# Patient Record
Sex: FEMALE | Race: OTHER | HISPANIC OR LATINO | Employment: FULL TIME | URBAN - METROPOLITAN AREA
[De-identification: names, ages, dates, MRNs, and addresses within clinical notes are randomized per-mention and may not be internally consistent; named-entity substitution may affect disease eponyms.]

---

## 2017-03-17 ENCOUNTER — ALLSCRIPTS OFFICE VISIT (OUTPATIENT)
Dept: OTHER | Facility: OTHER | Age: 27
End: 2017-03-17

## 2017-06-02 ENCOUNTER — ALLSCRIPTS OFFICE VISIT (OUTPATIENT)
Dept: OTHER | Facility: OTHER | Age: 27
End: 2017-06-02

## 2017-08-18 ENCOUNTER — ALLSCRIPTS OFFICE VISIT (OUTPATIENT)
Dept: OTHER | Facility: OTHER | Age: 27
End: 2017-08-18

## 2017-11-03 ENCOUNTER — ALLSCRIPTS OFFICE VISIT (OUTPATIENT)
Dept: OTHER | Facility: OTHER | Age: 27
End: 2017-11-03

## 2018-01-10 NOTE — PROGRESS NOTES
Assessment    1  On Depo-Provera for contraception (V25 49) (Z30 40)   2  Encounter for routine gynecological examination with Papanicolaou smear of cervix   (V72 31,V76 2) (Z01 419)    Plan  Encounter for routine gynecological examination with Papanicolaou smear of cervix    · Follow-up visit in 3 months Evaluation and Treatment  Follow-upGYN annual exam, PAP  due 2016  Status: Hold For - Scheduling  Requested for: [id number]   Ordered; For: Encounter for routine gynecological examination with Papanicolaou smear of cervix; Ordered By: Silas Kay Performed:  Due: [id number]  SocHx: On Depo-Provera for contraception    · MedroxyPROGESTERone Acetate 150 MG/ML Intramuscular Suspension  (Depo-Provera); INJECT INTRAMUSCULARLY AS DIRECTED   Rx By: Silas Kay; Dispense: 1 Days ; #:1 X 1 ML Vial; Refill: 5; For: SocHx: On Depo-Provera for contraception; EARL = N; Sent To: [place] [id number]    Discussion/Summary  health maintenance visit Currently, she eats a healthy diet, has an adequate exercise regimen and walks daily  the risks and benefits of cervical cancer screening were discussed cervical cancer screening is current next cervical cancer screening is due 2019 Breast cancer screening: the risks and benefits of breast cancer screening were discussed, self breast exam technique was taught and monthly self breast exam was advised  Advice and education were given regarding nutrition, aerobic exercise, weight bearing exercise, weight loss, calcium supplements, vitamin D supplements, contraception, sunscreen use and seat belt use  Patient discussion: discussed with the patient  Pt verbalized understanding of all discussed  Chief Complaint  Patient is here for annual exam and Depo        History of Present Illness  HPI: Last annual exam and WNL PAP 1 yr  ago prior to birth of baby, baby is 5 months old  Pt is using Depo for birth control, would like to continue   GYN HM, Adult Female [facility] Radames: The patient is being seen for a health maintenance evaluation  The last health maintenance visit was 1 year(s) ago  Social History: Household members include domestic partner and 1 daughter(s)  She is unmarried  Work status: not currently employed  General Health: The patient's health since the last visit is described as good  She has regular dental visits  She complains of vision problems  She denies hearing loss  Immunizations status: up to date  Lifestyle:  She consumes a diverse and healthy diet  She does not have any weight concerns  She exercises regularly  She exercises less than three times a week  Exercise includes walking  She does not use tobacco  She denies alcohol use  She denies drug use  Reproductive health:  she reports no menstrual problems  she uses contraception  For contraception, she uses depo-medroxyprogesterone  she is sexually active  She is monogamous with a male partner and 1 partner x3 yrs  pregnancy history: G 1P 1, 1   No menses with Depo  Screening: cancer screening reviewed and current  Review of Systems    Over the past 2 weeks, how often have you been bothered by the following problems? 1 ) Little interest or pleasure in doing things? Not at all    2 ) Feeling down, depressed or hopeless? Not at all    3 ) Trouble falling asleep or sleeping too much? Not at all    4 ) Feeling tired or having little energy? Not at all    5 ) Poor appetite or overeating? Not at all    6 ) Feeling bad about yourself, or that you are a failure, or have let yourself or your family down? Not at all    7 ) Trouble concentrating on things, such as reading a newspaper or watching television? Not at all    8 ) Moving or speaking so slowly that other people could have noticed, or the opposite, moving or speaking faster than usual? Not at all    9 ) Thoughts that you would be better off dead or of hurting yourself in some way? Not at all     Score 0      OB History  Pregnancy History (Brief):   Prior pregnancies: : 1  Para: 1 (full-term) and 1 (living)   Delivery type: 1  section   Baby was Breech      Active Problems    1  Birth control (V25 9) (Z30 9)   2  BMI 40 0-44 9, adult (V85 41) (Z68 41)   3  On Depo-Provera for contraception (V25 49) (Z30 40)   4  Postpartum follow-up (V24 2) (Z39 2)   5  Status post  section (V45 89) (Z98 891)    Past Medical History    · History of Echogenic focus of heart of fetus affecting antepartum care of mother (200 80)  (V50 3SN6)   · History of Encounter for pregnancy related examination in third trimester (V22 1) (Z34 83)   · History of pregnancy (V13 29)   · History of Maternal morbid obesity in third trimester, antepartum (649 13,278 01)  (O99 213,E66 01)   · History of Rubella non-immune status, antepartum (646 83,V15 83) (O99 89,Z28 3)    Surgical History    · History of  Section   · History of Foot Surgery    Family History  Mother    · Family history of depression (V17 0) (Z81 8)   · Family history of schizophrenia (V17 0) (Z81 8)  Father    · Family history of Diabetes mellitus due to underlying condition with complications   · Family history of depression (V17 0) (Z81 8)   · Family history of schizophrenia (V17 0) (Z81 8)    Social History    · Never smoker   · On Depo-Provera for contraception (V25 49) (Z30 40)    Current Meds   1  Depo-Provera 150 MG/ML Intramuscular Suspension; Therapy: (Recorded:12Stn4678) to Recorded   2  MedroxyPROGESTERone Acetate 150 MG/ML Intramuscular Suspension; INJECT   INTRAMUSCULARLY AS DIRECTED; Therapy: 69JNN9237 to (Evaluate:2016)  Requested for: 10USN5116;   Last Rx:2016 Ordered    Allergies    1  Aspirin EC Extra Strength TBEC    2  No Known Environmental Allergies   3   No Known Food Allergies    Vitals   Recorded: 57YJS1014 35:38VQ   Systolic 196   Diastolic 83   Height 5 ft 5 in   Weight 249 lb    BMI Calculated 41 44   BSA Calculated 2 17     Signatures Electronically signed by :  PILY Somers; Jun 2 2017 10:32AM EST                       (Author)

## 2018-01-10 NOTE — PROGRESS NOTES
Chief Complaint  Patient is here for Depo injection today  Active Problems    1  Birth control (V25 9) (Z30 9)   2  BMI 40 0-44 9, adult (V85 41) (Z68 41)   3  Encounter for routine gynecological examination with Papanicolaou smear of cervix   (V72 31,V76 2) (Z01 419)   4  On Depo-Provera for contraception (V25 49) (Z30 40)   5  Postpartum follow-up (V24 2) (Z39 2)   6  Status post  section (V45 89) (N32 367)    Current Meds   1  Depo-Provera 150 MG/ML Intramuscular Suspension; Therapy: (Recorded:20Lrq1667) to Recorded   2  MedroxyPROGESTERone Acetate 150 MG/ML Intramuscular Suspension; INJECT   INTRAMUSCULARLY AS DIRECTED; Therapy: 43WEI7239 to (Evaluate:2017)  Requested for: 2017;   Last Rx:2017 Ordered    Allergies    1  Aspirin EC Extra Strength TBEC    2  No Known Environmental Allergies   3  No Known Food Allergies    Plan  Birth control    · MedroxyPROGESTERone Acetate 150 MG/ML Intramuscular Suspension    Future Appointments    Date/Time Provider Specialty Site   2017 08:00 AM Kiley Sam 171, Injection Schedule  [address]     Signatures   Electronically signed by : PILY Reyes;  Aug 21 2017 10:17AM EST                       (Acknowledgement)    Electronically signed by : Ward Serna, ; Aug 22 2017  3:19PM EST                       (Author)    Electronically signed by : Grace Cox MD; Aug 22 2017  4:05PM EST                       (Administrative)

## 2018-01-10 NOTE — PROGRESS NOTES
2016         RE: Reji Spencer                              To: Lynsey Seymour   MR#: [id number]                                   [facility]   : 1990                                  Second Floor [employer]  ENC: [id number]:[facility]                             Harjit, [address]   (Exam #: [id number])                           Fax: [phone]      The LMP of this 30year old,  G1, P0-0-0-0 patient was OCT 27 2015, giving   her an POLO of AUG 2 2016 and a current gestational age of 42 weeks 6 days   by dates  A sonographic examination was performed on 2016 using   real time equipment  The ultrasound examination was performed using   abdominal technique  The patient has a BMI of 42 1  Her blood pressure   today was 135/89  Cardiac motion was observed at 130 bpm       INDICATIONS      morbid obesity      Exam Types      Amniotic Fluid Index      RESULTS      Fetus # 1 of 1   Breech presentation   Placenta Location = Posterior   Placenta Grade = II      AMNIOTIC FLUID      Q1: 6 1      Q2: 3 6      Q3: 3 8      Q4: 4 8   VALENTÍN Total = 18 2 cm   Amniotic Fluid: Normal      BIOPHYSICAL PROFILE      The Biophysical Profile score was 8/8  Breathin  Movement: 2  Tone: 2  AFV: 2      IMPRESSION      Chilel IUP   Breech presentation   Regular fetal heart rate of 130 bpm   Posterior placenta      GENERAL COMMENT      The nonstress test today showed a normal baseline, the presence of   accelerations, moderate variability, and no decelerations and thus the NST   was reassuring  The amniotic fluid was normal today as well and thus both   of these tests showed reassuring results  The fetus also showed fetal   breathing today as well and thus I was reassured by today's results  MIGUELANGEL Castaneda M D     Maternal-Fetal Medicine   Electronically signed 16 16:06

## 2018-01-11 NOTE — PROGRESS NOTES
2016         RE: Dee Mann                               To: Milagro Ragsdale   MR#: [id number]                                   [address] [place] [address]   : 1990                                  Second Floor [employer]  ENC: [id number]                             [place], [address]   (Exam #: [id number])                           Fax: [phone]      The LMP of this 30year old,  G1, P0-0-0-0 patient was OCT 27 2015, giving   her an POLO of AUG 2 2016 and a current gestational age of 29 weeks 1 day   by dates  A sonographic examination was performed on 2016 using   real time equipment  The ultrasound examination was performed using   abdominal technique  The patient has a BMI of 41 3  Her blood pressure   today was 120/79  Cardiac motion was observed at 136 bpm       INDICATIONS      morbid obesity   Evaluate missed anatomy   echogenic focus, left ventricle   abnormal uterine Doppler   elevated  1 hr  GTT      Exam Types      Level I      RESULTS      Fetus # 1 of 1   Breech presentation   Fetal growth appeared normal   Placenta Location = Fundal   No placenta previa   Placenta Grade = II      MEASUREMENTS (* Included In Average GA)      AC              28 5 cm        32 weeks 4 days* (26%)   BPD              8 4 cm        33 weeks 6 days* (42%)   HC              30 0 cm        32 weeks 6 days* (16%)   Femur            6 6 cm        33 weeks 5 days* (49%)      HC/AC           1 05   FL/AC           0 23   FL/BPD          0 79   EFW (Ac/Fl/Hc)  2113 grams - 4 lbs 11 oz                 (30%)      THE AVERAGE GESTATIONAL AGE is 33 weeks 1 day +/- 18 days        AMNIOTIC FLUID      Q1: 5 8      Q2: 3 3      Q3: 5 8      Q4: 3 6   VALENTÍN Total = 18 5 cm   Amniotic Fluid: Normal      ANATOMY DETAILS      Visualized Appearing Sonographically Normal:   HEAD: (Calvarium, BPD Level, Lateral Ventricles, Cerebellum);    STOMACH,   RIGHT KIDNEY, LEFT KIDNEY, BLADDER, PLACENTA      Suboptimally Visualized:   HEART: ([employer], Aortic Arch);    EXTREMS: (Rt Hand, Rt Foot)      Not Visualized:   HEART: (Proximal Left Outflow, Proximal Right Outflow, Ductal Arch)      IMPRESSION      Chilel IUP   33 weeks and 1 day by this ultrasound  (POLO=AUG 9 2016)   Breech presentation   Fetal growth appeared normal   Regular fetal heart rate of 136 bpm   Fundal placenta   No placenta previa      GENERAL COMMENT      On exam today the patient appears well, in no acute distress, and denies   any complaints  Her abdomen is non-tender  There has been appropriate interval fetal growth  Good fetal movement and   tone are seen  The amniotic fluid volume appears normal   The placenta is   fundal and it appears sonographically normal   The anatomic structures   listed above could not be optimally imaged today because of fetal   position; however, these structures were seen on a prior ultrasound and   appeared sonographically normal except for the cardiac arches which were   still not well visualized today  The patient was informed of today's   findings and all of her questions were answered  The limitations of   ultrasound were reviewed with the patient   labor precautions and   fetal kick counts were reviewed  Discussed follow-up in detail I recommend patient return to initiate   weekly antepartum surveillance at around 36 weeks  We discussed the   increased risk factors of morbid obesity and the reasoning for this   additional surveillance  Thank you very much for allowing us to participate in the care of this   very nice patient  Should you have any questions, please do not hesitate   to contact our office        Please note, in addition to the time spent discussing the results of the   ultrasound, I spent approximately 10 minutes of face-to-face time with the   patient, greater than 50% of which was spent in counseling and the   coordination of care for this patient  MIGUELANGEL Zhu M D     Electronically signed 06/22/16 13:18

## 2018-01-11 NOTE — PROGRESS NOTES
EXTREMS:   (Rt Hand)      Not Visualized:   FACE/NECK: (Nose/Lips, Palate); HEART: (Four Chamber View, Proximal   Left Outflow, Short Axis of Greater Vessels, Interventricular Septum,   Interatrial Septum);    EXTREMS: (Lt Foot, Rt Foot)      IMPRESSION      Chilel IUP      GENERAL COMMENT      Today's ultrasound overall reassuring  This was primarily a followup   anatomy ultrasound as the previous ultrasounds severely limited secondary   to the Cushing's related to maternal morbid obesity  His ultrasound is   also very difficult given the patient's significant morbid obesity and   density with poor acoustic windows  We were able to visualize most of the   fetal face, spine, and distal extremities  Suboptimal visualization of   the heart remained today  The arches were suboptimally visualized and   there was a normal 3 vessel view  Situs was normal   The kidneys are   present but anterior posterior interrogation could not be seen  Two feet   are present however right angles of the ankles could not be appreciated  The patient will return in approximately 6 weeks for a growth ultrasound   and to complete the anatomy  Thank you very much for allowing us to participate in the care of this   very nice patient  Should you have any questions, please do not hesitate   to contact our office  MIGUELANGEL Mast M D     Electronically signed 03/30/16 10:06

## 2018-01-12 NOTE — PROGRESS NOTES
MAY 11 2016         RE: Chilo Choi                               To: Lynsey Seymour   MR#: [id number]                                   [address] [place] [address]   : 1990                                  [address] [employer]  ENC: [id number]                             [place], [address]   (Exam #: [id number])                           Fax: [phone]      The LMP of this 30year old,  G1, P*-*-*-* patient was OCT 27 2015, giving   her an POLO of AUG 2 2016 and a current gestational age of 35 weeks 1 day   by dates  A sonographic examination was performed on MAY 11 2016 using   real time equipment  The ultrasound examination was performed using   abdominal technique  The patient has a BMI of 41 3  Her blood pressure   today was 120/80  Cardiac motion was observed at 138 bpm       INDICATIONS      morbid obesity   Evaluate missed anatomy   echogenic focus, left ventricle   abnormal uterine Doppler      Exam Types      Level I      RESULTS      Fetus # 1 of 1   Breech presentation   Fetal growth appeared normal   Placenta Location = Posterior   No placenta previa   Placenta Grade = I      MEASUREMENTS (* Included In Average GA)      AC              24 1 cm        28 weeks 3 days* (52%)   BPD              6 8 cm        27 weeks 4 days* (30%)   HC              25 4 cm        27 weeks 3 days* (24%)   Femur            5 2 cm        28 weeks 0 days* (37%)      Cerebellum       3 3 cm        29 weeks 5 days   CisternaMagna    8 7 mm      HC/AC           1 05   FL/AC           0 22   FL/BPD          0 77   EFW (Ac/Fl/Hc)  1175 grams - 2 lbs 9 oz                 (49%)      THE AVERAGE GESTATIONAL AGE is 27 weeks 6 days +/- 14 days        AMNIOTIC FLUID      Q1: 4 2      Q2: 5 3      Q3: 5 3      Q4: 5 9   VALENTÍN Total = 20 6 cm   Amniotic Fluid: Normal      ANATOMY      Head                                    See Details   Face/Neck See Details   Heart                                   See Details   Abd  Cav  Normal   Stomach                                 Normal   Right Kidney                            Normal   Left Kidney                             Normal   Bladder                                 Normal   Placenta                                Normal      ANATOMY DETAILS      Visualized Appearing Sonographically Normal:   HEAD: (Calvarium, BPD Level, Cerebellum, Cis[employer] Magna);    FACE/NECK:   (Nose/Lips, Palate); HEART: ([employer], Cardiac Axis, Cardiac   Position);    ABD  CAV , STOMACH, RIGHT KIDNEY, LEFT KIDNEY, BLADDER,   PLACENTA      Suboptimally Visualized:   HEAD: (Lateral Ventricles); HEART: (Proximal Left Outflow, Proximal   Right Outflow)      Not Visualized:   HEART: (Aortic Arch, Ductal Arch, Interventricular Septum, Interatrial   Septum)      ANATOMY COMMENTS      Fetal anatomy has been previously documented; no anomalies were   identified  The prior US was limited in the area of the kidneys, palate,   nose lips four chamber view which were seen today and appear normal  The   prior US was limited in the area of the right hand, angles of the   legs/feet, cardiac arches, cardiac septum, short axis and LVOT which are   still limited on todays scan  No fetal structural abnormality is   identified on the Level I survey today  Follow up anatomy of the lateral   ventricles, 4 chamber view, outflow tracts, diaphragm,  kidneys, stomach   and bladder appear normal   Fetal interval growth and amniotic fluid   volume are normal       IMPRESSION      Chilel IUP   27 weeks and 6 days by this ultrasound  (POLO=AUG 4 2016)   Breech presentation   Fetal growth appeared normal   Regular fetal heart rate of 138 bpm   Posterior placenta   No placenta previa      GENERAL COMMENT         I had the pleasure of seeing Misa Roca  in the [facility]    today for followup growth scan   She reports normal daily fetal movements  She denies any vaginal bleeding, leakage of fluid, or significant   contractions or pelvic pressure  There has been no major pregnancy   complications since her last visit here in the  Center  She is   obese with a BMI of 41  There was also an echogenic focus within the left   ventricle of the fetal heart  She also had abnormal uterine artery Doppler   flow studies  Her blood pressure today was 120/80 mmHg  On today's ultrasound, the fetus was in a breech presentation  The   amniotic fluid appeared very normal today  Fetal growth was within normal   range  The interval anatomy seen today showed no obvious anomalies  We discussed the importance of initiating fetal kick counting at least   once daily  We discussed the "10 kicks in 2 hour rule"  I instructed her   to report to you immediately should criteria not be met  Kick counts   should begin at 28 weeks gestation  IMPORTANT  FINDINGS ON TODAY'S ULTRASOUND: Breech with appropriate growth         IN SUMMARY:  Today's ultrasound was reassuring as the fetus is growing   well with normal amniotic fluid and a normal umbilical artery Doppler flow   study  The fetus was in a breech presentation  She should continue to do   her kick counts on a daily basis  A fetal growth evaluation is recommended   in 6 weeks and this was scheduled today  Face-to-face time, in addition to time spent discussing ultrasound results   was 10 minutes, with greater than 50% of the time used for counseling and   coordination of care  A description of the counseling/coordination of care   is described above  Thank you very much for allowing us to participate in the care of your   patient  If you have any questions or concerns about today's visit please   do not hesitate to call me  Thank you very much  [facility]  Maternal Fetal Medicine      MIGUELANGEL Oreilly M D  Maternal-Fetal Medicine   Electronically signed 05/11/16 10:08

## 2018-01-12 NOTE — PROGRESS NOTES
MAR 15 2016         RE: Aniyah Jade                              To: [facility]   MR#: [id number]                                   [address] [place] [address]   : 1990                                  Second Floor [employer]  ENC: [id number]                             alex, [address]   (Exam #: [id number])                           Fax: [phone]      The LMP of this 30year old,  1, para 0 patient was OCT 27 2015,   giving her an POLO of AUG 2 2016 and a current gestational age of 25 weeks   0 days by dates  A sonographic examination was performed on MAR 15 2016   using real time equipment  The ultrasound examination was performed using   abdominal & vaginal techniques  The patient has a BMI of 40 8  Her blood   pressure today was 133/84  Thank you very much for your kind referral of Kwadwo Paris to the   [facility], [facility]  in [facility] on March 15, 2016 for level II ultrasound   evaluation and  assessment  Michael Victoria is a 57-year-old primigravida   who is currently at 20-0/7 weeks gestation  She has no complaints  She   denies vaginal bleeding  She has not yet been screened for gestational   diabetes during this pregnancy  She has not had genetic screening obtained  Michael Victoria is morbidly obese  Her past medical and surgical histories are   otherwise apparently unremarkable  She currently takes no medication with   the exception of a prenatal vitamin on a daily basis  She has a drug   allergy to aspirin        Cardiac motion was observed at 153 bpm       INDICATIONS      fetal anatomical survey   morbid obesity      Exam Types      LEVEL II   Transvaginal      RESULTS      Fetus # 1 of 1   Vertex presentation   Fetal growth appeared normal   Placenta Location = Posterior, right lateral   No placenta previa   Placenta Grade = I      MEASUREMENTS (* Included In Average GA)      AC              15 2 cm        20 weeks 1 day * (54%)   BPD              4 5 cm        19 weeks 4 days* (39%)   HC              17 6 cm        20 weeks 0 days* (49%)   Femur            3 5 cm        21 weeks 1 day * (62%)      Nuchal Fold      3 1 mm      Humerus          3 4 cm        21 weeks 4 days  (86%)   Radius           2 8 cm        21 weeks 4 days   Ulna             2 8 cm        20 weeks 4 days   Tibia            3 2 cm        21 weeks 6 days  (95%)   Fibula           3 1 cm        20 weeks 5 days   Foot             3 4 cm        20 weeks 2 days      Cerebellum       2 0 cm        19 weeks 6 days   Biorbit          3 4 cm        21 weeks 6 days   CisternaMagna    2 8 mm      HC/AC           1 16   FL/AC           0 23   FL/BPD          0 78   EFW (Ac/Fl/Hc)   360 grams - 0 lbs 13 oz      THE AVERAGE GESTATIONAL AGE is 20 weeks 1 day +/- 10 days  AMNIOTIC FLUID      Largest Vertical Pocket = 5 2 cm   Amniotic Fluid: Normal      ANATOMY SUMMARY      The fetal cranium appeared normal in shape  Choroid plexus cysts are not   present  The lateral ventricles were not dilated and the midline   structures were not deviated  The cerebellum and cisterna magna were   visualized and appeared normal  The nuchal fold is not abnormally   thickened, measured at   3 1 mm  The calvarium showed no evidence of   defect, scalloping of the parietal bones or abnormal shape  The cavum   septum pellucidum appears normal  The fetal face was not imaged well  Anatomy of the fetal thorax appeared within normal limits  in limited   views  The fetal diaphragm appears intact  There were no intrathoracic   masses noted or evidence of pleural/pericardial effusion  There was no   suspicion of tricuspid regurgitation  The cardiac size and structures   appeared sonographically normal at the four chamber view, and cardiac   rhythm was regular  There is no suspicion of fetal dysrhythmia    There was   no evidence of cardiomegaly, pericardial effusion or atrial/ventricular disproportion  An echogenic intracardiac focus  may be present in the   left ventricle  The outflow tract views are normal   The septal, arch,   and 3 vessel tracheal views are suboptimally imaged  The short axis view   of the great vessels is suboptimally imaged  The abdominal cavity appears   normal  There is no evidence of fetal bowel obstruction or abnormally   echogenic bowel  Ascites is not present  The fetal stomach appears normal   in size and shape  The kidneys are suboptimally imaged  The fetal   bladder appears normal in size and shape  There is no suspicion of   ureterocele  The abdominal wall appears intact  A normal abdominal cord   insertion is noted  The fetal spine was not imaged well  Active movement   of the extremities was seen and fetal body motion was also observed during   this examination  There was no evidence of long bone abnormality  The   distal extremities are suboptimally imaged The genitalia appeared normal    The placenta appears normal  There is no evidence of advanced placental   maturation, placental abruption, intervillous thrombosis, placental   infarction or multiple venous lakes  There is a 3 vessel cord  The   placental cord insertion site appears normal  The uterine contour appears   normal  There is no suspicion of a uterine myoma  ADNEXA      The left ovary appeared normal and measured 3 1 x 2 7 x 2 0 cm with a   volume of 8 8 cc  The right ovary was not visualized  UTERINE ARTERIES                                  S/D   PI    RI    NOTCH       Left Uterine Artery              1 02         No       Right Uterine Artery             1 04         No      CERVICAL EVALUATION      The cervix appeared normal            Supine               Cervical Length: 3 60 cm        Other Test Results         Resp To T F  Pressure:  No                    Funneling?: No              Dynamic Changes?: No      IMPRESSION      Chilel IUP   20 weeks and 1 day by this ultrasound  (POLO=AUG 1 2016)   Vertex presentation   Fetal growth appeared normal   Regular fetal heart rate of 153 bpm   Posterior, right lateral placenta   No placenta previa      GENERAL COMMENT      An  echogenic intracardiac focus may be present in the left ventricle of   the fetal heart  No fetal structural abnormality or ultrasound marker for   aneuploidy is otherwise identified on the Level II ultrasound study today   In a very limited and quite difficult study secondary to the constraints   related to maternal morbid obesity  In particular, suboptimal imaging of   the face, heart, kidneys,  and distal extremities is afforded  Fetal   growth, amniotic fluid volume, and maternal uterine artery Doppler study   are normal   The placenta is normal in appearance  The cervix is normal in appearance by transvaginal sonography  The   cervical length is normal   Cervical debris is not present  Cervical   funneling is not present  Neither provocative nor dynamic change is   appreciated  There is no evidence of vasa previa using color Doppler   evaluation  Chen Frost will return to the [address] [place] [address] in 2 weeks to assess fetal   anatomic targets not imaged well today  The apparently isolated echogenic   intracardiac focus is most likely a normal anatomic variant  However, an   EIF is an ultrasound marker for Down syndrome  A Quad Screen should be   obtained for further non invasive genetic risk assessment  Further  M   comments with respect to the risk for underlying aneuploidy will be made   following assessment of remaining anatomic targets in 2 weeks  Morbid obesity is associated with an increased risk for adverse pregnancy   outcomes, including gestational diabetes, fetal growth abnormalities   including macrosomia, fetal structural abnormalities, preeclampsia, venous   thromboembolism, stillbirth, and increased likelihood for    section   Serial fetal growth evaluations are recommended during the second   half of the pregnancy, along with consideration of third trimester   antepartum fetal heart rate testing  Screening for gestational diabetes   should be considered soon  The face to face time, in addition to time spent discussing ultrasound   results, was 15 minutes, greater than 50% of which was spent during   counseling and coordination of care  MIGUELANGEL Jamison M D     Maternal-Fetal Medicine   Electronically signed 03/15/16 18:26

## 2018-01-13 NOTE — PROGRESS NOTES
2016         RE: Oracio Britt                               To: [facility]   MR#: [id number]                                   [address] [place] [address]   : 1990                                  Second Floor [employer]  ENC: [id number] Lombardi, Mk [address]   (Exam #: [id number])                           Fax: [phone]      The LMP of this 30year old,  G1, P0-0-0-0 patient was OCT 27 2015, giving   her an POLO of AUG 2 2016 and a current gestational age of 42 weeks 1 day   by dates  A sonographic examination was performed on 2016 using real   time equipment  The ultrasound examination was performed using abdominal   technique  The patient has a BMI of 42 3  Her blood pressure today was   120/81  Cardiac motion was observed at 138 bpm       INDICATIONS      morbid obesity   echogenic focus, left ventricle      Exam Types      Amniotic Fluid Index      RESULTS      Fetus # 1 of 1   Breech presentation   Placenta Location = Posterior, fundal   No placenta previa   Placenta Grade = III      The NST was reactive with no decelerations  AMNIOTIC FLUID      Q1: 3 0      Q2: 4 0      Q3: 3 7      Q4: 3 3   VALENTÍN Total = 14 0 cm   Amniotic Fluid: Normal      IMPRESSION      Chilel IUP   Breech presentation   Regular fetal heart rate of 138 bpm   Posterior, fundal placenta   No placenta previa      GENERAL COMMENT         Please note the fetus is breech today  Follow-up fetal surveillance   includes once weekly NST's and VALENTÍN's, along with daily kick counts  MIGUELANGEL King M D     Maternal-Fetal Medicine   Electronically signed 16 18:32

## 2018-01-14 VITALS
DIASTOLIC BLOOD PRESSURE: 83 MMHG | HEIGHT: 65 IN | WEIGHT: 249 LBS | SYSTOLIC BLOOD PRESSURE: 129 MMHG | BODY MASS INDEX: 41.48 KG/M2

## 2018-01-15 NOTE — PROGRESS NOTES
Chief Complaint  Depo today  Active Problems    1  BMI 40 0-44 9, adult (V85 41) (Z68 41)   2  On Depo-Provera for contraception (V25 49) (Z30 40)   3  Postpartum follow-up (V24 2) (Z39 2)   4  Status post  section (V45 89) (D11 112)    Current Meds   1  Depo-Provera 150 MG/ML Intramuscular Suspension; Therapy: (Recorded:11Pbp1178) to Recorded   2  MedroxyPROGESTERone Acetate 150 MG/ML Intramuscular Suspension; INJECT   INTRAMUSCULARLY AS DIRECTED; Therapy: 27QEH0774 to (Evaluate:2016)  Requested for: 84YNZ9309;   Last Rx:2016 Ordered   3  Prenatal Vitamins 0 8 MG Oral Tablet; TAKE 1 TABLET DAILY; Therapy: 97GOV5915 to (Evaluate:2017)  Requested for: 42KWO8709; Last   Rx:2016 Ordered    Allergies    1  Aspirin EC Extra Strength TBEC    2  No Known Environmental Allergies   3  No Known Food Allergies    Results/Data  Urine HCG- POC 2016 09:30AM Any Donald     Test Name Result Flag Reference   Urine HCG Negative         Assessment    1  Birth control (V25 9) (Z30 9)    Plan  Birth control    · MedroxyPROGESTERone Acetate 150 MG/ML Intramuscular Suspension  (Depo-Provera)    Future Appointments    Date/Time Provider Specialty Site   2016 09:15 AM Kiley Sam 171, Injection Schedule  [facility] [address]  [facility]     Signatures   Electronically signed by :  PILY Angeles; Oct 18 2016 10:44AM EST                       (Acknowledgement)    Electronically signed by : Jaquan Vilchis MD; Oct 20 2016  8:34AM EST

## 2018-01-16 NOTE — PROGRESS NOTES
Chief Complaint  Patient is here for Depo  Active Problems    1  Birth control (V25 9) (Z30 9)   2  BMI 40 0-44 9, adult (V85 41) (Z68 41)   3  On Depo-Provera for contraception (V25 49) (Z30 40)   4  Postpartum follow-up (V24 2) (Z39 2)   5  Status post  section (V45 89) (S97 073)    Current Meds   1  Depo-Provera 150 MG/ML Intramuscular Suspension; Therapy: (Recorded:08Vdo1590) to Recorded   2  MedroxyPROGESTERone Acetate 150 MG/ML Intramuscular Suspension; INJECT   INTRAMUSCULARLY AS DIRECTED; Therapy: 04YHY8032 to (Evaluate:2016)  Requested for: 49ZRU0286;   Last Rx:2016 Ordered    Allergies    1  Aspirin EC Extra Strength TBEC    2  No Known Environmental Allergies   3   No Known Food Allergies    Vitals  Signs    Systolic: 110  Diastolic: 84  Height: 5 ft 5 in  Weight: 256 lb   BMI Calculated: 42 6  BSA Calculated: 2 2    Plan  Birth control    · MedroxyPROGESTERone Acetate 150 MG/ML Intramuscular Suspension    Future Appointments    Date/Time Provider Specialty Site   2017 09:30 AM [facility] PILY Maldonado Schedule  [facility]     Signatures   Electronically signed by : Isabelle Espinoza DO; Mar 17 8300  9:50AM EST                       (Author)

## 2018-01-17 NOTE — PROGRESS NOTES
2016         RE: Eileen Dubois                               To: [facility]   MR#: [id number]                                   [address] [place] [address]   : 1990                                  Second Floor [employer]  ENC: [id number]                             Mk Lombardi [address]   (Exam #: [id number])                           Fax: [phone]      The LMP of this 30year old,  G1, P0-0-0-0 patient was OCT 27 2015, giving   her an POLO of AUG 2 2016 and a current gestational age of 42 weeks 0 days   by dates  A sonographic examination was performed on 2016 using   real time equipment  The ultrasound examination was performed using   abdominal technique  The patient has a BMI of 42 1  Her blood pressure   today was 132/86  Cardiac motion was observed at 136 bpm       INDICATIONS      morbid obesity      Exam Types      Amniotic Fluid Index   NST      RESULTS      Fetus # 1 of 1   Breech presentation   Placenta Location = Right lateral, fundal   No placenta previa   Placenta Grade = II      The NST was reactive with no decelerations  AMNIOTIC FLUID      Q1: 4 7      Q2: 0 8      Q3: 4 0      Q4: 4 6   VALENTÍN Total = 14 1 cm   Amniotic Fluid: Normal      IMPRESSION      Chilel IUP   Breech presentation   Regular fetal heart rate of 136 bpm   Right lateral, fundal placenta   No placenta previa      RECOMMENDATION      VALENTÍN: 1 Week   NST: 1 Week      MIGUELANGEL Gibson M D     Maternal-Fetal Medicine   Electronically signed 16 10:39

## 2018-01-18 NOTE — PROGRESS NOTES
Chief Complaint  Patient is here for Depo injection today  Active Problems    1  Birth control (V25 9) (Z30 9)   2  BMI 40 0-44 9, adult (V85 41) (Z68 41)   3  Encounter for routine gynecological examination with Papanicolaou smear of cervix   (V72 31,V76 2) (Z01 419)   4  On Depo-Provera for contraception (V25 49) (Z30 40)   5  Postpartum follow-up (V24 2) (Z39 2)   6  Status post  section (V45 89) (P92 492)    Current Meds   1  Depo-Provera 150 MG/ML Intramuscular Suspension; Therapy: (Recorded:80Smt5116) to Recorded   2  MedroxyPROGESTERone Acetate 150 MG/ML Intramuscular Suspension; INJECT   INTRAMUSCULARLY AS DIRECTED; Therapy: 98BQO4479 to (Evaluate:2017)  Requested for: 2017;   Last Rx:2017 Ordered    Allergies    1  Aspirin EC Extra Strength TBEC    2  No Known Environmental Allergies   3  No Known Food Allergies    Vitals  Signs    Systolic: 292  Diastolic: 79  Weight: 298 lb   BMI Calculated: 39 94  BSA Calculated: 2 14    Plan  Birth control    · MedroxyPROGESTERone Acetate 150 MG/ML Intramuscular Suspension    Future Appointments    Date/Time Provider Specialty Site   2018 08:15 AM [facility], Injection Schedule  [facility]     Signatures   Electronically signed by : Aviva Anderson, ; Nov  3 2017  8:27AM EST                       (Author)    Electronically signed by :  PILY Orourke; Nov  3 2017  8:41AM EST                       (Acknowledgement)    Electronically signed by : Sammy Panda MD; 2017  1:30PM EST                       (Administrative)

## 2018-01-22 VITALS — DIASTOLIC BLOOD PRESSURE: 79 MMHG | BODY MASS INDEX: 39.94 KG/M2 | SYSTOLIC BLOOD PRESSURE: 127 MMHG | WEIGHT: 240 LBS

## 2018-01-22 VITALS
DIASTOLIC BLOOD PRESSURE: 84 MMHG | WEIGHT: 256 LBS | HEIGHT: 65 IN | SYSTOLIC BLOOD PRESSURE: 132 MMHG | BODY MASS INDEX: 42.65 KG/M2

## 2018-01-26 ENCOUNTER — OFFICE VISIT (OUTPATIENT)
Dept: OBGYN CLINIC | Facility: CLINIC | Age: 28
End: 2018-01-26
Payer: COMMERCIAL

## 2018-01-26 VITALS
WEIGHT: 242 LBS | SYSTOLIC BLOOD PRESSURE: 143 MMHG | DIASTOLIC BLOOD PRESSURE: 83 MMHG | HEIGHT: 63 IN | BODY MASS INDEX: 42.88 KG/M2

## 2018-01-26 DIAGNOSIS — Z30.09 ENCOUNTER FOR COUNSELING REGARDING CONTRACEPTION: Primary | ICD-10-CM

## 2018-01-26 PROCEDURE — 99213 OFFICE O/P EST LOW 20 MIN: CPT | Performed by: NURSE PRACTITIONER

## 2018-01-26 NOTE — PROGRESS NOTES
Assessment/Plan:      Diagnoses and all orders for this visit:    Encounter for counseling regarding contraception          Subjective:     Patient ID: Huan Peacock is a 32 y o  female  Pt presents to switch from Depo to Mirena  Pt has a 1 yr  Old child and does not want more children x 5 yrs  Discussed long term method and bleeding pattern  Mirena brochure was provided  Pt is covered with Depo until 2 2 2018  Last annual exam and PAP was 2016  Pt plans to schedule Mirena insertion and then annual GYN exam        Review of Systems   Constitutional: Negative            Objective:     Physical Exam

## 2018-01-26 NOTE — PATIENT INSTRUCTIONS
PLease read [facility] brochure  Call with further questions  Return before2/2/2018 for Mirena insertion  Schedule annual exam last PAP was 1/2016

## 2018-02-01 ENCOUNTER — OFFICE VISIT (OUTPATIENT)
Dept: OBGYN CLINIC | Facility: CLINIC | Age: 28
End: 2018-02-01
Payer: COMMERCIAL

## 2018-02-01 VITALS
HEIGHT: 63 IN | HEART RATE: 102 BPM | SYSTOLIC BLOOD PRESSURE: 137 MMHG | BODY MASS INDEX: 42.7 KG/M2 | WEIGHT: 241 LBS | DIASTOLIC BLOOD PRESSURE: 84 MMHG

## 2018-02-01 DIAGNOSIS — Z30.018 ENCOUNTER FOR INITIAL PRESCRIPTION OF OTHER CONTRACEPTIVES: Primary | ICD-10-CM

## 2018-02-01 LAB — SL AMB POCT URINE HCG: NORMAL

## 2018-02-01 PROCEDURE — 99213 OFFICE O/P EST LOW 20 MIN: CPT | Performed by: OBSTETRICS & GYNECOLOGY

## 2018-02-01 PROCEDURE — 58300 INSERT INTRAUTERINE DEVICE: CPT | Performed by: OBSTETRICS & GYNECOLOGY

## 2018-02-01 PROCEDURE — 81025 URINE PREGNANCY TEST: CPT | Performed by: OBSTETRICS & GYNECOLOGY

## 2018-02-01 NOTE — PROGRESS NOTES
Iud insertions  Date/Time: 2018 4:37 PM  Performed by: Lisa Ace  Authorized by: Lisa Ace     Consent:     Consent obtained:  Verbal and written    Consent given by:  Patient    Procedure risks and benefits discussed: yes      Patient questions answered: yes      Patient agrees, verbalizes understanding, and wants to proceed: yes      Educational handouts given: yes      Instructions and paperwork completed: yes    Universal protocol:     Patient states understanding of procedure being performed: yes    Procedure:     Pelvic exam performed: yes      Negative GC/chlamydia test: yes      Negative urine pregnancy test: yes      Cervix cleaned and prepped: yes      Speculum placed in vagina: yes      Tenaculum applied to cervix: yes      IUD inserted with no complications: yes      IUD type:  Mirena    Strings trimmed: yes (3cm)      Uterus sounded: Sounded prior to insertion  Uterus sound depth (cm):  9  Post-procedure:     Patient tolerated procedure well: yes      Patient will follow up after next period: yes (1 month)    Comments:      Pt is a 30yo  presenting for IUD insertion  Pt was on Depo provera previously  Pt was counseled on the risks, benefits and alternatives previously and again today  IUD insertion agreement reviewed and signed today as well as consent for insertion   Mirena IUD lot number: Marilyn West was present for the entire procedure    Ralf Hills MD  OBGYN, PGY-1  2018 4:41 PM

## 2018-03-01 ENCOUNTER — OFFICE VISIT (OUTPATIENT)
Dept: OBGYN CLINIC | Facility: CLINIC | Age: 28
End: 2018-03-01
Payer: COMMERCIAL

## 2018-03-01 VITALS — SYSTOLIC BLOOD PRESSURE: 129 MMHG | DIASTOLIC BLOOD PRESSURE: 83 MMHG | WEIGHT: 240 LBS | BODY MASS INDEX: 42.51 KG/M2

## 2018-03-01 DIAGNOSIS — Z30.431 IUD CHECK UP: Primary | ICD-10-CM

## 2018-03-01 PROCEDURE — 99213 OFFICE O/P EST LOW 20 MIN: CPT | Performed by: OBSTETRICS & GYNECOLOGY

## 2018-03-01 NOTE — PROGRESS NOTES
Assessment/Plan:    #1 IUD Checkup  Mirena IUD inserted 18   Strings visualized on SSE today, no complaints  Follow up for annual GYN examination    Discussed with Dr Isaiah Vigil    Subjective:      Patient ID: Lilliam Parker is a 32 y o  female  HPI    31 y/o  previously on Depo Provera with Mirena IUD inserted on 18 presents for IUD string check  She is asymptomatic without complaints  Denies pain or abnormal bleeding  Review of Systems   Constitutional: Negative for chills and fever  Genitourinary: Negative for pelvic pain, vaginal bleeding, vaginal discharge and vaginal pain  Objective:      /83   Wt 109 kg (240 lb)   BMI 42 51 kg/m²          Physical Exam   Constitutional: She is oriented to person, place, and time  She appears well-developed and well-nourished  Pulmonary/Chest: Effort normal    Genitourinary: Vagina normal and uterus normal    Genitourinary Comments: SSE: Cervix visualized and normal appearing  IUD strings visualized  Neurological: She is alert and oriented to person, place, and time  Skin: Skin is warm and dry  Psychiatric: She has a normal mood and affect

## 2018-03-01 NOTE — PATIENT INSTRUCTIONS
Levonorgestrel (Into the uterus)   Levonorgestrel (anr-uhj-cbw-APRIL-trel)  Prevents pregnancy and treats heavy menstrual bleeding  This is an intrauterine device (IUD), which is a reversible form of birth control  This IUD slowly releases levonorgestrel, a hormone  Brand Name(s): Alveria Guadalupe, Mirena, [place]   There may be other brand names for this medicine  When This Medicine Should Not Be Used: This device is not right for everyone  Do not use it if you had an allergic reaction to levonorgestrel, or you are pregnant  How to Use This Medicine:   Device  · The IUD is usually inserted by your doctor during your monthly period  You will need to see your doctor 4 to 6 weeks after the IUD is placed and then once a year  · Your IUD has a string or "tail" that is made of plastic thread  About one or two inches of this string hangs into your vagina  You cannot see this string, and it will not cause problems when you have sex  Check your IUD after each monthly period  You may not be protected against pregnancy if you cannot feel the string or if you feel plastic  Do the following to check the placement of your IUD:  [facility] your hands with soap and warm water  Dry them with a clean towel  ¨ Bend your knees and squat low to the ground  ¨ Gently put your index finger high inside your vagina  The cervix is at the top of the vagina  Find the IUD string coming from your cervix  Never pull on the string  You should not be able to feel the plastic of the IUD itself  Wash your hands after you are done checking your IUD string  · Your doctor will need to replace your IUD after 3 years for [facility] or [place], or after 5 years for [facility] or [place] [address]  You will also need to have it replaced if it comes out of your uterus  Drugs and Foods to Avoid:   Ask your doctor or pharmacist before using any other medicine, including over-the-counter medicines, vitamins, and herbal products    · Some medicines can affect how this device works  Tell your doctor if you are using a blood thinner (including warfarin)  Warnings While Using This Medicine:   · Tell your doctor if you are breastfeeding, or you have had a baby, miscarriage, or  in the past 3 months  Tell your doctor if you have liver disease (including tumor or cancer), breast cancer, heart or blood circulation problems, including a history of heart valve problems, heart disease, blood clotting problems, stroke, heart attack, or high blood pressure  Tell your doctor if you have problems with your immune system or have had surgery on your female organs (especially fallopian tubes)  · Tell your doctor if you have had any problems, infections, or other conditions that affected your reproductive system  There are many problems that could make an IUD a bad choice for you, including if you have fibroids, unexplained bleeding, a uterus that has an unusual shape, a recent infection, pelvic inflammatory disease, abnormal Pap test, ectopic pregnancy, cancer or suspected cancer, or an existing IUD  · There is a small chance that you could get pregnant when using an IUD, just as there is with any birth control  If you get pregnant, your doctor may remove your IUD to lower the risk of miscarriage or other problems  · This medicine may cause the following problems:  ¨ Increased risk of ectopic pregnancy (pregnancy outside the uterus)  ¨ Increased risk of a serious infection called pelvic inflammatory disease (PID)  ¨ Increased risk for ovarian cysts  ¨ Perforation (hole in the wall of your uterus), which can damage other organs  · You might have some spotting and cramping during the first weeks after the IUD has been inserted  These symptoms should decrease or go away within a few weeks up to 6 months  · You could have less bleeding or even stop having periods by the end of the first year   Call your doctor if you have a change from the regular bleeding pattern after you have had your IUD for awhile, such as more bleeding or if you miss a period (and you were having periods even with your IUD)  · An IUD can slip partly or all of the way out of your uterus  If this happens, use condoms or another form of birth control, and call your doctor right away  · This IUD will not protect you from HIV/AIDS, herpes, or other sexually transmitted diseases  · If you have the Ashlyn Collins or Susan Chavarria, tell your healthcare provider before you have an MRI test   Possible Side Effects While Using This Medicine:   Call your doctor right away if you notice any of these side effects:  · Allergic reaction: Itching or hives, swelling in your face or hands, swelling or tingling in your mouth or throat, chest tightness, trouble breathing  · Chest pain, problems with speech or walking, numbness or weakness in your arm or leg or on one side of your body  · Heavy bleeding from your vagina  · Pain during sex, or if your partner feels the hard plastic of the IUD during sex  · Severe headache, vision changes  · Stomach or pelvic pain, tenderness, or cramping that is sudden or severe  · Vaginal discharge has a bad smell, fever, chills, sores on your genitals  · Yellow skin or eyes  If you notice these less serious side effects, talk with your doctor:   · Acne or other skin changes  · Breast pain  · Change in bleeding pattern after the first few months  · Dizziness or lightheadedness after IUD is placed  · Mild itching around your vagina and genitals  If you notice other side effects that you think are caused by this medicine, tell your doctor  Call your doctor for medical advice about side effects  You may report side effects to FDA at 4-084-FDA-9282  © 2017 [address] [employer] Information is for End User's use only and may not be sold, redistributed or otherwise used for commercial purposes  The above information is an  only  It is not intended as medical advice for individual conditions or treatments  Talk to your doctor, nurse or pharmacist before following any medical regimen to see if it is safe and effective for you

## 2018-05-10 LAB
ABSOL LYMPHOCYTES (HISTORICAL): 1.8 K/UL (ref 0.5–4)
ALBUMIN SERPL BCP-MCNC: 4.1 G/DL (ref 3–5.2)
ALP SERPL-CCNC: 66 U/L (ref 43–122)
ALT SERPL W P-5'-P-CCNC: 28 U/L (ref 9–52)
ANION GAP SERPL CALCULATED.3IONS-SCNC: 8 MMOL/L (ref 5–14)
AST SERPL W P-5'-P-CCNC: 18 U/L (ref 14–36)
BASOPHILS # BLD AUTO: 0 % (ref 0–1)
BASOPHILS # BLD AUTO: 0 K/UL (ref 0–0.1)
BILIRUB SERPL-MCNC: 0.5 MG/DL
BUN SERPL-MCNC: 9 MG/DL (ref 5–25)
CALCIUM SERPL-MCNC: 9.9 MG/DL (ref 8.4–10.2)
CHLORIDE SERPL-SCNC: 102 MEQ/L (ref 97–108)
CO2 SERPL-SCNC: 28 MMOL/L (ref 22–30)
CREATINE, SERUM (HISTORICAL): 0.56 MG/DL (ref 0.6–1.2)
DEPRECATED RDW RBC AUTO: 13.3 %
EGFR (HISTORICAL): >60 ML/MIN/1.73 M2
EOSINOPHIL # BLD AUTO: 0.4 K/UL (ref 0–0.4)
EOSINOPHIL NFR BLD AUTO: 5 % (ref 0–6)
GLUCOSE SERPL-MCNC: 99 MG/DL (ref 70–99)
HCT VFR BLD AUTO: 37.5 % (ref 36–46)
HGB BLD-MCNC: 12.9 G/DL (ref 12–16)
LIPASE SERPL-CCNC: 56 U/L (ref 23–300)
LYMPHOCYTES NFR BLD AUTO: 20 % (ref 25–45)
MCH RBC QN AUTO: 31.2 PG (ref 26–34)
MCHC RBC AUTO-ENTMCNC: 34.3 % (ref 31–36)
MCV RBC AUTO: 91 FL (ref 80–100)
MONOCYTES # BLD AUTO: 0.5 K/UL (ref 0.2–0.9)
MONOCYTES NFR BLD AUTO: 6 % (ref 1–10)
NEUTROPHILS ABS COUNT (HISTORICAL): 6.1 K/UL (ref 1.8–7.8)
NEUTS SEG NFR BLD AUTO: 69 % (ref 45–65)
PLATELET # BLD AUTO: 339 K/MCL (ref 150–450)
POTASSIUM SERPL-SCNC: 4.7 MEQ/L (ref 3.6–5)
RBC # BLD AUTO: 4.12 M/MCL (ref 4–5.2)
SODIUM SERPL-SCNC: 138 MEQ/L (ref 137–147)
TOTAL PROTEIN (HISTORICAL): 7.3 G/DL (ref 5.9–8.4)
WBC # BLD AUTO: 8.8 K/MCL (ref 4.5–11)

## 2019-02-07 ENCOUNTER — OFFICE VISIT (OUTPATIENT)
Dept: OBGYN CLINIC | Facility: CLINIC | Age: 29
End: 2019-02-07

## 2019-02-07 VITALS
BODY MASS INDEX: 37.77 KG/M2 | HEART RATE: 92 BPM | HEIGHT: 66 IN | DIASTOLIC BLOOD PRESSURE: 84 MMHG | WEIGHT: 235 LBS | SYSTOLIC BLOOD PRESSURE: 134 MMHG

## 2019-02-07 DIAGNOSIS — R10.2 PELVIC PAIN: Primary | ICD-10-CM

## 2019-02-07 DIAGNOSIS — Z30.431 IUD CHECK UP: ICD-10-CM

## 2019-02-07 PROCEDURE — 99214 OFFICE O/P EST MOD 30 MIN: CPT | Performed by: OBSTETRICS & GYNECOLOGY

## 2019-02-07 RX ORDER — IBUPROFEN 600 MG/1
600 TABLET ORAL EVERY 6 HOURS PRN
Status: SHIPPED | OUTPATIENT
Start: 2019-02-07

## 2019-02-07 NOTE — PROGRESS NOTES
[facility] 25 [facility]  SUBJECTIVE    CC:  "IUD check and pelvic pain"    HPI: Alma Delia Crenshaw is a 25 y o  P3 female presenting today for acute office visit  C/o lower abdominal and pelvic pain last 2 weeks  She has had IUD MIRENA 2 years ago after labor  She has been c/o of low abdominal pain  She decline urinary burning increase frequency  Decline constiation and diarrhea  For long time amenorrhea possibly due to [facility]  The following portions of the patient's history were reviewed and updated as appropriate: allergies, current medications, past family history, past medical history, past social history, past surgical history and problem list         ROS  General: negative for chills or fever   Respiratory: no cough, shortness of breath, or wheezing  Cardiovascular: no chest pain or dyspnea on exertion  Gastrointestinal: no abdominal pain, change in bowel habits, or black or bloody stools  Urinary: no urinary symptoms  Genitourinary: Pelvic Pain  Neurological: no TIA or stroke symptoms      OBJECTIVE  Vitals:    02/07/19 0856   BP: 134/84   Pulse: 92   Weight: 107 kg (235 lb)   Height: 5' 6" (1 676 m)       General appearance: alert and oriented, in no acute distress  Genitourinary exam: Vulva: normal appearing vulva with no masses, tenderness or lesions  Vagina: normal appearing vagina with normal color and discharge, no lesions  Cervix/Uterus: IUD string observed, Minimal tenderness with touch, normal size uterus  Adnexa: B/L tender to touch, no mass appreciated with palpation    Lab Results:   No visits with results within 1 Day(s) from this visit     Latest known visit with results is:   Orders Only on 05/10/2018   Component Date Value    WBC 05/10/2018 8 8     RBC 05/10/2018 4 12     Hemoglobin 05/10/2018 12 9     Hematocrit 05/10/2018 37 5     MCV 05/10/2018 91     MCH 05/10/2018 31 2     MCHC 05/10/2018 34 3     RDW 05/10/2018 13 3     Platelets 52/06/3743 339     Neutrophils Relative 05/10/2018 69*    NEUTROPHILS ABS COUNT 05/10/2018 6 1     ABSOL LYMPHOCYTES 05/10/2018 1 8     Monocytes Absolute 05/10/2018 0 5     Eosinophils Absolute 05/10/2018 0 4     Basophils Absolute 05/10/2018 0 0     Lymphocytes Relative 05/10/2018 20*    Monocytes Relative 05/10/2018 6     Eosinophils Relative 05/10/2018 5     Basophils Relative 05/10/2018 0     Glucose 05/10/2018 99     BUN 05/10/2018 9     CREATINE, SERUM 05/10/2018 0 56*    Sodium 05/10/2018 138     Potassium 05/10/2018 4 7     Chloride 05/10/2018 102     CO2 05/10/2018 28     Total Bilirubin 05/10/2018 0 5     Calcium 05/10/2018 9 9     Total Protein 05/10/2018 7 3     Albumin 05/10/2018 4 1     Alkaline Phosphatase 05/10/2018 66     AST 05/10/2018 18     ALT 05/10/2018 28     Anion Gap 05/10/2018 8     EGFR (HISTORICAL) 05/10/2018 >60     Lipase 05/10/2018 56               ASSESSMENT    Lower abdominal and pelvic pain, IUD is in    PLAN  1  Speculum exam, IUD strings observed  2  Pelvic TVUS  3  Recheck with US results  4  Annual physical exam dn Pap smear with HPV reflex recommended         All questions were answered &  expressed understanding      Patient was seen and discussed with Dr Freddie Mckay MD  OBGYN PGY-1  [unfilled]

## 2019-02-08 ENCOUNTER — HOSPITAL ENCOUNTER (OUTPATIENT)
Dept: ULTRASOUND IMAGING | Facility: HOSPITAL | Age: 29
Discharge: HOME | End: 2019-02-08
Payer: COMMERCIAL

## 2019-02-08 DIAGNOSIS — R10.2 PELVIC PAIN: ICD-10-CM

## 2019-02-08 DIAGNOSIS — Z30.431 IUD CHECK UP: ICD-10-CM

## 2019-02-08 PROCEDURE — 76830 TRANSVAGINAL US NON-OB: CPT

## 2019-02-08 PROCEDURE — 76856 US EXAM PELVIC COMPLETE: CPT

## 2019-02-08 NOTE — PROGRESS NOTES
- Patient had TVUS and It has reported as IUD is in the mid-lower uterine body/entering the cervical segment and should be repositioned    - Patient notified about the IUD need to be removed   - She may replace with new IUD also  - Will get appointment to clinic for removal    Caitlyn Albarran MD  OBGYN, PGY-1  2/8/2019  5:51 PM

## 2019-04-16 ENCOUNTER — HOSPITAL ENCOUNTER (EMERGENCY)
Facility: HOSPITAL | Age: 29
Discharge: HOME | End: 2019-04-16
Attending: EMERGENCY MEDICINE
Payer: COMMERCIAL

## 2019-04-16 VITALS
TEMPERATURE: 95.5 F | WEIGHT: 237 LBS | DIASTOLIC BLOOD PRESSURE: 65 MMHG | HEART RATE: 77 BPM | RESPIRATION RATE: 18 BRPM | SYSTOLIC BLOOD PRESSURE: 110 MMHG | OXYGEN SATURATION: 99 % | BODY MASS INDEX: 38.25 KG/M2

## 2019-04-16 DIAGNOSIS — R07.89 CHEST WALL PAIN: Primary | ICD-10-CM

## 2019-04-16 LAB
ALBUMIN SERPL BCP-MCNC: 4 G/DL (ref 3–5.2)
ALP SERPL-CCNC: 70 U/L (ref 43–122)
ALT SERPL W P-5'-P-CCNC: 15 U/L (ref 9–52)
ANION GAP SERPL CALCULATED.3IONS-SCNC: 10 MMOL/L (ref 5–14)
AST SERPL W P-5'-P-CCNC: 18 U/L (ref 14–36)
ATRIAL RATE: 75 BPM
BASOPHILS # BLD AUTO: 0 THOUSANDS/ΜL (ref 0–0.1)
BASOPHILS NFR BLD AUTO: 0 % (ref 0–1)
BILIRUB SERPL-MCNC: 0.4 MG/DL
BUN SERPL-MCNC: 17 MG/DL (ref 5–25)
CALCIUM SERPL-MCNC: 8.9 MG/DL (ref 8.4–10.2)
CHLORIDE SERPL-SCNC: 105 MMOL/L (ref 97–108)
CO2 SERPL-SCNC: 23 MMOL/L (ref 22–30)
CREAT SERPL-MCNC: 0.63 MG/DL (ref 0.6–1.2)
EOSINOPHIL # BLD AUTO: 0.1 THOUSAND/ΜL (ref 0–0.4)
EOSINOPHIL NFR BLD AUTO: 2 % (ref 0–6)
ERYTHROCYTE [DISTWIDTH] IN BLOOD BY AUTOMATED COUNT: 12.8 %
EXT PREG TEST URINE: NEGATIVE
GFR SERPL CREATININE-BSD FRML MDRD: 121 ML/MIN/1.73SQ M
GLUCOSE SERPL-MCNC: 100 MG/DL (ref 70–99)
HCT VFR BLD AUTO: 36.1 % (ref 36–46)
HGB BLD-MCNC: 12.3 G/DL (ref 12–16)
LIPASE SERPL-CCNC: 96 U/L (ref 23–300)
LYMPHOCYTES # BLD AUTO: 1.6 THOUSANDS/ΜL (ref 0.5–4)
LYMPHOCYTES NFR BLD AUTO: 19 % (ref 25–45)
MCH RBC QN AUTO: 32.9 PG (ref 26–34)
MCHC RBC AUTO-ENTMCNC: 33.9 G/DL (ref 31–36)
MCV RBC AUTO: 97 FL (ref 80–100)
MONOCYTES # BLD AUTO: 0.4 THOUSAND/ΜL (ref 0.2–0.9)
MONOCYTES NFR BLD AUTO: 5 % (ref 1–10)
NEUTROPHILS # BLD AUTO: 6.2 THOUSANDS/ΜL (ref 1.8–7.8)
NEUTS SEG NFR BLD AUTO: 74 % (ref 45–65)
P AXIS: 10 DEGREES
PLATELET # BLD AUTO: 346 THOUSANDS/UL (ref 150–450)
PMV BLD AUTO: 7.9 FL (ref 8.9–12.7)
POTASSIUM SERPL-SCNC: 4 MMOL/L (ref 3.6–5)
PR INTERVAL: 148 MS
PROT SERPL-MCNC: 6.9 G/DL (ref 5.9–8.4)
QRS AXIS: 16 DEGREES
QRSD INTERVAL: 88 MS
QT INTERVAL: 362 MS
QTC INTERVAL: 404 MS
RBC # BLD AUTO: 3.73 MILLION/UL (ref 4–5.2)
SODIUM SERPL-SCNC: 138 MMOL/L (ref 137–147)
T WAVE AXIS: 39 DEGREES
TROPONIN I SERPL-MCNC: <0.01 NG/ML (ref 0–0.03)
VENTRICULAR RATE: 75 BPM
WBC # BLD AUTO: 8.3 THOUSAND/UL (ref 4.5–11)

## 2019-04-16 PROCEDURE — 93005 ELECTROCARDIOGRAM TRACING: CPT

## 2019-04-16 PROCEDURE — 83690 ASSAY OF LIPASE: CPT | Performed by: EMERGENCY MEDICINE

## 2019-04-16 PROCEDURE — 36415 COLL VENOUS BLD VENIPUNCTURE: CPT | Performed by: EMERGENCY MEDICINE

## 2019-04-16 PROCEDURE — 93010 ELECTROCARDIOGRAM REPORT: CPT | Performed by: INTERNAL MEDICINE

## 2019-04-16 PROCEDURE — 81025 URINE PREGNANCY TEST: CPT | Performed by: EMERGENCY MEDICINE

## 2019-04-16 PROCEDURE — 84484 ASSAY OF TROPONIN QUANT: CPT | Performed by: EMERGENCY MEDICINE

## 2019-04-16 PROCEDURE — 99282 EMERGENCY DEPT VISIT SF MDM: CPT | Performed by: EMERGENCY MEDICINE

## 2019-04-16 PROCEDURE — 85025 COMPLETE CBC W/AUTO DIFF WBC: CPT | Performed by: EMERGENCY MEDICINE

## 2019-04-16 PROCEDURE — 99285 EMERGENCY DEPT VISIT HI MDM: CPT

## 2019-04-16 PROCEDURE — 80053 COMPREHEN METABOLIC PANEL: CPT | Performed by: EMERGENCY MEDICINE

## 2019-04-16 RX ORDER — ACETAMINOPHEN 325 MG/1
650 TABLET ORAL ONCE
Status: COMPLETED | OUTPATIENT
Start: 2019-04-16 | End: 2019-04-16

## 2019-04-16 RX ADMIN — ACETAMINOPHEN 650 MG: 325 TABLET ORAL at 05:05

## 2019-08-03 ENCOUNTER — HOSPITAL ENCOUNTER (EMERGENCY)
Facility: HOSPITAL | Age: 29
Discharge: HOME | End: 2019-08-03
Attending: EMERGENCY MEDICINE | Admitting: EMERGENCY MEDICINE
Payer: COMMERCIAL

## 2019-08-03 ENCOUNTER — APPOINTMENT (EMERGENCY)
Dept: CT IMAGING | Facility: HOSPITAL | Age: 29
End: 2019-08-03
Payer: COMMERCIAL

## 2019-08-03 VITALS
RESPIRATION RATE: 18 BRPM | TEMPERATURE: 97.7 F | DIASTOLIC BLOOD PRESSURE: 68 MMHG | OXYGEN SATURATION: 100 % | WEIGHT: 238.1 LBS | HEART RATE: 75 BPM | SYSTOLIC BLOOD PRESSURE: 128 MMHG | HEIGHT: 66 IN | BODY MASS INDEX: 38.27 KG/M2

## 2019-08-03 DIAGNOSIS — R56.9 WITNESSED SEIZURE-LIKE ACTIVITY (HCC): Primary | ICD-10-CM

## 2019-08-03 LAB
ALBUMIN SERPL BCP-MCNC: 4 G/DL (ref 3–5.2)
ALP SERPL-CCNC: 59 U/L (ref 43–122)
ALT SERPL W P-5'-P-CCNC: 21 U/L (ref 9–52)
AMPHETAMINES SERPL QL SCN: NEGATIVE
ANION GAP SERPL CALCULATED.3IONS-SCNC: 8 MMOL/L (ref 5–14)
AST SERPL W P-5'-P-CCNC: 20 U/L (ref 14–36)
ATRIAL RATE: 89 BPM
BACTERIA UR QL AUTO: ABNORMAL /HPF
BARBITURATES UR QL: NEGATIVE
BASOPHILS # BLD AUTO: 0 THOUSANDS/ΜL (ref 0–0.1)
BASOPHILS NFR BLD AUTO: 0 % (ref 0–1)
BENZODIAZ UR QL: NEGATIVE
BILIRUB SERPL-MCNC: 0.3 MG/DL
BILIRUB UR QL STRIP: NEGATIVE
BUN SERPL-MCNC: 7 MG/DL (ref 5–25)
CALCIUM SERPL-MCNC: 9 MG/DL (ref 8.4–10.2)
CHLORIDE SERPL-SCNC: 103 MMOL/L (ref 97–108)
CLARITY UR: CLEAR
CO2 SERPL-SCNC: 29 MMOL/L (ref 22–30)
COCAINE UR QL: NEGATIVE
COLOR UR: YELLOW
CREAT SERPL-MCNC: 0.61 MG/DL (ref 0.6–1.2)
EOSINOPHIL # BLD AUTO: 0.1 THOUSAND/ΜL (ref 0–0.4)
EOSINOPHIL NFR BLD AUTO: 2 % (ref 0–6)
ERYTHROCYTE [DISTWIDTH] IN BLOOD BY AUTOMATED COUNT: 13.1 %
ETHANOL SERPL-MCNC: <10 MG/DL (ref 0–10)
EXT PREG TEST URINE: NEGATIVE
EXT. CONTROL ED NAV: NORMAL
GFR SERPL CREATININE-BSD FRML MDRD: 122 ML/MIN/1.73SQ M
GLUCOSE SERPL-MCNC: 88 MG/DL (ref 65–140)
GLUCOSE SERPL-MCNC: 95 MG/DL (ref 70–99)
GLUCOSE UR STRIP-MCNC: NEGATIVE MG/DL
HCT VFR BLD AUTO: 39.3 % (ref 36–46)
HGB BLD-MCNC: 13.2 G/DL (ref 12–16)
HGB UR QL STRIP.AUTO: NEGATIVE
KETONES UR STRIP-MCNC: NEGATIVE MG/DL
LEUKOCYTE ESTERASE UR QL STRIP: NEGATIVE
LYMPHOCYTES # BLD AUTO: 1.7 THOUSANDS/ΜL (ref 0.5–4)
LYMPHOCYTES NFR BLD AUTO: 28 % (ref 25–45)
MAGNESIUM SERPL-MCNC: 1.9 MG/DL (ref 1.6–2.3)
MCH RBC QN AUTO: 32.2 PG (ref 26–34)
MCHC RBC AUTO-ENTMCNC: 33.7 G/DL (ref 31–36)
MCV RBC AUTO: 96 FL (ref 80–100)
METHADONE UR QL: NEGATIVE
MONOCYTES # BLD AUTO: 0.3 THOUSAND/ΜL (ref 0.2–0.9)
MONOCYTES NFR BLD AUTO: 6 % (ref 1–10)
MUCOUS THREADS UR QL AUTO: ABNORMAL
NEUTROPHILS # BLD AUTO: 3.8 THOUSANDS/ΜL (ref 1.8–7.8)
NEUTS SEG NFR BLD AUTO: 64 % (ref 45–65)
NITRITE UR QL STRIP: NEGATIVE
NON-SQ EPI CELLS URNS QL MICRO: ABNORMAL /HPF
OPIATES UR QL SCN: NEGATIVE
P AXIS: 47 DEGREES
PCP UR QL: NEGATIVE
PH UR STRIP.AUTO: 5 [PH]
PLATELET # BLD AUTO: 346 THOUSANDS/UL (ref 150–450)
PMV BLD AUTO: 8.2 FL (ref 8.9–12.7)
POTASSIUM SERPL-SCNC: 4.4 MMOL/L (ref 3.6–5)
PR INTERVAL: 150 MS
PROT SERPL-MCNC: 7.3 G/DL (ref 5.9–8.4)
PROT UR STRIP-MCNC: ABNORMAL MG/DL
QRS AXIS: 16 DEGREES
QRSD INTERVAL: 84 MS
QT INTERVAL: 342 MS
QTC INTERVAL: 416 MS
RBC # BLD AUTO: 4.11 MILLION/UL (ref 4–5.2)
RBC #/AREA URNS AUTO: ABNORMAL /HPF
SODIUM SERPL-SCNC: 140 MMOL/L (ref 137–147)
SP GR UR STRIP.AUTO: 1.02 (ref 1–1.04)
T WAVE AXIS: 39 DEGREES
THC UR QL: NEGATIVE
UROBILINOGEN UA: NEGATIVE MG/DL
VENTRICULAR RATE: 89 BPM
WBC # BLD AUTO: 6 THOUSAND/UL (ref 4.5–11)
WBC #/AREA URNS AUTO: ABNORMAL /HPF

## 2019-08-03 PROCEDURE — 70450 CT HEAD/BRAIN W/O DYE: CPT

## 2019-08-03 PROCEDURE — 81025 URINE PREGNANCY TEST: CPT | Performed by: EMERGENCY MEDICINE

## 2019-08-03 PROCEDURE — 36415 COLL VENOUS BLD VENIPUNCTURE: CPT | Performed by: EMERGENCY MEDICINE

## 2019-08-03 PROCEDURE — 82948 REAGENT STRIP/BLOOD GLUCOSE: CPT

## 2019-08-03 PROCEDURE — 80320 DRUG SCREEN QUANTALCOHOLS: CPT | Performed by: EMERGENCY MEDICINE

## 2019-08-03 PROCEDURE — 99285 EMERGENCY DEPT VISIT HI MDM: CPT

## 2019-08-03 PROCEDURE — 80307 DRUG TEST PRSMV CHEM ANLYZR: CPT | Performed by: EMERGENCY MEDICINE

## 2019-08-03 PROCEDURE — 93010 ELECTROCARDIOGRAM REPORT: CPT | Performed by: INTERNAL MEDICINE

## 2019-08-03 PROCEDURE — 99284 EMERGENCY DEPT VISIT MOD MDM: CPT | Performed by: EMERGENCY MEDICINE

## 2019-08-03 PROCEDURE — 85025 COMPLETE CBC W/AUTO DIFF WBC: CPT | Performed by: EMERGENCY MEDICINE

## 2019-08-03 PROCEDURE — 93005 ELECTROCARDIOGRAM TRACING: CPT

## 2019-08-03 PROCEDURE — 81001 URINALYSIS AUTO W/SCOPE: CPT | Performed by: EMERGENCY MEDICINE

## 2019-08-03 PROCEDURE — 83735 ASSAY OF MAGNESIUM: CPT | Performed by: EMERGENCY MEDICINE

## 2019-08-03 PROCEDURE — 80053 COMPREHEN METABOLIC PANEL: CPT | Performed by: EMERGENCY MEDICINE

## 2019-08-03 NOTE — ED PROVIDER NOTES
History  Chief Complaint   Patient presents with    Seizure - New Onset     patient arrives via EMS from home, as per EMS, the  said that she awoke him from sleep grunting and posturing  patient has no current complaints, denies recent illness, awake and oriented x1    47-year-old female, Pashto-speaking mostly, presents by EMS for concerns of seizures  Per EMS, patient was in her bed and began having with the described as posturing and shaking and grunting that woke her  up from sleep who called the EMS  Denies previous history of seizures  Denies tobacco, alcohol or recreational drug use  Unsure how long seizure episode lasted   used: Yes    Seizure - New Onset   Seizure activity on arrival: no    Seizure type:  Myoclonic  Initial focality:  None  Episode characteristics: abnormal movements and disorientation    Return to baseline: yes    Severity:  Mild  Timing:  Once  Number of seizures this episode:  1  Progression:  Resolved  Context: not family hx of seizures    Recent head injury:  No recent head injuries  PTA treatment:  None  History of seizures: no        Prior to Admission Medications   Prescriptions Last Dose Informant Patient Reported? Taking?   levonorgestrel (MIRENA) 20 MCG/24HR IUD   Yes No   Si each by Intrauterine route once      Facility-Administered Medications Last Administration Doses Remaining   ibuprofen (MOTRIN) tablet 600 mg None recorded           Past Medical History:   Diagnosis Date    Obesity affecting pregnancy in third trimester        Past Surgical History:   Procedure Laterality Date    ANKLE SURGERY Right      SECTION      [facility] N[facility]A [facility]    Procedure:  SECTION ();   Surgeon: Aliyah Arias MD;  Location: [facility];  Service: Obstetrics       Family History   Problem Relation Age of Onset    Diabetes Father      I have reviewed and agree with the history as documented  Social History     Tobacco Use    Smoking status: Never Smoker    Smokeless tobacco: Never Used   Substance Use Topics    Alcohol use: No    Drug use: No        Review of Systems   Constitutional: Negative  Negative for chills and fever  HENT: Negative  Negative for rhinorrhea, sore throat, trouble swallowing and voice change  Eyes: Negative  Negative for pain and visual disturbance  Respiratory: Negative  Negative for cough, shortness of breath and wheezing  Cardiovascular: Negative  Negative for chest pain and palpitations  Gastrointestinal: Negative for abdominal pain, diarrhea, nausea and vomiting  Genitourinary: Negative  Negative for dysuria and frequency  Musculoskeletal: Negative  Negative for neck pain and neck stiffness  Skin: Negative  Negative for rash  Neurological: Positive for seizures  Negative for dizziness, speech difficulty, weakness, light-headedness and numbness  Physical Exam  Physical Exam   Constitutional: She is oriented to person, place, and time  She appears well-developed and well-nourished  No distress  HENT:   Head: Normocephalic and atraumatic  Mouth/Throat: Oropharynx is clear and moist    Eyes: Pupils are equal, round, and reactive to light  Conjunctivae and EOM are normal    Neck: Normal range of motion  Neck supple  No tracheal deviation present  Cardiovascular: Normal rate, regular rhythm and intact distal pulses  Pulmonary/Chest: Effort normal and breath sounds normal  No respiratory distress  She has no wheezes  She has no rales  Abdominal: Soft  Bowel sounds are normal  She exhibits no distension  There is no tenderness  There is no rebound and no guarding  Musculoskeletal: Normal range of motion  She exhibits no tenderness or deformity  Neurological: She is alert and oriented to person, place, and time  She has normal strength  She displays no atrophy and no tremor  No cranial nerve deficit or sensory deficit  She displays a negative Romberg sign  She displays no seizure activity  Gait normal  GCS eye subscore is 4  GCS verbal subscore is 5  GCS motor subscore is 6  Skin: Skin is warm and dry  Capillary refill takes less than 2 seconds  No rash noted  Psychiatric: She has a normal mood and affect  Her behavior is normal    Nursing note and vitals reviewed  Vital Signs  ED Triage Vitals [08/03/19 0713]   Temperature Pulse Respirations Blood Pressure SpO2   97 7 °F (36 5 °C) 103 18 156/88 98 %      Temp Source Heart Rate Source Patient Position - Orthostatic VS BP Location FiO2 (%)   Tympanic Monitor Lying Left arm --      Pain Score       --           Vitals:    08/03/19 0819 08/03/19 0900 08/03/19 1000 08/03/19 1113   BP: 141/78 141/75 133/77 128/68   Pulse: 76 75 74 75   Patient Position - Orthostatic VS: Lying Lying Lying Lying         Visual Acuity      ED Medications  Medications - No data to display    Diagnostic Studies  Results Reviewed     Procedure Component Value Units Date/Time    Urine Microscopic [[id number]]  (Abnormal) Collected:  08/03/19 0752    Lab Status:  Final result Specimen:  Urine, Clean Catch Updated:  08/03/19 0831     RBC, UA 0-1 /hpf      WBC, UA 0-1 /hpf      Epithelial Cells Moderate /hpf      Bacteria, UA Occasional /hpf      MUCUS THREADS Occasional    Rapid drug screen, urine [[id number]]  (Normal) Collected:  08/03/19 0752    Lab Status:  Final result Specimen:  Urine, Clean Catch Updated:  08/03/19 0826     Amph/Meth UR Negative     Barbiturate Ur Negative     Benzodiazepine Urine Negative     Cocaine Urine Negative     Methadone Urine Negative     Opiate Urine Negative     PCP Ur Negative     THC Urine Negative    Narrative:       FOR MEDICAL PURPOSES ONLY  IF CONFIRMATION NEEDED PLEASE CONTACT THE LAB WITHIN 5 DAYS      Drug Screen Cutoff Levels:  AMPHETAMINE/METHAMPHETAMINES  1000 ng/mL  BARBITURATES     200 ng/mL  BENZODIAZEPINES     200 ng/mL  COCAINE      300 ng/mL  METHADONE      300 ng/mL  OPIATES      300 ng/mL  PHENCYCLIDINE     25 ng/mL  THC       50 ng/mL      Ethanol [[id number]]  (Normal) Collected:  08/03/19 0750    Lab Status:  Final result Specimen:  Blood from Arm, Right Updated:  08/03/19 0817     Ethanol Lvl <10 mg/dL     Magnesium [[id number]]  (Normal) Collected:  08/03/19 0750    Lab Status:  Final result Specimen:  Blood from Arm, Right Updated:  08/03/19 0816     Magnesium 1 9 mg/dL     Comprehensive metabolic panel [[id number]]  (Normal) Collected:  08/03/19 0750    Lab Status:  Final result Specimen:  Blood from Arm, Right Updated:  08/03/19 0816     Sodium 140 mmol/L      Potassium 4 4 mmol/L      Chloride 103 mmol/L      CO2 29 mmol/L      ANION GAP 8 mmol/L      BUN 7 mg/dL      Creatinine 0 61 mg/dL      Glucose 95 mg/dL      Calcium 9 0 mg/dL      AST 20 U/L      ALT 21 U/L      Alkaline Phosphatase 59 U/L      Total Protein 7 3 g/dL      Albumin 4 0 g/dL      Total Bilirubin 0 30 mg/dL      eGFR 122 ml/min/1 73sq m     Narrative:       [place] guidelines for Chronic Kidney Disease (CKD):     Stage 1 with normal or high GFR (GFR > 90 mL/min/1 73 square meters)    Stage 2 Mild CKD (GFR = 60-89 mL/min/1 73 square meters)    Stage 3A Moderate CKD (GFR = 45-59 mL/min/1 73 square meters)    Stage 3B Moderate CKD (GFR = 30-44 mL/min/1 73 square meters)    Stage 4 Severe CKD (GFR = 15-29 mL/min/1 73 square meters)    Stage 5 End Stage CKD (GFR <15 mL/min/1 73 square meters)  Note: GFR calculation is accurate only with a steady state creatinine    UA w Reflex to Microscopic [[id number]]  (Abnormal) Collected:  08/03/19 0752    Lab Status:  Final result Specimen:  Urine, Clean Catch Updated:  08/03/19 0813     Color, UA Yellow     Clarity, UA Clear     Specific Gravity, UA 1 025     pH, UA 5 0     Leukocytes, UA Negative     Nitrite, UA Negative     Protein, UA 30 (1+) mg/dl      Glucose, UA Negative mg/dl      Ketones, UA Negative mg/dl      Bilirubin, UA Negative     Blood, UA Negative     UROBILINOGEN UA Negative mg/dL     CBC and differential [[id number]]  (Abnormal) Collected:  08/03/19 0750    Lab Status:  Final result Specimen:  Blood from Arm, Right Updated:  08/03/19 0812     WBC 6 00 Thousand/uL      RBC 4 11 Million/uL      Hemoglobin 13 2 g/dL      Hematocrit 39 3 %      MCV 96 fL      MCH 32 2 pg      MCHC 33 7 g/dL      RDW 13 1 %      MPV 8 2 fL      Platelets 666 Thousands/uL      Neutrophils Relative 64 %      Lymphocytes Relative 28 %      Monocytes Relative 6 %      Eosinophils Relative 2 %      Basophils Relative 0 %      Neutrophils Absolute 3 80 Thousands/µL      Lymphocytes Absolute 1 70 Thousands/µL      Monocytes Absolute 0 30 Thousand/µL      Eosinophils Absolute 0 10 Thousand/µL      Basophils Absolute 0 00 Thousands/µL     POCT pregnancy, urine [[id number]]  (Normal) Resulted:  08/03/19 0755    Lab Status:  Final result Updated:  08/03/19 0755     EXT PREG TEST UR (Ref: Negative) negative     Control valid    Fingerstick Glucose (POCT) [[id number]]  (Normal) Collected:  08/03/19 0705    Lab Status:  Final result Updated:  08/03/19 0707     POC Glucose 88 mg/dl                  CT head without contrast   Final Result by Ezio Lopez DO (08/03 1389)   No acute intracranial abnormality  Workstation performed: [id number]                    Procedures  Procedures       ED Course  ED Course as of Aug 03 1143   Sat Aug 03, 2019   0646 Procedure Note: EKG  Date/Time: 08/03/19 7:37 AM   Performed by: Rachelle Sim  Authorized by: Rachelle Sim  ECG interpreted by me, the ED Provider: yes   The EKG demonstrates:  Rate 89  Rhythm sinus rhythm  QTc 416  No ST elevations/depressions          0851 Patient's workup unremarkable at this point  CT the brain was negative, electrolytes okay    Phone consult with Dr Rita James, recommends observation in the, if patient remains at baseline can at be scheduled for outpatient EEG  MDM  Number of Diagnoses or Management Options  Witnessed seizure-like activity [facility]):   Diagnosis management comments: 49-year-old female presented for questionable seizure-like activity  No seizure activity on arrival or postictal state  No intraoral injuries or loss of bowel or bladder incontinence  She has ambulated without difficulty  Has remained what appears to be her neurologic baseline, no family available at the bedside for full evaluation  After review of patient's presentation and lab work findings with the on-call neurologist, recommendation is to have the patient follow up as an outpatient for EEG testing  Patient states that she drives but only has a training permit  and is not on her  We have sent her information to the   [facility] of transportation for evaluation  Advised not to drive, swim, ride bikes or do any other activity that would preclude sent a danger to herself or others if you have another seizure  Amount and/or Complexity of Data Reviewed  Clinical lab tests: reviewed and ordered  Tests in the radiology section of CPT®: reviewed and ordered  Tests in the medicine section of CPT®: ordered and reviewed  Independent visualization of images, tracings, or specimens: yes    Patient Progress  Patient progress: improved      Disposition  Final diagnoses:    W[facility] ([facility]     Time reflects when diagnosis was documented in both MDM as applicable and the Disposition within this note     Time User Action Codes Description Comment    8/3/2019 11:01 AM Emily Humbles Add [R56 9] Seizure-like activity ([facility] )     8/3/2019 11:01 AM Emily Humbles Add [R56 9] Witnessed seizure-like activity ([facility] )     8/3/2019 11:01 AM Emily Humbles Modify [R56 9] Witnessed seizure-like activity ([facility] )     8/3/2019 11:01 AM Emily Humbles Remove [R56 9] Seizure-like activity [facility])       ED Disposition ED Disposition Condition Date/Time Comment    Discharge Stable Sat Aug 3, 2019 11:01 AM Brittany Hoffman discharge to home/self care  Follow-up Information     Follow up With Specialties Details Why Contact Info Additional Information    Bishop Sandifer, MD [facility]   [address]  [phone]       [facility] Neurology [facility] Neurology Go in 2 days Schedule outpatient EEG testing as soon as possible  [address] [address] [phone] [facility], [address] [place] [address] [place], [place], [address] [place] [address], [id number]          Discharge Medication List as of 8/3/2019 11:05 AM      CONTINUE these medications which have NOT CHANGED    Details   levonorgestrel (MIRENA) 20 MCG/24HR IUD 1 each by Intrauterine route once, Historical Med           No discharge procedures on file      ED Provider  Electronically Signed by           Mary Hernandez DO  08/03/19 1532

## 2019-12-14 ENCOUNTER — HOSPITAL ENCOUNTER (EMERGENCY)
Facility: HOSPITAL | Age: 29
Discharge: HOME | End: 2019-12-14
Attending: EMERGENCY MEDICINE | Admitting: EMERGENCY MEDICINE
Payer: COMMERCIAL

## 2019-12-14 VITALS
DIASTOLIC BLOOD PRESSURE: 61 MMHG | RESPIRATION RATE: 16 BRPM | OXYGEN SATURATION: 100 % | TEMPERATURE: 98.3 F | BODY MASS INDEX: 41.01 KG/M2 | SYSTOLIC BLOOD PRESSURE: 126 MMHG | HEART RATE: 98 BPM | WEIGHT: 254.1 LBS

## 2019-12-14 DIAGNOSIS — R56.9 SEIZURE-LIKE ACTIVITY (HCC): Primary | ICD-10-CM

## 2019-12-14 LAB
ALBUMIN SERPL BCP-MCNC: 4.2 G/DL (ref 3–5.2)
ALP SERPL-CCNC: 65 U/L (ref 43–122)
ALT SERPL W P-5'-P-CCNC: 31 U/L (ref 9–52)
AMPHETAMINES SERPL QL SCN: NEGATIVE
ANION GAP SERPL CALCULATED.3IONS-SCNC: 10 MMOL/L (ref 5–14)
APAP SERPL-MCNC: <10 UG/ML (ref 10–20)
APTT PPP: 28 SECONDS (ref 25–32)
AST SERPL W P-5'-P-CCNC: 21 U/L (ref 14–36)
ATRIAL RATE: 94 BPM
ATRIAL RATE: 96 BPM
ATRIAL RATE: 97 BPM
BARBITURATES UR QL: NEGATIVE
BASOPHILS # BLD AUTO: 0 THOUSANDS/ΜL (ref 0–0.1)
BASOPHILS NFR BLD AUTO: 0 % (ref 0–1)
BENZODIAZ UR QL: NEGATIVE
BILIRUB SERPL-MCNC: 0.4 MG/DL
BUN SERPL-MCNC: 10 MG/DL (ref 5–25)
CALCIUM SERPL-MCNC: 9.2 MG/DL (ref 8.4–10.2)
CHLORIDE SERPL-SCNC: 102 MMOL/L (ref 97–108)
CK SERPL-CCNC: 106 U/L (ref 30–135)
CO2 SERPL-SCNC: 25 MMOL/L (ref 22–30)
COCAINE UR QL: NEGATIVE
CREAT SERPL-MCNC: 0.54 MG/DL (ref 0.6–1.2)
EOSINOPHIL # BLD AUTO: 0 THOUSAND/ΜL (ref 0–0.4)
EOSINOPHIL NFR BLD AUTO: 0 % (ref 0–6)
ERYTHROCYTE [DISTWIDTH] IN BLOOD BY AUTOMATED COUNT: 12.7 %
ETHANOL SERPL-MCNC: <10 MG/DL (ref 0–10)
EXT PREG TEST URINE: NEGATIVE
EXT. CONTROL ED NAV: NORMAL
GFR SERPL CREATININE-BSD FRML MDRD: 126 ML/MIN/1.73SQ M
GLUCOSE SERPL-MCNC: 114 MG/DL (ref 70–99)
HCT VFR BLD AUTO: 40 % (ref 36–46)
HGB BLD-MCNC: 13.5 G/DL (ref 12–16)
INR PPP: 0.94 (ref 0.91–1.09)
LYMPHOCYTES # BLD AUTO: 1.2 THOUSANDS/ΜL (ref 0.5–4)
LYMPHOCYTES NFR BLD AUTO: 9 % (ref 25–45)
MCH RBC QN AUTO: 32.4 PG (ref 26–34)
MCHC RBC AUTO-ENTMCNC: 33.8 G/DL (ref 31–36)
MCV RBC AUTO: 96 FL (ref 80–100)
METHADONE UR QL: NEGATIVE
MONOCYTES # BLD AUTO: 0.6 THOUSAND/ΜL (ref 0.2–0.9)
MONOCYTES NFR BLD AUTO: 4 % (ref 1–10)
NEUTROPHILS # BLD AUTO: 12 THOUSANDS/ΜL (ref 1.8–7.8)
NEUTS SEG NFR BLD AUTO: 87 % (ref 45–65)
OPIATES UR QL SCN: NEGATIVE
P AXIS: 32 DEGREES
P AXIS: 40 DEGREES
P AXIS: 49 DEGREES
PCP UR QL: NEGATIVE
PLATELET # BLD AUTO: 395 THOUSANDS/UL (ref 150–450)
PMV BLD AUTO: 8.3 FL (ref 8.9–12.7)
POTASSIUM SERPL-SCNC: 4 MMOL/L (ref 3.6–5)
PR INTERVAL: 158 MS
PR INTERVAL: 160 MS
PR INTERVAL: 162 MS
PROT SERPL-MCNC: 7.5 G/DL (ref 5.9–8.4)
PROTHROMBIN TIME: 10.5 SECONDS (ref 9.8–12)
QRS AXIS: 24 DEGREES
QRS AXIS: 24 DEGREES
QRS AXIS: 34 DEGREES
QRSD INTERVAL: 84 MS
QRSD INTERVAL: 84 MS
QRSD INTERVAL: 86 MS
QT INTERVAL: 328 MS
QT INTERVAL: 336 MS
QT INTERVAL: 340 MS
QTC INTERVAL: 414 MS
QTC INTERVAL: 420 MS
QTC INTERVAL: 431 MS
RBC # BLD AUTO: 4.18 MILLION/UL (ref 4–5.2)
SALICYLATES SERPL-MCNC: <1 MG/DL (ref 10–30)
SODIUM SERPL-SCNC: 137 MMOL/L (ref 137–147)
T WAVE AXIS: 37 DEGREES
T WAVE AXIS: 40 DEGREES
T WAVE AXIS: 40 DEGREES
THC UR QL: NEGATIVE
VENTRICULAR RATE: 94 BPM
VENTRICULAR RATE: 96 BPM
VENTRICULAR RATE: 97 BPM
WBC # BLD AUTO: 13.8 THOUSAND/UL (ref 4.5–11)

## 2019-12-14 PROCEDURE — 80307 DRUG TEST PRSMV CHEM ANLYZR: CPT | Performed by: EMERGENCY MEDICINE

## 2019-12-14 PROCEDURE — 99284 EMERGENCY DEPT VISIT MOD MDM: CPT | Performed by: EMERGENCY MEDICINE

## 2019-12-14 PROCEDURE — 80329 ANALGESICS NON-OPIOID 1 OR 2: CPT | Performed by: EMERGENCY MEDICINE

## 2019-12-14 PROCEDURE — 85730 THROMBOPLASTIN TIME PARTIAL: CPT | Performed by: EMERGENCY MEDICINE

## 2019-12-14 PROCEDURE — 80053 COMPREHEN METABOLIC PANEL: CPT | Performed by: EMERGENCY MEDICINE

## 2019-12-14 PROCEDURE — 81025 URINE PREGNANCY TEST: CPT | Performed by: EMERGENCY MEDICINE

## 2019-12-14 PROCEDURE — 93010 ELECTROCARDIOGRAM REPORT: CPT | Performed by: INTERNAL MEDICINE

## 2019-12-14 PROCEDURE — 99284 EMERGENCY DEPT VISIT MOD MDM: CPT

## 2019-12-14 PROCEDURE — 80320 DRUG SCREEN QUANTALCOHOLS: CPT | Performed by: EMERGENCY MEDICINE

## 2019-12-14 PROCEDURE — 93005 ELECTROCARDIOGRAM TRACING: CPT

## 2019-12-14 PROCEDURE — 85025 COMPLETE CBC W/AUTO DIFF WBC: CPT | Performed by: EMERGENCY MEDICINE

## 2019-12-14 PROCEDURE — 85610 PROTHROMBIN TIME: CPT | Performed by: EMERGENCY MEDICINE

## 2019-12-14 PROCEDURE — 36415 COLL VENOUS BLD VENIPUNCTURE: CPT | Performed by: EMERGENCY MEDICINE

## 2019-12-14 PROCEDURE — 82550 ASSAY OF CK (CPK): CPT | Performed by: EMERGENCY MEDICINE

## 2019-12-14 RX ORDER — IBUPROFEN 600 MG/1
600 TABLET ORAL ONCE
Status: COMPLETED | OUTPATIENT
Start: 2019-12-14 | End: 2019-12-14

## 2019-12-14 RX ADMIN — IBUPROFEN 600 MG: 600 TABLET, FILM COATED ORAL at 14:21

## 2019-12-14 NOTE — ED NOTES
Patient ambulated to urinate with standby assistance  Patient was steady on her feet         Kristina Benites RN  12/14/19 5872

## 2019-12-14 NOTE — ED NOTES
Patient blood draw was attempted through straight stick by RN and ed tech unsuccessfully  Follow up attempt being performed be Serena Hdez, ROMAN  Provider Mayank Alexander RN  12/14/19 7197

## 2019-12-14 NOTE — ED PROVIDER NOTES
History  Chief Complaint   Patient presents with    Seizure - Prior Hx Of     Patient arrived via EMS alert and oriented  Patients boyfriend reported to EMS that he awoke to her seizing  Patient reports one prior seizure 2 months ago  HPI    This is a very pleasant 33-year-old female presents to the emergency department with a 1 minutes tonic colonic witnessed seizure-like activity as per the   Patient woke up at 10:40 a m  In the morning from the  found her shaking in bed  He said lasted less than 1 minutes  Patient was seen and evaluated recently back in August 3, 2019  At that time patient had a full workup including a CT scan of the head which was negative  Provider discussed the case with on-call Neurology and request the patient follow up in outpatient EEG  Patient has not done so because of her employment requirements  Patient has an appointment in the next few weeks for Neurology  They do not have a name of the neurologist   There was no urinary or bowel incontinence  There was no evidence of a tongue contusion or laceration  And there was no period of time where she was confused after the shaking episode  Prior to Admission Medications   Prescriptions Last Dose Informant Patient Reported? Taking?   levonorgestrel (MIRENA) 20 MCG/24HR IUD   Yes No   Si each by Intrauterine route once      Facility-Administered Medications Last Administration Doses Remaining   ibuprofen (MOTRIN) tablet 600 mg None recorded           Past Medical History:   Diagnosis Date    Obesity affecting pregnancy in third trimester        Past Surgical History:   Procedure Laterality Date    ANKLE SURGERY Right      SECTION      OK  DELIVERY ONLY N/A 2016    Procedure:  SECTION ();   Surgeon: Akash Gallego MD;  [facility];  Service: Obstetrics       Family History   Problem Relation Age of Onset    Diabetes Father      I have reviewed and agree with the history as documented  Social History     Tobacco Use    Smoking status: Never Smoker    Smokeless tobacco: Never Used   Substance Use Topics    Alcohol use: No    Drug use: No        Review of Systems   Constitutional: Negative  HENT: Negative  Eyes: Negative  Respiratory: Negative  Cardiovascular: Negative  Gastrointestinal: Negative  Endocrine: Negative  Genitourinary: Negative  Musculoskeletal: Negative  Skin: Negative  Allergic/Immunologic: Negative  Neurological: Negative  Hematological: Negative  Psychiatric/Behavioral: Negative  Physical Exam  Physical Exam   Constitutional: She is oriented to person, place, and time  She appears well-developed and well-nourished  HENT:   Head: Normocephalic and atraumatic  Right Ear: External ear normal    Left Ear: External ear normal    Nose: Nose normal    Mouth/Throat: Oropharynx is clear and moist    Eyes: Pupils are equal, round, and reactive to light  Conjunctivae and EOM are normal    Neck: Normal range of motion  Neck supple  Cardiovascular: Normal rate, regular rhythm, normal heart sounds and intact distal pulses  Pulmonary/Chest: Effort normal and breath sounds normal    Abdominal: Soft  Bowel sounds are normal    Genitourinary:   Genitourinary Comments: Exam deferred  Musculoskeletal: Normal range of motion  Neurological: She is alert and oriented to person, place, and time  Skin: Skin is warm and dry  Capillary refill takes less than 2 seconds  Psychiatric: She has a normal mood and affect  Her behavior is normal    Nursing note and vitals reviewed        Vital Signs  ED Triage Vitals   Temperature Pulse Respirations Blood Pressure SpO2   12/14/19 1124 12/14/19 1124 12/14/19 1124 12/14/19 1124 12/14/19 1124   98 3 °F (36 8 °C) 102 16 135/78 100 %      Temp Source Heart Rate Source Patient Position - Orthostatic VS BP Location FiO2 (%)   12/14/19 1124 12/14/19 1124 12/14/19 1124 12/14/19 1124 --   Tympanic Monitor Lying Left arm       Pain Score       12/14/19 1137       Worst Possible Pain           Vitals:    12/14/19 1124 12/14/19 1320 12/14/19 1454   BP: 135/78 134/63 126/61   Pulse: 102 102 98   Patient Position - Orthostatic VS: Lying Lying Lying         Visual Acuity      ED Medications  Medications   ibuprofen (MOTRIN) tablet 600 mg (600 mg Oral Given 12/14/19 1421)       Diagnostic Studies  Results Reviewed     Procedure Component Value Units Date/Time    Rapid drug screen, urine [[id number]]  (Normal) Collected:  12/14/19 1320    Lab Status:  Final result Specimen:  Urine, Clean Catch Updated:  12/14/19 1348     Amph/Meth UR Negative     Barbiturate Ur Negative     Benzodiazepine Urine Negative     Cocaine Urine Negative     Methadone Urine Negative     Opiate Urine Negative     PCP Ur Negative     THC Urine Negative    Narrative:       FOR MEDICAL PURPOSES ONLY  IF CONFIRMATION NEEDED PLEASE CONTACT THE LAB WITHIN 5 DAYS      Drug Screen Cutoff Levels:  AMPHETAMINE/METHAMPHETAMINES  1000 ng/mL  BARBITURATES     200 ng/mL  BENZODIAZEPINES     200 ng/mL  COCAINE      300 ng/mL  METHADONE      300 ng/mL  OPIATES      300 ng/mL  PHENCYCLIDINE     25 ng/mL  THC       50 ng/mL      POCT pregnancy, urine [[id number]]  (Normal) Resulted:  12/14/19 1320    Lab Status:  Final result Updated:  12/14/19 1321     EXT PREG TEST UR (Ref: Negative) negative     Control valid    Salicylate level [[id number]]  (Abnormal) Collected:  12/14/19 1250    Lab Status:  Final result Specimen:  Blood from Arm, Left Updated:  83/33/12 3552     Salicylate Lvl <9 4 mg/dL     CK (with reflex to MB) [[id number]]  (Normal) Collected:  12/14/19 1250    Lab Status:  Final result Specimen:  Blood from Arm, Left Updated:  12/14/19 1311     Total  U/L     Acetaminophen level-"If concentration is detectable, please discuss with medical  on call " [[id number]]  (Abnormal) Collected:  12/14/19 1250    Lab Status:  Final result Specimen:  Blood from Arm, Left Updated:  12/14/19 1311     Acetaminophen Level <10 ug/mL     Comprehensive metabolic panel [[id number]]  (Abnormal) Collected:  12/14/19 1250    Lab Status:  Final result Specimen:  Blood from Arm, Left Updated:  12/14/19 1311     Sodium 137 mmol/L      Potassium 4 0 mmol/L      Chloride 102 mmol/L      CO2 25 mmol/L      ANION GAP 10 mmol/L      BUN 10 mg/dL      Creatinine 0 54 mg/dL      Glucose 114 mg/dL      Calcium 9 2 mg/dL      AST 21 U/L      ALT 31 U/L      Alkaline Phosphatase 65 U/L      Total Protein 7 5 g/dL      Albumin 4 2 g/dL      Total Bilirubin 0 40 mg/dL      eGFR 126 ml/min/1 73sq m     Narrative:       [facility] guidelines for Chronic Kidney Disease (CKD):     Stage 1 with normal or high GFR (GFR > 90 mL/min/1 73 square meters)    Stage 2 Mild CKD (GFR = 60-89 mL/min/1 73 square meters)    Stage 3A Moderate CKD (GFR = 45-59 mL/min/1 73 square meters)    Stage 3B Moderate CKD (GFR = 30-44 mL/min/1 73 square meters)    Stage 4 Severe CKD (GFR = 15-29 mL/min/1 73 square meters)    Stage 5 End Stage CKD (GFR <15 mL/min/1 73 square meters)  Note: GFR calculation is accurate only with a steady state creatinine    APTT [[id number]]  (Normal) Collected:  12/14/19 1250    Lab Status:  Final result Specimen:  Blood from Arm, Left Updated:  12/14/19 1308     PTT 28 seconds     Protime-INR [[id number]]  (Normal) Collected:  12/14/19 1250    Lab Status:  Final result Specimen:  Blood from Arm, Left Updated:  12/14/19 1308     Protime 10 5 seconds      INR 0 94    Ethanol [[id number]]  (Normal) Collected:  12/14/19 1250    Lab Status:  Final result Specimen:  Blood from Arm, Left Updated:  12/14/19 1307     Ethanol Lvl <10 mg/dL     CBC and differential [[id number]]  (Abnormal) Collected:  12/14/19 1250    Lab Status:  Final result Specimen:  Blood from Arm, Left Updated:  12/14/19 1301     WBC 13 80 Thousand/uL      RBC 4 18 Million/uL      Hemoglobin 13 5 g/dL      Hematocrit 40 0 %      MCV 96 fL      MCH 32 4 pg      MCHC 33 8 g/dL      RDW 12 7 %      MPV 8 3 fL      Platelets 767 Thousands/uL      Neutrophils Relative 87 %      Lymphocytes Relative 9 %      Monocytes Relative 4 %      Eosinophils Relative 0 %      Basophils Relative 0 %      Neutrophils Absolute 12 00 Thousands/µL      Lymphocytes Absolute 1 20 Thousands/µL      Monocytes Absolute 0 60 Thousand/µL      Eosinophils Absolute 0 00 Thousand/µL      Basophils Absolute 0 00 Thousands/µL                  No orders to display              Procedures  ECG 12 Lead Documentation Only  Date/Time: 12/14/2019 11:40 AM  Performed by: Tiara Madison DO  Authorized by: Nahun Young III, DO     Indications / Diagnosis:  Seizure  ECG reviewed by me, the ED Provider: yes    Patient location:  ED  Comments:      I personally reviewed this EKG performed on the patient December 14, 2019  EKG was completed 11:35 a m  And interpreted by me at 11:40 a m   Normal sinus rhythm with ventricular rate of 97 beats per minute  No acute ST abnormalities noted  ED Course  ED Course as of Dec 14 1554   Sat Dec 14, 2019   1158 History and physical completed  Baseline labs ordered  Patient's  who witnessed the alleged seizure is currently in route after she was dropping off his daughter at a friend's house  Patient is still postictal and can offer limited description of what happened today  Patient did have an episode similar in August of 2019 never followed up because she had to go to work and never went to the appointment for the outpatient EEG  1550 Patient has been observed in the emergency department for 4-1/2 hours with no repeat seizures  Patient in need to follow up with a neurologist as directed for an outpatient EEG    Without evidence of a classic seizures specifically no postictal period, no post seizure confusion with urinary or bowel incontinence or tongue contusions consistent with tongue biting be hard to start the patient on anti lactic without evidence of seizure foci on an EEG  EKG showed no evidence of Brugada syndrome baseline labs unremarkable  Patient was not acidotic and a normal CK level  Will proceed with discharged with seizure precautions  MDM  Number of Diagnoses or Management Options  Seizure-like activity ([facility] [address] ): This is a very pleasant 20-year-old female presents to the emergency department with a less than 1 minutes  Of his seizure-like activity with her eyes rolled back the head showed generalized shaking  The  was woken up by this activity at 11:40 a m  To today  Patient has been evaluated and observed emergency department for over 4-1/2 hours  Baseline labs are normal including a bicarb including a CK level  Patient was seen and evaluated recently for similar presentation back in August but never followed up with Neurology because she works and she could work at around her work schedule  Patient has an appointment the next couple weeks specifically 2 weeks as per the  for Neurology appoint for an EEG  Given the fact the patient did not have a postictal period with no urinary or bowel incontinence baseline labs are normal it is unclear whether this truly was a seizure and epileptic foci  Will hold off on starting any anti eleptics  Seizure precautions given to the patient  Disposition  Final diagnoses:   Seizure-like activity ([facility] [address] )     Time reflects when diagnosis was documented in both MDM as applicable and the Disposition within this note     Time User Action Codes Description Comment    12/14/2019 12:52 PM Valeri Rubinstein Add [R56 9] Seizure-like activity [facility])       ED Disposition     ED Disposition Condition Date/Time Comment    Discharge Stable Sat Dec 14, 2019 12:54 PM Jorje Gore discharge to home/self care              Follow-up Information     Follow up With Specialties Details Why Contact Info Additional Information    Filemon Candelario MD Family Medicine   [address] [address] [place] [address]  [phone]       [facility]   [address] [address] [phone] [facility] [address]   [phone]          Patient's Medications   Discharge Prescriptions    No medications on file     No discharge procedures on file      ED Provider  Electronically Signed by           Tish Rios III, DO  12/14/19 4934

## 2020-09-22 ENCOUNTER — OFFICE VISIT (OUTPATIENT)
Dept: OBGYN CLINIC | Facility: CLINIC | Age: 30
End: 2020-09-22

## 2020-09-22 VITALS
BODY MASS INDEX: 37.16 KG/M2 | HEART RATE: 65 BPM | HEIGHT: 68 IN | DIASTOLIC BLOOD PRESSURE: 80 MMHG | WEIGHT: 245.2 LBS | SYSTOLIC BLOOD PRESSURE: 130 MMHG | TEMPERATURE: 97.6 F

## 2020-09-22 DIAGNOSIS — Z31.9 PATIENT DESIRES PREGNANCY: ICD-10-CM

## 2020-09-22 DIAGNOSIS — Z30.432 ENCOUNTER FOR IUD REMOVAL: Primary | ICD-10-CM

## 2020-09-22 PROCEDURE — 58301 REMOVE INTRAUTERINE DEVICE: CPT | Performed by: NURSE PRACTITIONER

## 2020-09-22 PROCEDURE — 99215 OFFICE O/P EST HI 40 MIN: CPT | Performed by: NURSE PRACTITIONER

## 2020-09-22 NOTE — PATIENT INSTRUCTIONS
Return for annual GYN exam and PAP  Call with positive pregnancy test  Call with needs or concerns    COVID-19 Instructions    If you are having any of the following:  Cough   Shortness of breath   Fever  If traveled within past 2 weeks internationally or to high risk [place] states  Or been in contact with someone that has     Please call either:   Your PCP office  [phone], option [phone]    They will screen you over the phone and direct you to the nearest appropriate testing location    DO NOT go to your PCP or OB office without calling first

## 2020-09-22 NOTE — PROGRESS NOTES
Assessment/Plan:         Diagnoses and all orders for this visit:    Encounter for IUD removal  -     Iud removal    Patient desires pregnancy      Discussed Folic acid intake    Plan  Return for annual GYN exam and PAP  Call with positive pregnancy test  Call with needs or concerns  Pt verbalized understanding of all discussed  Subjective:      Patient ID: Bettina Broussard is a 30 y o  female  HPI  PT presents for IUD removal  Pt was advised in 2/2019 to present for IUD removal because the IUD was located in the lower uterine segment in the cervical area  Pt returned today for removal because she desires pregnancy    The following portions of the patient's history were reviewed and updated as appropriate: allergies, current medications, past family history, past medical history, past social history, past surgical history and problem list     Review of Systems      Pertinent items are note in the HPI  Objective:      /80   Pulse 65   Temp 97 6 °F (36 4 °C)   Ht 5' 8" (1 727 m)   Wt 111 kg (245 lb 3 2 oz)   BMI 37 28 kg/m²          Physical Exam  Constitutional:       Appearance: Normal appearance  Eyes:      General:         Right eye: No discharge  Left eye: No discharge  Neck:      Musculoskeletal: Normal range of motion  Pulmonary:      Effort: Pulmonary effort is normal  No respiratory distress  Genitourinary:     General: Normal vulva  Vagina: No vaginal discharge  Musculoskeletal: Normal range of motion  Skin:     General: Skin is warm and dry  Neurological:      Mental Status: She is alert and oriented to person, place, and time  Psychiatric:         Mood and Affect: Mood normal          Behavior: Behavior normal          Thought Content: Thought content normal        Cervix negative lesions IUD noted at cervical os  Negative fever, cough or SOB    Iud removal    Date/Time: 9/22/2020 5:08 PM  Performed by: PILY Ramirez  Authorized by:  mAy PILY Romero     Consent:     Consent obtained:  Verbal and written    Consent given by:  Patient    Procedure risks and benefits discussed: yes      Patient questions answered: yes      Patient agrees, verbalizes understanding, and wants to proceed: yes      Instructions and paperwork completed: yes    Procedure:     Removed with no complications: yes      Removal due to mechanical complications of IUD: no      Removal due to infection and inflammatory reaction: no      Other reason for removal:  Pt desires pregnancy, 2/2019 pt was couseled IUD was in the lower uterine segment, it was advisede  IUD should be removed at that time

## 2021-11-09 ENCOUNTER — ANNUAL EXAM (OUTPATIENT)
Dept: OBGYN CLINIC | Facility: CLINIC | Age: 31
End: 2021-11-09

## 2021-11-09 VITALS
HEART RATE: 79 BPM | DIASTOLIC BLOOD PRESSURE: 84 MMHG | SYSTOLIC BLOOD PRESSURE: 136 MMHG | WEIGHT: 251 LBS | BODY MASS INDEX: 38.16 KG/M2

## 2021-11-09 DIAGNOSIS — Z01.419 ENCOUNTER FOR ANNUAL ROUTINE GYNECOLOGICAL EXAMINATION: Primary | ICD-10-CM

## 2021-11-09 PROCEDURE — G0476 HPV COMBO ASSAY CA SCREEN: HCPCS | Performed by: NURSE PRACTITIONER

## 2021-11-09 PROCEDURE — 0503F POSTPARTUM CARE VISIT: CPT | Performed by: NURSE PRACTITIONER

## 2021-11-09 PROCEDURE — 99395 PREV VISIT EST AGE 18-39: CPT | Performed by: NURSE PRACTITIONER

## 2021-11-09 PROCEDURE — G0145 SCR C/V CYTO,THINLAYER,RESCR: HCPCS | Performed by: NURSE PRACTITIONER

## 2021-11-09 RX ORDER — LEVETIRACETAM 250 MG/1
250 TABLET ORAL 2 TIMES DAILY
COMMUNITY

## 2021-11-12 LAB
HPV HR 12 DNA CVX QL NAA+PROBE: NEGATIVE
HPV16 DNA CVX QL NAA+PROBE: NEGATIVE
HPV18 DNA CVX QL NAA+PROBE: NEGATIVE

## 2021-11-15 LAB
LAB AP GYN PRIMARY INTERPRETATION: NORMAL
Lab: NORMAL

## 2022-08-30 ENCOUNTER — APPOINTMENT (OUTPATIENT)
Dept: LAB | Facility: CLINIC | Age: 32
End: 2022-08-30
Payer: COMMERCIAL

## 2022-08-30 ENCOUNTER — ULTRASOUND (OUTPATIENT)
Dept: OBGYN CLINIC | Facility: CLINIC | Age: 32
End: 2022-08-30

## 2022-08-30 VITALS
DIASTOLIC BLOOD PRESSURE: 100 MMHG | WEIGHT: 264 LBS | BODY MASS INDEX: 42.43 KG/M2 | SYSTOLIC BLOOD PRESSURE: 147 MMHG | HEIGHT: 66 IN

## 2022-08-30 DIAGNOSIS — N91.2 AMENORRHEA: Primary | ICD-10-CM

## 2022-08-30 DIAGNOSIS — N91.2 AMENORRHEA: ICD-10-CM

## 2022-08-30 LAB
ABO GROUP BLD: NORMAL
B-HCG SERPL-ACNC: 138 MIU/ML
BLD GP AB SCN SERPL QL: NEGATIVE
RH BLD: POSITIVE
SPECIMEN EXPIRATION DATE: NORMAL

## 2022-08-30 PROCEDURE — 99214 OFFICE O/P EST MOD 30 MIN: CPT | Performed by: OBSTETRICS & GYNECOLOGY

## 2022-08-30 PROCEDURE — 86850 RBC ANTIBODY SCREEN: CPT

## 2022-08-30 PROCEDURE — 76817 TRANSVAGINAL US OBSTETRIC: CPT | Performed by: OBSTETRICS & GYNECOLOGY

## 2022-08-30 PROCEDURE — 86901 BLOOD TYPING SEROLOGIC RH(D): CPT

## 2022-08-30 PROCEDURE — 86900 BLOOD TYPING SEROLOGIC ABO: CPT

## 2022-08-30 PROCEDURE — 36415 COLL VENOUS BLD VENIPUNCTURE: CPT

## 2022-08-30 PROCEDURE — 81025 URINE PREGNANCY TEST: CPT | Performed by: OBSTETRICS & GYNECOLOGY

## 2022-08-30 PROCEDURE — 84702 CHORIONIC GONADOTROPIN TEST: CPT

## 2022-08-30 NOTE — PROGRESS NOTES
Viability Scan    S: 31 y o y o   who presents for viability scan with LMP of 7/10/22  She is 7 weeks and 2 days by her LMP  She denies cramping or vaginal bleeding  This is  a planned and welcomed pregnancy  She previously had a  delivery for breech presentation  O:  Vitals:    22 1122   BP: 147/100   Weight: 120 kg (264 lb)   Height: 5' 6" (1 676 m)       TVUS:   No IUP visualized       A/P:  UPT positive  bHCG ordered  Type and screen  RTC for repeat viability pending Ezequiel Thapa MD  OB/GYN PGY-1  2022  11:58 AM

## 2022-08-31 DIAGNOSIS — N91.2 AMENORRHEA: Primary | ICD-10-CM

## 2022-09-02 ENCOUNTER — TELEPHONE (OUTPATIENT)
Dept: OBGYN CLINIC | Facility: CLINIC | Age: 32
End: 2022-09-02

## 2022-09-02 ENCOUNTER — HOSPITAL ENCOUNTER (EMERGENCY)
Facility: HOSPITAL | Age: 32
Discharge: HOME | End: 2022-09-02
Attending: EMERGENCY MEDICINE
Payer: COMMERCIAL

## 2022-09-02 VITALS
WEIGHT: 260.3 LBS | SYSTOLIC BLOOD PRESSURE: 146 MMHG | BODY MASS INDEX: 42.01 KG/M2 | HEART RATE: 65 BPM | TEMPERATURE: 97.2 F | DIASTOLIC BLOOD PRESSURE: 80 MMHG | OXYGEN SATURATION: 99 % | RESPIRATION RATE: 18 BRPM

## 2022-09-02 DIAGNOSIS — O03.9 MISCARRIAGE: Primary | ICD-10-CM

## 2022-09-02 LAB
ALBUMIN SERPL BCP-MCNC: 4.1 G/DL (ref 3.5–5)
ALP SERPL-CCNC: 57 U/L (ref 43–122)
ALT SERPL W P-5'-P-CCNC: 33 U/L
ANION GAP SERPL CALCULATED.3IONS-SCNC: 5 MMOL/L (ref 5–14)
AST SERPL W P-5'-P-CCNC: 24 U/L (ref 14–36)
B-HCG SERPL-ACNC: 35.77 MIU/ML
BASOPHILS # BLD AUTO: 0.01 THOUSANDS/ΜL (ref 0–0.1)
BASOPHILS NFR BLD AUTO: 0 % (ref 0–1)
BILIRUB SERPL-MCNC: 0.36 MG/DL (ref 0.2–1)
BUN SERPL-MCNC: 8 MG/DL (ref 5–25)
CALCIUM SERPL-MCNC: 8.7 MG/DL (ref 8.4–10.2)
CHLORIDE SERPL-SCNC: 102 MMOL/L (ref 96–108)
CO2 SERPL-SCNC: 30 MMOL/L (ref 21–32)
CREAT SERPL-MCNC: 0.67 MG/DL (ref 0.6–1.2)
EOSINOPHIL # BLD AUTO: 0.14 THOUSAND/ΜL (ref 0–0.61)
EOSINOPHIL NFR BLD AUTO: 2 % (ref 0–6)
ERYTHROCYTE [DISTWIDTH] IN BLOOD BY AUTOMATED COUNT: 12.5 % (ref 11.6–15.1)
GFR SERPL CREATININE-BSD FRML MDRD: 115 ML/MIN/1.73SQ M
GLUCOSE SERPL-MCNC: 90 MG/DL (ref 70–99)
HCT VFR BLD AUTO: 34.7 % (ref 34.8–46.1)
HGB BLD-MCNC: 11.1 G/DL (ref 11.5–15.4)
IMM GRANULOCYTES # BLD AUTO: 0.02 THOUSAND/UL (ref 0–0.2)
IMM GRANULOCYTES NFR BLD AUTO: 0 % (ref 0–2)
LYMPHOCYTES # BLD AUTO: 2.58 THOUSANDS/ΜL (ref 0.6–4.47)
LYMPHOCYTES NFR BLD AUTO: 34 % (ref 14–44)
MCH RBC QN AUTO: 31.5 PG (ref 26.8–34.3)
MCHC RBC AUTO-ENTMCNC: 32 G/DL (ref 31.4–37.4)
MCV RBC AUTO: 99 FL (ref 82–98)
MONOCYTES # BLD AUTO: 0.33 THOUSAND/ΜL (ref 0.17–1.22)
MONOCYTES NFR BLD AUTO: 4 % (ref 4–12)
NEUTROPHILS # BLD AUTO: 4.42 THOUSANDS/ΜL (ref 1.85–7.62)
NEUTS SEG NFR BLD AUTO: 60 % (ref 43–75)
NRBC BLD AUTO-RTO: 0 /100 WBCS
PLATELET # BLD AUTO: 314 THOUSANDS/UL (ref 149–390)
PMV BLD AUTO: 9.6 FL (ref 8.9–12.7)
POTASSIUM SERPL-SCNC: 3.6 MMOL/L (ref 3.5–5.3)
PROT SERPL-MCNC: 7.3 G/DL (ref 6.4–8.4)
RBC # BLD AUTO: 3.52 MILLION/UL (ref 3.81–5.12)
SODIUM SERPL-SCNC: 137 MMOL/L (ref 135–147)
WBC # BLD AUTO: 7.5 THOUSAND/UL (ref 4.31–10.16)

## 2022-09-02 PROCEDURE — 76801 OB US < 14 WKS SINGLE FETUS: CPT | Performed by: EMERGENCY MEDICINE

## 2022-09-02 PROCEDURE — 99284 EMERGENCY DEPT VISIT MOD MDM: CPT | Performed by: EMERGENCY MEDICINE

## 2022-09-02 PROCEDURE — 84702 CHORIONIC GONADOTROPIN TEST: CPT

## 2022-09-02 PROCEDURE — 85025 COMPLETE CBC W/AUTO DIFF WBC: CPT

## 2022-09-02 PROCEDURE — 80053 COMPREHEN METABOLIC PANEL: CPT

## 2022-09-02 PROCEDURE — 36415 COLL VENOUS BLD VENIPUNCTURE: CPT

## 2022-09-02 PROCEDURE — 99283 EMERGENCY DEPT VISIT LOW MDM: CPT

## 2022-09-02 NOTE — Clinical Note
Anthony Kenyon was seen and treated in our emergency department on 9/2/2022  Diagnosis:     Dara Prince  may return to work on return date, is off the rest of the shift today  She may return on this date: 09/06/2022    Can return sooner if feeling okay     If you have any questions or concerns, please don't hesitate to call        Chrystal Euceda MD    ______________________________           _______________          _______________  Hospital Representative                              Date                                Time

## 2022-09-02 NOTE — Clinical Note
juan miguel Gillespie to the emergency department on 9/2/2022  Return date if applicable: 39/36/7322        If you have any questions or concerns, please don't hesitate to call        Maxwell Begum MD

## 2022-09-02 NOTE — Clinical Note
Antonette Calhoun was seen and treated in our emergency department on 9/2/2022  Diagnosis:     Meli Snider  may return to work on return date, is off the rest of the shift today  She may return on this date: 09/06/2022    Can return sooner if feeling okay     If you have any questions or concerns, please don't hesitate to call        Jamar Orozco MD    ______________________________           _______________          _______________  Hospital Representative                              Date                                Time

## 2022-09-02 NOTE — Clinical Note
accompanied Larry Caicedo to the emergency department on 9/2/2022  Return date if applicable: 97/28/3972        If you have any questions or concerns, please don't hesitate to call        Sunni Ulloa MD

## 2022-09-02 NOTE — TELEPHONE ENCOUNTER
Patient is Pakistani speaking, Dr Radha Whitney translated  Called patient to ask her to obtain another quantitative HCG level  Her previous level on Tues 8/31/22 was 132  She was upset and very agitated   She said she was bleeding, at work, and did not want to obtain another test     Di Titus MD  09/02/22  1:09 PM

## 2022-09-02 NOTE — ED PROVIDER NOTES
History  Chief Complaint   Patient presents with    Vaginal Bleeding     Pt came to ER c/o vaginal bleeding during pregnancy  Pt is 7 weeks pregnant  Pt reports she is using 2 pads per hour and the clots are the size of a dime  Pt c/o suprapubic pain  Pt denies urinary complaints  HPI obtained using Ukrainian-speaking  on ipad:    Yulia Butler is a 33year old  currently 7 weeks and 5 days gestation by LMP on 7/10/22 presenting to the ED due to vaginal bleeding and suprapubic cramping  Sx's started yesterday  Pt states she has been passing clots about the size of dimes and has been using 2 pads per hour  Has not taken any medications for her sx's  Pt went to OBGYN on , beta hcg at that time=138, no IUP was visualized on US, Rh+  Pt's OBGYN is Dr Brooke Lazo [address]  Denies CP, Sob, dizziness, pre-syncope or syncope, urinary sx's, vaginal discharge or foul odor  Prior to Admission Medications   Prescriptions Last Dose Informant Patient Reported? Taking?   levETIRAcetam (KEPPRA) 250 mg tablet   Yes No   Sig: Take 250 mg by mouth 2 (two) times a day   levonorgestrel (MIRENA) 20 MCG/24HR IUD   Yes No   Si each by Intrauterine route once   Patient not taking: Reported on 2021       Facility-Administered Medications Last Administration Doses Remaining   ibuprofen (MOTRIN) tablet 600 mg None recorded           Past Medical History:   Diagnosis Date    Obesity affecting pregnancy in third trimester     Seizures ([facility] [address] )        Past Surgical History:   Procedure Laterality Date    ANKLE SURGERY Right      SECTION      [place]  DELIVERY ONLY N/A 2016    Procedure:  SECTION (); Surgeon: July Leahy MD;  Location: [facility];  Service: Obstetrics       Family History   Problem Relation Age of Onset    Diabetes Father      I have reviewed and agree with the history as documented      E-Cigarette/Vaping     E-Cigarette/Vaping Substances     Social History     Tobacco Use    Smoking status: Never Smoker    Smokeless tobacco: Never Used   Substance Use Topics    Alcohol use: No    Drug use: Never        Review of Systems   Constitutional: Negative for chills and fever  HENT: Negative for ear pain and sore throat  Eyes: Negative for pain and visual disturbance  Respiratory: Negative for cough and shortness of breath  Cardiovascular: Negative for chest pain and palpitations  Gastrointestinal: Positive for abdominal pain  Negative for vomiting  Genitourinary: Positive for vaginal bleeding  Negative for dysuria and hematuria  Musculoskeletal: Negative for arthralgias and back pain  Skin: Negative for color change and rash  Neurological: Negative for seizures and syncope  All other systems reviewed and are negative  Physical Exam  ED Triage Vitals [09/02/22 1619]   Temperature Pulse Respirations Blood Pressure SpO2   (!) 97 2 °F (36 2 °C) 65 18 146/80 99 %      Temp Source Heart Rate Source Patient Position - Orthostatic VS BP Location FiO2 (%)   Oral Monitor -- -- --      Pain Score       --             Orthostatic Vital Signs  Vitals:    09/02/22 1619   BP: 146/80   Pulse: 65       Physical Exam  Vitals and nursing note reviewed  Constitutional:       General: She is not in acute distress  Appearance: She is well-developed  HENT:      Head: Normocephalic and atraumatic  Eyes:      Conjunctiva/sclera: Conjunctivae normal    Cardiovascular:      Rate and Rhythm: Normal rate and regular rhythm  Pulses:           Radial pulses are 2+ on the right side and 2+ on the left side  Heart sounds: Normal heart sounds  No murmur heard  Pulmonary:      Effort: Pulmonary effort is normal  No respiratory distress  Breath sounds: Normal breath sounds  Abdominal:      Palpations: Abdomen is soft  Tenderness: There is no abdominal tenderness        Comments: Abdomen soft, nontender, nonperitoneal Musculoskeletal:      Cervical back: Neck supple  Right lower leg: No edema  Left lower leg: No edema  Skin:     General: Skin is warm and dry  Neurological:      Mental Status: She is alert           ED Medications  Medications - No data to display    Diagnostic Studies  Results Reviewed     Procedure Component Value Units Date/Time    hCG, quantitative [[id number]]  (Abnormal) Collected: 09/02/22 1718    Lab Status: Final result Specimen: Blood from Arm, Right Updated: 09/02/22 1754     HCG, Quant 35 77 mIU/mL     Narrative:       Expected Ranges:     Approximate               Approximate HCG  Gestation age          Concentration ( mIU/mL)  _____________          ______________________   Muir Clements                      HCG values  0 2-1                       5-50  1-2                           2-3                         100-5000  3-4                         500-92053  4-5                         1000-08957  5-6                         [id number]  6-8                         28121-140331  8-12                        [id number]      Comprehensive metabolic panel [[id number]] Collected: 09/02/22 1718    Lab Status: Final result Specimen: Blood from Arm, Right Updated: 09/02/22 1743     Sodium 137 mmol/L      Potassium 3 6 mmol/L      Chloride 102 mmol/L      CO2 30 mmol/L      ANION GAP 5 mmol/L      BUN 8 mg/dL      Creatinine 0 67 mg/dL      Glucose 90 mg/dL      Calcium 8 7 mg/dL      AST 24 U/L      ALT 33 U/L      Alkaline Phosphatase 57 U/L      Total Protein 7 3 g/dL      Albumin 4 1 g/dL      Total Bilirubin 0 36 mg/dL      eGFR 115 ml/min/1 73sq m     Narrative:      Amy guidelines for Chronic Kidney Disease (CKD):     Stage 1 with normal or high GFR (GFR > 90 mL/min/1 73 square meters)    Stage 2 Mild CKD (GFR = 60-89 mL/min/1 73 square meters)    Stage 3A Moderate CKD (GFR = 45-59 mL/min/1 73 square meters)    Stage 3B Moderate CKD (GFR = 30-44 mL/min/1 73 square meters)    Stage 4 Severe CKD (GFR = 15-29 mL/min/1 73 square meters)    Stage 5 End Stage CKD (GFR <15 mL/min/1 73 square meters)  Note: GFR calculation is accurate only with a steady state creatinine    CBC and differential [[id number]]  (Abnormal) Collected: 09/02/22 1718    Lab Status: Final result Specimen: Blood from Arm, Right Updated: 09/02/22 1727     WBC 7 50 Thousand/uL      RBC 3 52 Million/uL      Hemoglobin 11 1 g/dL      Hematocrit 34 7 %      MCV 99 fL      MCH 31 5 pg      MCHC 32 0 g/dL      RDW 12 5 %      MPV 9 6 fL      Platelets 656 Thousands/uL      nRBC 0 /100 WBCs      Neutrophils Relative 60 %      Immat GRANS % 0 %      Lymphocytes Relative 34 %      Monocytes Relative 4 %      Eosinophils Relative 2 %      Basophils Relative 0 %      Neutrophils Absolute 4 42 Thousands/µL      Immature Grans Absolute 0 02 Thousand/uL      Lymphocytes Absolute 2 58 Thousands/µL      Monocytes Absolute 0 33 Thousand/µL      Eosinophils Absolute 0 14 Thousand/µL      Basophils Absolute 0 01 Thousands/µL                  No orders to display         Procedures  POC Pelvic US    Date/Time: 9/2/2022 4:54 PM  Performed by: Frank Pete MD  Authorized by: Frank Pete MD     Patient location:  ED  Other Assisting Provider: Yes (comment) (Dr Estrella Virgen)    Procedure details:     Exam Type:  Diagnostic    Indications: evaluate for IUP, pregnant with abdominal pain and pregnant with vaginal bleeding      Assessment for: confirm intrauterine pregnancy      Technique:  Transabdominal obstetric (HCG+) exam    Views obtained: uterus (transverse and sagittal)      Image quality: non-diagnostic      Image availability:  Images available in PACS  Uterine findings:     Intrauterine pregnancy: not identified      Gestational sac: not identified      Yolk sac: not identified      Fetal pole: not identified      Fetal heart rate: not identified    Interpretation:     Ultrasound impressions: indeterminate      Pregnancy findings: indeterminate            ED Course  ED Course as of 09/02/22 1836   Fri Sep 02, 2022   1658 Bedside transabdominal US indeterminate  No visualization of yolk sac, gestational sac, fetal pole or FHR  Will correlate with beta quant  Pt aware/used    61 54 78 Pt would not like any medications for pain at this time   1800 HCG QUANTITATIVE(!): 35 77  Compare to 132 on 8/30/22                                       [facility]  Number of Diagnoses or Management Options  Miscarriage  Diagnosis management comments: 30yo female currently at 7 wks 5 days gestation with vaginal bleeding/passing clots  Pt was seen by OBGYN on 8/30 where Quantitative hcg=138 and no IUP visualized on US  Today, hcg=35 77  Again, no IUP was visualized on bedside TA ultrasound  With the beta quant results in conjunction with the ultrasound, unfortunately, pt had miscarriage  Did not require rhogam as pt is Rh (+)  Advised OBGYN and PCP follow up  Strict return precautions   was used for all interactions with patient  Amount and/or Complexity of Data Reviewed  Clinical lab tests: reviewed and ordered  Decide to obtain previous medical records or to obtain history from someone other than the patient: yes  Review and summarize past medical records: yes    Risk of Complications, Morbidity, and/or Mortality  Presenting problems: moderate  Diagnostic procedures: moderate  Management options: moderate    Patient Progress  Patient progress: stable      Disposition  Final diagnoses:   Miscarriage     Time reflects when diagnosis was documented in both MDM as applicable and the Disposition within this note     Time User Action Codes Description Comment    9/2/2022  6:01 PM Vania Vee Add [O03 9] Miscarriage       ED Disposition     ED Disposition   Discharge    Condition   Stable    Date/Time   Fri Sep 2, 2022  6:01 PM    Comment   Chloe Archer discharge to home/self care  Follow-up Information     Follow up With Specialties Details Why Contact Stalin Hogan MD Family Medicine Schedule an appointment as soon as possible for a visit in 2 days for follow up [address] [address] [place]  [phone]            Patient's Medications   Discharge Prescriptions    No medications on file     No discharge procedures on file  PDMP Review     None           ED Provider  Attending physically available and evaluated [facility]  I managed the patient along with the ED Attending      Electronically Signed by         Frank Pete MD  09/02/22 7276

## 2022-09-02 NOTE — ED ATTENDING ATTESTATION
2022  Ino LEAHY DO, saw and evaluated the patient  I have discussed the patient with the resident/non-physician practitioner and agree with the resident's/non-physician practitioner's findings, Plan of Care, and MDM as documented in the resident's/non-physician practitioner's note, except where noted  All available labs and Radiology studies were reviewed  I was present for key portions of any procedure(s) performed by the resident/non-physician practitioner and I was immediately available to provide assistance  At this point I agree with the current assessment done in the Emergency Department  I have conducted an independent evaluation of this patient a history and physical is as follows:    Patient presents for evaluation of vaginal bleeding with clots today  Symptoms began upon awakening this morning and have produced up to 1 cm diameter clots  Three days ago, she found out she was pregnant and saw her gyn where an IUP was unable to be found on ultrasound  Her beta quant at that time was 125  Based on her LMP, the patient thinks that she is 7 weeks and 5 days but this is unlikely based on the lack of an IUP on ultrasound  She is not having abdominal pain or other signs concerning for ectopic pregnancy at this time  She is she is   ROS: Denies f/c, CP, SOB, n/v, diarrhea or dysuria  No bleeding from other sites  12 system ROS o/w negative  PE: NAD, appears comfortable, alert; PERRL, EOMI; MMM, no posterior oropharyngeal exudate, edema or erythema; HRR, no murmur; lungs CTA w/o w/r/r, POx 99 % on RA (nl); abdomen s/ntnd, obese, no r/g/r, nl BS in all 4 quadrants; (-) LE edema or calf TTP, FROM extremities x4; skin p/w/d  DDx: Vaginal bleeding - threatened miscarriage, physiologic bleeding of pregnancy, vaginitis, hemorrhagic cystitis, anemia  A/P: Will check BS OB US, CBC, Beta-quant, treat symptoms as needed, reevaluate for further work up and disposition          ED Course         Critical Care Time  Procedures

## 2022-09-02 NOTE — DISCHARGE INSTRUCTIONS
Follow up with your obgyn  Can take motrin for pain  Return to ED if any chest pain, shortness of breath, feeling like you will pass out

## 2022-09-13 ENCOUNTER — ULTRASOUND (OUTPATIENT)
Dept: OBGYN CLINIC | Facility: CLINIC | Age: 32
End: 2022-09-13

## 2022-09-13 VITALS
HEART RATE: 79 BPM | BODY MASS INDEX: 41.3 KG/M2 | SYSTOLIC BLOOD PRESSURE: 133 MMHG | HEIGHT: 66 IN | DIASTOLIC BLOOD PRESSURE: 84 MMHG | WEIGHT: 257 LBS

## 2022-09-13 DIAGNOSIS — O03.9 SPONTANEOUS ABORTION: Primary | ICD-10-CM

## 2022-09-13 PROCEDURE — 76801 OB US < 14 WKS SINGLE FETUS: CPT | Performed by: OBSTETRICS & GYNECOLOGY

## 2022-09-13 PROCEDURE — 99213 OFFICE O/P EST LOW 20 MIN: CPT | Performed by: OBSTETRICS & GYNECOLOGY

## 2022-09-13 RX ORDER — FOLIC ACID 1 MG/1
TABLET ORAL
COMMUNITY
Start: 2022-09-04

## 2022-09-13 NOTE — PATIENT INSTRUCTIONS
Evelyne por herndon confianza en nuestro equipo  Jinx Skandia y agradecemos sarina comentarios  Si recibe shorty encuesta nuestra, tómese unos momentos para informarnos cómo estamos     Sinceramente,  [facility], DO

## 2022-09-13 NOTE — PROGRESS NOTES
Haily Ramesh is a 33 y o  female who presents today for viability scan was seen in ER for bleeding  Patient reports it as follows:    Past Medical History:   Diagnosis Date    Obesity affecting pregnancy in third trimester     Seizures ([address] )      Past Surgical History:   Procedure Laterality Date    ANKLE SURGERY Right      SECTION      [place]  DELIVERY ONLY N/A 2016    Procedure:  SECTION (); Surgeon: Amaury Valdez MD;  Location: [facility];  Service: Obstetrics     OB History        2    Para   1    Term   1       0    AB   0    Living   1       SAB   0    IAB   0    Ectopic   0    Multiple   0    Live Births   1               Social History     Tobacco Use    Smoking status: Never Smoker    Smokeless tobacco: Never Used   Substance Use Topics    Alcohol use: No    Drug use: Never     Social History     Substance and Sexual Activity   Sexual Activity Yes    Partners: Male       Current Outpatient Medications   Medication Instructions    folic acid (FOLVITE) 1 mg tablet TOME DOS TABLETAS POR V A ORAL TODOS LOS D AS    levETIRAcetam (KEPPRA) 250 mg, Oral, 2 times daily    levonorgestrel (MIRENA) 20 MCG/24HR IUD 1 each, Once       History of Present Illness:   Patient presents for viability scan with c/o was seen in the ER on 22  Patient stated LMP 7/10/22  ER performed bedside sonogram with no IUP noted  Presently patient states bleeding has stopped  Review of Systems:  Review of Systems   All other systems reviewed and are negative  Physical Exam:  /84   Pulse 79   Ht 5' 6" (1 676 m)   Wt 117 kg (257 lb)   LMP 07/10/2022   BMI 41 48 kg/m²   Physical Exam  Constitutional:       Appearance: Normal appearance  Genitourinary:      Vulva normal    Neurological:      Mental Status: She is alert  Vitals reviewed           Bedside sonogram:  FHT none  CRL none  Endometrial strip 0 18cm with evidence of retained POC nor IUP      Assessment:   1  Spontaneous     Plan:   1  Return to office prn  Reviewed with patient that test results are available in MyChart immediately, but that they will not necessarily be reviewed by me immediately  Explained that I will review results at my earliest opportunity and contact patient appropriately

## 2022-11-09 ENCOUNTER — ANNUAL EXAM (OUTPATIENT)
Dept: OBGYN CLINIC | Facility: CLINIC | Age: 32
End: 2022-11-09

## 2022-11-09 VITALS
HEART RATE: 67 BPM | DIASTOLIC BLOOD PRESSURE: 83 MMHG | HEIGHT: 66 IN | SYSTOLIC BLOOD PRESSURE: 137 MMHG | BODY MASS INDEX: 40.76 KG/M2 | WEIGHT: 253.6 LBS

## 2022-11-09 DIAGNOSIS — Z01.419 ENCOUNTER FOR ANNUAL ROUTINE GYNECOLOGICAL EXAMINATION: Primary | ICD-10-CM

## 2022-11-09 NOTE — PROGRESS NOTES
Annual Exam    Assessment   1  Encounter for annual routine gynecological examination       well woman       Plan       All questions answered  Breast self exam technique reviewed and patient encouraged to perform self-exam monthly  Contraception: none  Discussed healthy lifestyle modifications  Follow up in 1 year  Patient Instructions   Call  with needs or concerns  Return in 1 year  Pt verbalized understanding of all discussed  Lucy Gildardo is a 33 y o  [place] female who presents for annual well woman exam  Periods are regular every 28-30 days, lasting 5 days  No intermenstrual bleeding, spotting, or discharge  2021 WNL PAP and negative HPV    Depression Screening Follow-up Plan: Patient's depression screening was negative with a PHQ-2 score of 0  Their PHQ-9 score was 1  Clinically patient does not have depression  No treatment is required  BMI Counseling: Body mass index is 40 93 kg/m²  The BMI is above normal  Nutrition recommendations include reducing portion sizes, decreasing overall calorie intake, consuming healthier snacks, moderation in carbohydrate intake and increasing intake of lean protein  Exercise recommendations include moderate aerobic physical activity for 150 minutes/week  Current contraception: none, would accept a pregnancy if it occurred, taking Folic acid daily  History of abnormal Pap smear: no  Family history of uterine or ovarian cancer: no  Regular self breast exam: yes  History of abnormal mammogram: N/A  Family history of breast cancer: no  History of abnormal lipids: unknow  Menstrual History:  OB History        2    Para   1    Term   1       0    AB   0    Living   1       SAB   0    IAB   0    Ectopic   0    Multiple   0    Live Births   1                Menarche age: 13  Patient's last menstrual period was 10/25/2022 (exact date)         The following portions of the patient's history were reviewed and updated as appropriate: allergies, current medications, past family history, past medical history, past social history, past surgical history and problem list     Review of Systems  Pertinent items are noted in HPI        Objective      /83   Pulse 67   Ht 5' 6" (1 676 m)   Wt 115 kg (253 lb 9 6 oz)   LMP 10/25/2022 (Exact Date)   BMI 40 93 kg/m²     General: alert and oriented, in no acute distress, alert, appears stated age and cooperative   Heart: regular rate and rhythm, S1, S2 normal, no murmur, click, rub or gallop   Lungs: clear to auscultation bilaterally, WNL respiratory effort, negative cough or SOB   Thyroid: Negative masses   Abdomen: soft, non-tender, without masses or organomegaly, palpable   Vulva: normal   Vagina: normal mucosa   Cervix: no cervical motion tenderness and no lesions   Uterus: normal size, non-tender, normal shape and consistency   Adnexa: normal adnexa   Urethra: normal   Breasts: NT,negative masses, discharge, or dimpling

## 2022-11-09 NOTE — PATIENT INSTRUCTIONS
Call  with needs or concerns  Return in 1 year    COVID-19 Instructions    If you are having any of the following:  Cough   Shortness of breath   Fever  If traveled within past 2 weeks internationally or to high risk [place] states  Or been in contact with someone that has     Please call either:   Your PCP office  [phone], option [phone]    They will screen you over the phone and direct you to the nearest appropriate testing location    DO NOT go to your PCP or OB office without calling first Felix Villarreal

## 2023-08-07 ENCOUNTER — APPOINTMENT (EMERGENCY)
Dept: CT IMAGING | Facility: HOSPITAL | Age: 33
End: 2023-08-07
Payer: COMMERCIAL

## 2023-08-07 ENCOUNTER — HOSPITAL ENCOUNTER (EMERGENCY)
Facility: HOSPITAL | Age: 33
Discharge: HOME | End: 2023-08-07
Attending: EMERGENCY MEDICINE | Admitting: EMERGENCY MEDICINE
Payer: COMMERCIAL

## 2023-08-07 VITALS
BODY MASS INDEX: 38.11 KG/M2 | TEMPERATURE: 98.4 F | RESPIRATION RATE: 20 BRPM | WEIGHT: 236.11 LBS | DIASTOLIC BLOOD PRESSURE: 67 MMHG | OXYGEN SATURATION: 99 % | HEART RATE: 58 BPM | SYSTOLIC BLOOD PRESSURE: 120 MMHG

## 2023-08-07 DIAGNOSIS — R20.0 LEFT FACIAL NUMBNESS: Primary | ICD-10-CM

## 2023-08-07 LAB
ALBUMIN SERPL BCP-MCNC: 4.1 G/DL (ref 3.5–5)
ALP SERPL-CCNC: 56 U/L (ref 34–104)
ALT SERPL W P-5'-P-CCNC: 14 U/L (ref 7–52)
ANION GAP SERPL CALCULATED.3IONS-SCNC: 8 MMOL/L
APTT PPP: 31 SECONDS (ref 23–37)
AST SERPL W P-5'-P-CCNC: 14 U/L (ref 13–39)
BASOPHILS # BLD AUTO: 0.03 THOUSANDS/ÂΜL (ref 0–0.1)
BASOPHILS NFR BLD AUTO: 0 % (ref 0–1)
BILIRUB SERPL-MCNC: 0.4 MG/DL (ref 0.2–1)
BUN SERPL-MCNC: 10 MG/DL (ref 5–25)
CALCIUM SERPL-MCNC: 9 MG/DL (ref 8.4–10.2)
CHLORIDE SERPL-SCNC: 103 MMOL/L (ref 96–108)
CO2 SERPL-SCNC: 25 MMOL/L (ref 21–32)
CREAT SERPL-MCNC: 0.62 MG/DL (ref 0.6–1.3)
EOSINOPHIL # BLD AUTO: 0.13 THOUSAND/ÂΜL (ref 0–0.61)
EOSINOPHIL NFR BLD AUTO: 2 % (ref 0–6)
ERYTHROCYTE [DISTWIDTH] IN BLOOD BY AUTOMATED COUNT: 12.1 % (ref 11.6–15.1)
EXT PREGNANCY TEST URINE: NEGATIVE
EXT. CONTROL: NORMAL
GFR SERPL CREATININE-BSD FRML MDRD: 118 ML/MIN/1.73SQ M
GLUCOSE SERPL-MCNC: 74 MG/DL (ref 65–140)
HCT VFR BLD AUTO: 36.7 % (ref 34.8–46.1)
HGB BLD-MCNC: 12.1 G/DL (ref 11.5–15.4)
IMM GRANULOCYTES # BLD AUTO: 0.01 THOUSAND/UL (ref 0–0.2)
IMM GRANULOCYTES NFR BLD AUTO: 0 % (ref 0–2)
INR PPP: 0.9 (ref 0.84–1.19)
LYMPHOCYTES # BLD AUTO: 3.4 THOUSANDS/ÂΜL (ref 0.6–4.47)
LYMPHOCYTES NFR BLD AUTO: 42 % (ref 14–44)
MCH RBC QN AUTO: 32 PG (ref 26.8–34.3)
MCHC RBC AUTO-ENTMCNC: 33 G/DL (ref 31.4–37.4)
MCV RBC AUTO: 97 FL (ref 82–98)
MONOCYTES # BLD AUTO: 0.52 THOUSAND/ÂΜL (ref 0.17–1.22)
MONOCYTES NFR BLD AUTO: 7 % (ref 4–12)
NEUTROPHILS # BLD AUTO: 3.96 THOUSANDS/ÂΜL (ref 1.85–7.62)
NEUTS SEG NFR BLD AUTO: 49 % (ref 43–75)
NRBC BLD AUTO-RTO: 0 /100 WBCS
PLATELET # BLD AUTO: 345 THOUSANDS/UL (ref 149–390)
PMV BLD AUTO: 10.9 FL (ref 8.9–12.7)
POTASSIUM SERPL-SCNC: 3.8 MMOL/L (ref 3.5–5.3)
PROT SERPL-MCNC: 7.2 G/DL (ref 6.4–8.4)
PROTHROMBIN TIME: 12.5 SECONDS (ref 11.6–14.5)
RBC # BLD AUTO: 3.78 MILLION/UL (ref 3.81–5.12)
SODIUM SERPL-SCNC: 136 MMOL/L (ref 135–147)
WBC # BLD AUTO: 8.05 THOUSAND/UL (ref 4.31–10.16)

## 2023-08-07 PROCEDURE — 96360 HYDRATION IV INFUSION INIT: CPT

## 2023-08-07 PROCEDURE — 85610 PROTHROMBIN TIME: CPT | Performed by: EMERGENCY MEDICINE

## 2023-08-07 PROCEDURE — 96361 HYDRATE IV INFUSION ADD-ON: CPT

## 2023-08-07 PROCEDURE — G1004 CDSM NDSC: HCPCS

## 2023-08-07 PROCEDURE — 36415 COLL VENOUS BLD VENIPUNCTURE: CPT | Performed by: EMERGENCY MEDICINE

## 2023-08-07 PROCEDURE — 85730 THROMBOPLASTIN TIME PARTIAL: CPT | Performed by: EMERGENCY MEDICINE

## 2023-08-07 PROCEDURE — 80053 COMPREHEN METABOLIC PANEL: CPT | Performed by: EMERGENCY MEDICINE

## 2023-08-07 PROCEDURE — 70496 CT ANGIOGRAPHY HEAD: CPT

## 2023-08-07 PROCEDURE — 99284 EMERGENCY DEPT VISIT MOD MDM: CPT

## 2023-08-07 PROCEDURE — 81025 URINE PREGNANCY TEST: CPT | Performed by: EMERGENCY MEDICINE

## 2023-08-07 PROCEDURE — 93005 ELECTROCARDIOGRAM TRACING: CPT

## 2023-08-07 PROCEDURE — 85025 COMPLETE CBC W/AUTO DIFF WBC: CPT | Performed by: EMERGENCY MEDICINE

## 2023-08-07 PROCEDURE — 70498 CT ANGIOGRAPHY NECK: CPT

## 2023-08-07 RX ADMIN — IOHEXOL 100 ML: 350 INJECTION, SOLUTION INTRAVENOUS at 20:58

## 2023-08-07 RX ADMIN — SODIUM CHLORIDE 1000 ML: 0.9 INJECTION, SOLUTION INTRAVENOUS at 19:08

## 2023-08-07 NOTE — ED PROVIDER NOTES
History  Chief Complaint   Patient presents with   • Facial Numbness     Pt states itching to L face since Friday. 2:30 today, starting with L sided numbness. No meds pta for symptoms. States she has an appt tomorrow with her neurologist, is compliant with her keppra. 28 yo obese female with a history of a seizure disorder presents to the ED complaining of left sided facial numbness. The patient reports "itching" to her left face since Friday afternoon. Since that time she has slowly been developing a "numbness" to the left face as well. Today around 1430 the left face went "completely numb". The symptoms have improved somewhat since that time but the face still doesn't feel "right". The patient has no other symptoms. No facial asymmetry, no speech difficulty, and no visual disturbance. She has normal strength in all extremities. No dizziness or lightheadedness. She denies chest pain, shortness of breath, and cough. No neck or back pain. No other specific complaints. *Of note, the patient has been compliant with her Keppra. No recent seizure activity. Prior to Admission Medications   Prescriptions Last Dose Informant Patient Reported? Taking?   folic acid (FOLVITE) 1 mg tablet   Yes No   Sig: TOME DOS TABLETAS POR V A ORAL TODOS LOS D AS   levETIRAcetam (KEPPRA) 250 mg tablet   Yes No   Sig: Take 250 mg by mouth 2 (two) times a day   levonorgestrel (MIRENA) 20 MCG/24HR IUD   Yes No   Si each by Intrauterine route once   Patient not taking: No sig reported      Facility-Administered Medications Last Administration Doses Remaining   ibuprofen (MOTRIN) tablet 600 mg None recorded           Past Medical History:   Diagnosis Date   • Obesity affecting pregnancy in third trimester    • Seizures ([address])        Past Surgical History:   Procedure Laterality Date   • ANKLE SURGERY Right    •  SECTION     • [place]  DELIVERY ONLY N/A 2016    Procedure:  SECTION ();   Surgeon: Moncho Prince MD;  Location: [facility];  Service: Obstetrics       Family History   Problem Relation Age of Onset   • Diabetes Father      I have reviewed and agree with the history as documented. E-Cigarette/Vaping     E-Cigarette/Vaping Substances     Social History     Tobacco Use   • Smoking status: Never   • Smokeless tobacco: Never   Substance Use Topics   • Alcohol use: No   • Drug use: Never       Review of Systems   Constitutional: Negative for chills and fever. HENT: Negative for sore throat. Eyes: Negative for visual disturbance. Respiratory: Negative for shortness of breath. Cardiovascular: Negative for chest pain. Gastrointestinal: Negative for abdominal pain, diarrhea and vomiting. Endocrine: Negative for cold intolerance and heat intolerance. Genitourinary: Negative for dysuria and frequency. Musculoskeletal: Negative for back pain. Skin: Negative for rash. Allergic/Immunologic: Negative for immunocompromised state. Neurological: Positive for numbness. Negative for dizziness, facial asymmetry, weakness, light-headedness and headaches. Hematological: Negative for adenopathy. Psychiatric/Behavioral: Negative for self-injury. Physical Exam  Physical Exam  Constitutional:       General: She is not in acute distress. Appearance: She is well-developed. HENT:      Head: Normocephalic and atraumatic. Eyes:      General: No visual field deficit. Pupils: Pupils are equal, round, and reactive to light. Cardiovascular:      Rate and Rhythm: Normal rate and regular rhythm. Pulmonary:      Effort: Pulmonary effort is normal.      Breath sounds: Normal breath sounds. Abdominal:      General: There is no distension. Palpations: Abdomen is soft. Tenderness: There is no abdominal tenderness. Musculoskeletal:         General: Normal range of motion. Cervical back: Normal range of motion and neck supple.    Skin:     General: Skin is warm and dry.   Neurological:      Mental Status: She is alert and oriented to person, place, and time. Cranial Nerves: No dysarthria or facial asymmetry. Sensory: Sensory deficit present. Motor: Motor function is intact. Coordination: Coordination is intact. Gait: Gait is intact. Deep Tendon Reflexes: Reflexes are normal and symmetric. Comments: (+) Decreased sensation to left face.          Vital Signs  ED Triage Vitals [08/07/23 1837]   Temperature Pulse Respirations Blood Pressure SpO2   98.4 °F (36.9 °C) 90 20 127/71 98 %      Temp Source Heart Rate Source Patient Position - Orthostatic VS BP Location FiO2 (%)   Oral Monitor Lying Left arm --      Pain Score       --           Vitals:    08/07/23 1837 08/07/23 1911   BP: 127/71 120/67   Pulse: 90 58   Patient Position - Orthostatic VS: Lying Lying         Visual Acuity  Visual Acuity    Flowsheet Row Most Recent Value   L Pupil Size (mm) 3   R Pupil Size (mm) 3          ED Medications  Medications   sodium chloride 0.9 % bolus 1,000 mL (1,000 mL Intravenous New Bag 8/7/23 1908)   iohexol (OMNIPAQUE) 350 MG/ML injection (SINGLE-DOSE) 100 mL (100 mL Intravenous Given 8/7/23 2058)       Diagnostic Studies  Results Reviewed     Procedure Component Value Units Date/Time    Comprehensive metabolic panel [[id number]] Collected: 08/07/23 1908    Lab Status: Final result Specimen: Blood from Arm, Right Updated: 08/07/23 2009     Sodium 136 mmol/L      Potassium 3.8 mmol/L      Chloride 103 mmol/L      CO2 25 mmol/L      ANION GAP 8 mmol/L      BUN 10 mg/dL      Creatinine 0.62 mg/dL      Glucose 74 mg/dL      Calcium 9.0 mg/dL      AST 14 U/L      ALT 14 U/L      Alkaline Phosphatase 56 U/L      Total Protein 7.2 g/dL      Albumin 4.1 g/dL      Total Bilirubin 0.40 mg/dL      eGFR 118 ml/min/1.73sq m     Narrative:      [place] guidelines for Chronic Kidney Disease (CKD):   •  Stage 1 with normal or high GFR (GFR > 90 mL/min/1.73 square meters)  •  Stage 2 Mild CKD (GFR = 60-89 mL/min/1.73 square meters)  •  Stage 3A Moderate CKD (GFR = 45-59 mL/min/1.73 square meters)  •  Stage 3B Moderate CKD (GFR = 30-44 mL/min/1.73 square meters)  •  Stage 4 Severe CKD (GFR = 15-29 mL/min/1.73 square meters)  •  Stage 5 End Stage CKD (GFR <15 mL/min/1.73 square meters)  Note: GFR calculation is accurate only with a steady state creatinine    POCT pregnancy, urine [[id number]]  (Normal) Resulted: 08/07/23 1959    Lab Status: Final result Updated: 08/07/23 1959     EXT Preg Test, Ur Negative     Control Valid    Protime-INR [[id number]]  (Normal) Collected: 08/07/23 1908    Lab Status: Final result Specimen: Blood from Arm, Right Updated: 08/07/23 1938     Protime 12.5 seconds      INR 0.90    APTT [[id number]]  (Normal) Collected: 08/07/23 1908    Lab Status: Final result Specimen: Blood from Arm, Right Updated: 08/07/23 1938     PTT 31 seconds     CBC and differential [[id number]]  (Abnormal) Collected: 08/07/23 1908    Lab Status: Final result Specimen: Blood from Arm, Right Updated: 08/07/23 1929     WBC 8.05 Thousand/uL      RBC 3.78 Million/uL      Hemoglobin 12.1 g/dL      Hematocrit 36.7 %      MCV 97 fL      MCH 32.0 pg      MCHC 33.0 g/dL      RDW 12.1 %      MPV 10.9 fL      Platelets 766 Thousands/uL      nRBC 0 /100 WBCs      Neutrophils Relative 49 %      Immat GRANS % 0 %      Lymphocytes Relative 42 %      Monocytes Relative 7 %      Eosinophils Relative 2 %      Basophils Relative 0 %      Neutrophils Absolute 3.96 Thousands/µL      Immature Grans Absolute 0.01 Thousand/uL      Lymphocytes Absolute 3.40 Thousands/µL      Monocytes Absolute 0.52 Thousand/µL      Eosinophils Absolute 0.13 Thousand/µL      Basophils Absolute 0.03 Thousands/µL                  CTA head and neck with and without contrast    (Results Pending)              Procedures  ECG 12 Lead Documentation Only    Date/Time: 8/7/2023 6:49 PM    Performed by: Donn Hernandez MD Arun  Authorized by: Antonio Reddy MD    Indications / Diagnosis:  Facial numbness  ECG reviewed by me, the ED Provider: yes    Patient location:  ED  Interpretation:     Interpretation: normal    Rate:     ECG rate:  65 bpm    ECG rate assessment: normal    Rhythm:     Rhythm: sinus rhythm    Ectopy:     Ectopy: none    QRS:     QRS axis:  Normal    QRS intervals:  Normal  Conduction:     Conduction: normal    ST segments:     ST segments:  Normal  T waves:     T waves: normal               ED Course  ED Course as of 08/07/23 2129   Mon Aug 07, 2023   2115 Creatinine: 0.62   2116 WBC: 8.05                                             Medical Decision Making  The patient is comfortable appearing with stable vital signs. Exam is significant for decreased sensation to the left face. Unclear etiology of numbness. Peripheral neuropathy vs electrolyte disturbance vs CVA/TIA? Onset of symptoms on Friday, no indication for Stroke Alert. Will check EKG, basic labs, and CTA head/neck. Will continue to monitor in the ED. Disposition per workup and reassessment. 2130 Case signed out to Dr. Isrrael Salguero with CTA and reassessment pending. .. Left facial numbness: acute illness or injury  Amount and/or Complexity of Data Reviewed  Labs: ordered. Decision-making details documented in ED Course. Radiology: ordered. ECG/medicine tests: ordered and independent interpretation performed. Risk  Prescription drug management. Disposition  Final diagnoses:   Left facial numbness     Time reflects when diagnosis was documented in both MDM as applicable and the Disposition within this note     Time User Action Codes Description Comment    8/7/2023  9:16 PM Davy Dias Councilman Add [R20.0] Left facial numbness       ED Disposition     None      Follow-up Information    None         Patient's Medications   Discharge Prescriptions    No medications on file       No discharge procedures on file.     PDMP Review     None ED Provider  Electronically Signed by           Sarina Samuels MD  08/07/23 3757

## 2023-08-08 LAB
ATRIAL RATE: 65 BPM
P AXIS: -8 DEGREES
PR INTERVAL: 152 MS
QRS AXIS: 19 DEGREES
QRSD INTERVAL: 82 MS
QT INTERVAL: 374 MS
QTC INTERVAL: 388 MS
T WAVE AXIS: 39 DEGREES
VENTRICULAR RATE: 65 BPM

## 2023-08-08 PROCEDURE — 93010 ELECTROCARDIOGRAM REPORT: CPT | Performed by: INTERNAL MEDICINE

## 2023-11-09 ENCOUNTER — ANNUAL EXAM (OUTPATIENT)
Dept: OBGYN CLINIC | Facility: CLINIC | Age: 33
End: 2023-11-09

## 2023-11-09 VITALS
BODY MASS INDEX: 39.34 KG/M2 | DIASTOLIC BLOOD PRESSURE: 87 MMHG | SYSTOLIC BLOOD PRESSURE: 136 MMHG | HEIGHT: 66 IN | WEIGHT: 244.8 LBS | HEART RATE: 66 BPM

## 2023-11-09 DIAGNOSIS — Z01.419 ENCOUNTER FOR ANNUAL ROUTINE GYNECOLOGICAL EXAMINATION: Primary | ICD-10-CM

## 2023-11-09 PROCEDURE — S0612 ANNUAL GYNECOLOGICAL EXAMINA: HCPCS | Performed by: NURSE PRACTITIONER

## 2023-11-09 NOTE — PROGRESS NOTES
Annual Exam    Assessment   1. Encounter for annual routine gynecological examination          well woman       Plan       All questions answered. Breast self exam technique reviewed and patient encouraged to perform self-exam monthly. Contraception: none. Discussed healthy lifestyle modifications. Follow up in 1 year. Patient Instructions   Call with needs or concerns  Return in 1 year  Pt verbalized understanding of all discussed. Tyrone Siu is a 27 y.o.  female who presents for annual well woman exam. Periods are regular every 28-30 days, lasting 5 days. No intermenstrual bleeding, spotting, or discharge. Last WNL PAP and negative HPV was 2021  1 partner x 9 years, denoes domestic violence    Depression Screening Follow-up Plan: Patient's depression screening was negative with a PHQ-2 score of 0. Their PHQ-9 score was 0. Clinically patient does not have depression. No treatment is required. BMI Counseling: Body mass index is 39.51 kg/m². The BMI is above normal. Nutrition recommendations include reducing portion sizes, decreasing overall calorie intake, 3-5 servings of fruits/vegetables daily, reducing fast food intake, consuming healthier snacks, decreasing soda and/or juice intake, moderation in carbohydrate intake, and increasing intake of lean protein. Exercise recommendations include moderate aerobic physical activity for 150 minutes/week.         Current contraception: none, would accept a pregnancy if it occurred, raking Folic acid  History of abnormal Pap smear: no  Family history of uterine or ovarian cancer: no  Regular self breast exam: yes  History of abnormal mammogram: N/A  Family history of breast cancer: no  History of abnormal lipids: unknown  Menstrual History:  OB History          2    Para   1    Term   1       0    AB   0    Living   1         SAB   0    IAB   0    Ectopic   0    Multiple   0    Live Births   1 Menarche age: 4  Patient's last menstrual period was 11/01/2023 (exact date). The following portions of the patient's history were reviewed and updated as appropriate: allergies, current medications, past family history, past medical history, past social history, past surgical history and problem list.    Review of Systems  Pertinent items are noted in HPI.       Objective      /87   Pulse 66   Ht 5' 6" (1.676 m)   Wt 111 kg (244 lb 12.8 oz)   LMP 11/01/2023 (Exact Date)   Breastfeeding No   BMI 39.51 kg/m²     General: alert and oriented, in no acute distress, alert, appears stated age, and cooperative   Heart: NSR   Lungs: clear to auscultation bilaterally, WNL respiratory effort, negative cough or SOB   Thyroid: Negative masses palpable   Abdomen: soft, non-tender, without masses or organomegaly palpable   Vulva: normal   Vagina: normal mucosa   Cervix: no cervical motion tenderness and no lesions   Uterus: normal size, non-tender, normal shape and consistency   Adnexa: normal adnexa   Urethra: normal   Breasts: NT,negative masses, discharge, or dimpling

## 2023-12-21 ENCOUNTER — ULTRASOUND (OUTPATIENT)
Dept: OBGYN CLINIC | Facility: CLINIC | Age: 33
End: 2023-12-21

## 2023-12-21 ENCOUNTER — APPOINTMENT (OUTPATIENT)
Dept: LAB | Facility: CLINIC | Age: 33
End: 2023-12-21
Payer: COMMERCIAL

## 2023-12-21 VITALS
DIASTOLIC BLOOD PRESSURE: 74 MMHG | WEIGHT: 248.4 LBS | HEART RATE: 71 BPM | SYSTOLIC BLOOD PRESSURE: 110 MMHG | BODY MASS INDEX: 39.92 KG/M2 | HEIGHT: 66 IN

## 2023-12-21 DIAGNOSIS — N91.2 AMENORRHEA: ICD-10-CM

## 2023-12-21 DIAGNOSIS — N91.2 AMENORRHEA: Primary | ICD-10-CM

## 2023-12-21 LAB
ABO GROUP BLD: NORMAL
BASOPHILS # BLD AUTO: 0.02 THOUSANDS/ÂΜL (ref 0–0.1)
BASOPHILS NFR BLD AUTO: 0 % (ref 0–1)
BLD GP AB SCN SERPL QL: NEGATIVE
EOSINOPHIL # BLD AUTO: 0.11 THOUSAND/ÂΜL (ref 0–0.61)
EOSINOPHIL NFR BLD AUTO: 2 % (ref 0–6)
ERYTHROCYTE [DISTWIDTH] IN BLOOD BY AUTOMATED COUNT: 12 % (ref 11.6–15.1)
HCT VFR BLD AUTO: 35.8 % (ref 34.8–46.1)
HGB BLD-MCNC: 11.9 G/DL (ref 11.5–15.4)
IMM GRANULOCYTES # BLD AUTO: 0.03 THOUSAND/UL (ref 0–0.2)
IMM GRANULOCYTES NFR BLD AUTO: 0 % (ref 0–2)
LYMPHOCYTES # BLD AUTO: 3.05 THOUSANDS/ÂΜL (ref 0.6–4.47)
LYMPHOCYTES NFR BLD AUTO: 41 % (ref 14–44)
MCH RBC QN AUTO: 32.4 PG (ref 26.8–34.3)
MCHC RBC AUTO-ENTMCNC: 33.2 G/DL (ref 31.4–37.4)
MCV RBC AUTO: 98 FL (ref 82–98)
MONOCYTES # BLD AUTO: 0.45 THOUSAND/ÂΜL (ref 0.17–1.22)
MONOCYTES NFR BLD AUTO: 6 % (ref 4–12)
NEUTROPHILS # BLD AUTO: 3.87 THOUSANDS/ÂΜL (ref 1.85–7.62)
NEUTS SEG NFR BLD AUTO: 51 % (ref 43–75)
NRBC BLD AUTO-RTO: 0 /100 WBCS
PLATELET # BLD AUTO: 369 THOUSANDS/UL (ref 149–390)
PMV BLD AUTO: 10.3 FL (ref 8.9–12.7)
RBC # BLD AUTO: 3.67 MILLION/UL (ref 3.81–5.12)
RH BLD: POSITIVE
RUBV IGG SERPL IA-ACNC: 10.3 IU/ML
SL AMB POCT URINE HCG: ABNORMAL
SPECIMEN EXPIRATION DATE: NORMAL
WBC # BLD AUTO: 7.53 THOUSAND/UL (ref 4.31–10.16)

## 2023-12-21 PROCEDURE — 86901 BLOOD TYPING SEROLOGIC RH(D): CPT

## 2023-12-21 PROCEDURE — 86900 BLOOD TYPING SEROLOGIC ABO: CPT

## 2023-12-21 PROCEDURE — 87340 HEPATITIS B SURFACE AG IA: CPT

## 2023-12-21 PROCEDURE — 85025 COMPLETE CBC W/AUTO DIFF WBC: CPT

## 2023-12-21 PROCEDURE — 86762 RUBELLA ANTIBODY: CPT

## 2023-12-21 PROCEDURE — 36415 COLL VENOUS BLD VENIPUNCTURE: CPT

## 2023-12-21 PROCEDURE — 86850 RBC ANTIBODY SCREEN: CPT

## 2023-12-21 PROCEDURE — 86803 HEPATITIS C AB TEST: CPT

## 2023-12-21 PROCEDURE — 86706 HEP B SURFACE ANTIBODY: CPT

## 2023-12-21 PROCEDURE — 86780 TREPONEMA PALLIDUM: CPT

## 2023-12-21 PROCEDURE — 87389 HIV-1 AG W/HIV-1&-2 AB AG IA: CPT

## 2023-12-21 NOTE — PROGRESS NOTES
"S: 33 y.o.   who presents for viability scan with LMP of 23. She is 7 weeks and 1 days by her LMP. She reports occasional nausea. She denies cramping or vaginal bleeding. This is a planned and welcomed pregnancy. Her previous pregnancy was complicated by breech presentation in which she had a 1LTCS. She has expressed interest in TOLAC.     Her medical history is significant for seizure disorder well controlled on Keppra. She is taking a prenatal vitamin and additional folic acid supplementation.     Past Medical History:   Diagnosis Date    Obesity affecting pregnancy in third trimester     Seizures (HCC)        OB History    Para Term  AB Living   2 1 1 0 0 1   SAB IAB Ectopic Multiple Live Births   0 0 0 0 1      # Outcome Date GA Lbr Beto/2nd Weight Sex Delivery Anes PTL Lv   2             1 Term 16 39w0d  2863 g (6 lb 5 oz) F CS-LTranv Spinal N NADEEM        O:  Vitals:    23 1305   BP: 110/74   Pulse: 71   Weight: 113 kg (248 lb 6.4 oz)   Height: 5' 6\" (1.676 m)       TVUS: viable, carey IUP at 7 weeks 1 days with CRL 1.09 cm.  bpm. POLO 24. Final dating via LMP.                A/P:  #1. IUP at 7 weeks and 1 days  - Viable pregnancy on TVUS  - RTC in 4 weeks for H&P prenatal visit  - RTC in 1 week for nurse intake visit  - Labs and MFM referral placed    Problem List Items Addressed This Visit    None  Visit Diagnoses       Amenorrhea    -  Primary    Relevant Orders    POCT urine HCG    HIV 1/2 AG/AB w Reflex [facility] for 2 yr old and above    Hepatitis C antibody    UA (URINE) with reflex to Scope    Urine culture    Hepatitis B surface antigen    CBC and differential    Rubella antibody, IgG    Type and screen    RPR-Syphilis Screening (Total Syphilis IGG/IGM)    Hepatitis B surface antibody    Anemia Panel w/Reflex, OB    PRENATAL CARRIER PANEL (CFVANTAGE, FRAGILE X, SMA)           Yesica Miles MD  OB/GYN PGY-3  2023  1:13 PM  "

## 2023-12-22 LAB
HBV SURFACE AB SER-ACNC: 132 MIU/ML
HBV SURFACE AG SER QL: NORMAL
HCV AB SER QL: NORMAL
HIV 1+2 AB+HIV1 P24 AG SERPL QL IA: NORMAL
HIV 2 AB SERPL QL IA: NORMAL
HIV1 AB SERPL QL IA: NORMAL
HIV1 P24 AG SERPL QL IA: NORMAL
TREPONEMA PALLIDUM IGG+IGM AB [PRESENCE] IN SERUM OR PLASMA BY IMMUNOASSAY: NORMAL

## 2023-12-29 ENCOUNTER — TELEPHONE (OUTPATIENT)
Dept: OBGYN CLINIC | Facility: CLINIC | Age: 33
End: 2023-12-29

## 2023-12-29 NOTE — TELEPHONE ENCOUNTER
----- Message from Yesica Miles MD sent at 12/27/2023 10:46 AM EST -----  Can we call Ruth and let her know that her prenatal labs were normal, except she is non-immune to rubella. She will need MMR vaccine postpartum. Thank you.

## 2024-01-09 ENCOUNTER — INITIAL PRENATAL (OUTPATIENT)
Dept: OBGYN CLINIC | Facility: CLINIC | Age: 34
End: 2024-01-09

## 2024-01-09 VITALS
BODY MASS INDEX: 39.66 KG/M2 | HEART RATE: 75 BPM | DIASTOLIC BLOOD PRESSURE: 78 MMHG | SYSTOLIC BLOOD PRESSURE: 123 MMHG | WEIGHT: 246.8 LBS | HEIGHT: 66 IN

## 2024-01-09 DIAGNOSIS — Z3A.09 9 WEEKS GESTATION OF PREGNANCY: ICD-10-CM

## 2024-01-09 DIAGNOSIS — Z34.91 PRENATAL CARE IN FIRST TRIMESTER: Primary | ICD-10-CM

## 2024-01-09 PROCEDURE — OBC

## 2024-01-09 PROCEDURE — 90686 IIV4 VACC NO PRSV 0.5 ML IM: CPT

## 2024-01-09 PROCEDURE — 90471 IMMUNIZATION ADMIN: CPT

## 2024-01-09 NOTE — PROGRESS NOTES
"OBSTETRIC INTAKE VISIT    Ruth Self presents today for initial OB visit and intake at 9w6d. LMP - 23.  History obtained from patient and she reports it as follows:    Past Medical History:   Diagnosis Date    Obesity affecting pregnancy in third trimester     Seizures (HCC)      Past Surgical History:   Procedure Laterality Date    ANKLE SURGERY Right      SECTION      [place]  DELIVERY ONLY N/A 2016    Procedure:  SECTION ();  Surgeon: Jaylon Granados MD;  [facility]: [facility];  Service: Obstetrics     OB History    Para Term  AB Living   3 1 1 0 1 1   SAB IAB Ectopic Multiple Live Births   1 0 0 0 1      # Outcome Date GA Lbr Beto/2nd Weight Sex Delivery Anes PTL Lv   3 Current            2 SAB 2022           1 Term 16 39w0d  2863 g (6 lb 5 oz) F CS-LTranv Spinal N NADEEM     Social History     Tobacco Use    Smoking status: Never    Smokeless tobacco: Never   Vaping Use    Vaping status: Never Used   Substance Use Topics    Alcohol use: No    Drug use: Never       Current Outpatient Medications   Medication Instructions    folic acid (FOLVITE) 1 mg tablet TOME DOS TABLETAS POR V A ORAL TODOS LOS D AS    levETIRAcetam (KEPPRA) 250 mg, Oral, 2 times daily    Prenatal Vit-Fe Fumarate-FA (PRENATAL VITAMINS PO) Oral, Daily       No Known Allergies    Vitals: /78 (BP Location: Left arm, Patient Position: Sitting, Cuff Size: Standard)   Pulse 75   Ht 5' 6\" (1.676 m)   Wt 112 kg (246 lb 12.8 oz)   LMP 2023   BMI 39.83 kg/m²     Review of Systems:  Denies vaginal bleeding or leaking fluid.  Denies abdominal/pelvic pain or contractions.    Plan:  1. OB labwork ordered today to include Prenatal Panel, HCV antibody, Hgb fractionation cascade, Prenatal Carrier Screen Panel.  Other labs include Glucose 1H PG.  Advised patient to have drawn within the next few days.  2. Will help pt schedule ultrasound with MFM.  3. Return 24 for H&P " prenatal visit.  4. Referrals placed for WIC, , and [facility].  5. Given vaccines: Flu vaccine given.  6. Patient's depression screening was assessed with a PHQ-2 score of 0. Their PHQ-9 score was 0. Clinically patient does not have depression. No treatment is required.   in to see patient.  7. Reviewed the following educational topics with patient:   -routine prenatal visit/ultrasound/labwork schedule   -hospital for delivery and office phone/answering service contact information   -nutritional demands of pregnancy, healthy dietary habits   -listeria, toxoplasmosis, seafood precautions   -weight gain expectations (based on pre-pregnant BMI)   -exercise, rest, and sexual activity during pregnancy   -abstinence from alcohol, tobacco, and illegal drugs   -common discomforts of pregnancy and appropriate management   -OTC medications safe to use in pregnancy   -genetic screening options   -vaccines in pregnancy   -symptoms to report to OB provider    -signs of PTL and pre-eclampsia    -vaginal bleeding/leaking of fluid    -severe n/v-unable to tolerate ANY food/fluids for more than 24 hours

## 2024-01-09 NOTE — LETTER
"    [facility] REFERRAL      Date: 1/9/2024    Patient name: Ruth Self    YOB: 1990    Estimated Date of Delivery: 8/7/24      /78 (BP Location: Left arm, Patient Position: Sitting, Cuff Size: Standard)   Pulse 75   Ht 5' 6\" (1.676 m)   Wt 112 kg (246 lb 12.8 oz)   LMP 11/01/2023   BMI 39.83 kg/m²         Thank you,  PILY Shi            [facility] locations:   1. [facility]       [address] [place] [address]       Phone: [phone]       Fax: [phone]     2. [facility]       [address] [address]       Phone: [phone]       Fax: [phone]       "

## 2024-01-09 NOTE — PATIENT INSTRUCTIONS
SENALES MADAI EL EMBARAZO  Llame a nuestra oficina al [phone] para cualquiera de los siguientes:    1. sangrado vaginal  2. Dolor abdominal jai que no desaparece.  3. Fiebre (más de 100.4 y no se mazin con Tylenol)  4. Vómitos persistentes que kang más de 24 horas.  5. dolor de pecho  6. Dolor o ardor al orinar.  7. Dolor de deanna severo que no se resuelve con Tylenol  8. Visión borrosa o eli puntos en herndon visión.  9. Hinchazón repentina de herndon pancho o ad.  10. Enrojecimiento, hinchazón o dolor en shorty pierna.  11. Un aumento de peso repentino en pocos días.  12. Contar los movimientos fetales del bebé. (después de 28 semanas o el sexto mes de embarazo)  13. Shorty pérdida de líquido acuoso de la vagina: puede ser un chorro, un goteo o shorty humedad continua  14. Después de 20 semanas de embarazo, calambres rítmicos (más de 4 por hora) o menstruales joon dolor bajo / pélvico

## 2024-01-09 NOTE — LETTER
1/9/2024      This letter is to confirm that Ruth Self is pregnant and is under our care.  Her Estimated Date of Delivery: 8/7/24.    If you have any questions or concerns, please contact our office.  Thank you,    PILY Shi

## 2024-01-10 ENCOUNTER — TELEPHONE (OUTPATIENT)
Facility: HOSPITAL | Age: 34
End: 2024-01-10

## 2024-01-10 ENCOUNTER — PATIENT OUTREACH (OUTPATIENT)
Dept: OBGYN CLINIC | Facility: CLINIC | Age: 34
End: 2024-01-10

## 2024-01-10 NOTE — TELEPHONE ENCOUNTER
"RN placed pt on legal hold at 1940 as he continues to state \"I want to die, I want to kill myself.\"  Sitter outside room in full view of pt.    " Called patient to schedule MFM appointment, based on referral issued to Maternal Fetal Medicine by OB office.      Left voicemail using  (ID [id number]) requesting patient to call back and schedule appointment, with office number for return call [phone].

## 2024-01-10 NOTE — PROGRESS NOTES
MERRY NOVOA was referred by PILY Reyes to complete a PN SW assessment in the first trimster. Pt is currently . She is 19l1wKP, her POLO is 2024. The pt is [place] speaking and MERRY NOVOA utilized the [employer] interpretor services to complete the referral over the phone today.     Pt reports this pregnancy is planned and welcomed. Pt lives at home with her 8yo daughter and her . Pt reports her family is excited and welcoming of the pregnancy. Pt is currently employed in a [employer], he works in a chocolate factory. Has insurance through her employer, has her WIC appointment on  to see if she qualifies. Pt has no financial concerns. She drives herself.     Pt denies any CYS, legal, IPV, MH or D&A history or concerns.     MERRY Novoa encouraged the patient to outreach as needed but will otherwise close this referral today as there were no needs identified.    This note took several days to complete as pt was not available for the entire assessmenton the first call.

## 2024-01-23 ENCOUNTER — INITIAL PRENATAL (OUTPATIENT)
Dept: OBGYN CLINIC | Facility: CLINIC | Age: 34
End: 2024-01-23

## 2024-01-23 ENCOUNTER — APPOINTMENT (OUTPATIENT)
Dept: LAB | Facility: CLINIC | Age: 34
End: 2024-01-23
Payer: COMMERCIAL

## 2024-01-23 VITALS
SYSTOLIC BLOOD PRESSURE: 134 MMHG | HEART RATE: 80 BPM | DIASTOLIC BLOOD PRESSURE: 71 MMHG | WEIGHT: 239.2 LBS | BODY MASS INDEX: 38.61 KG/M2

## 2024-01-23 DIAGNOSIS — G40.909 SEIZURE DISORDER DURING PREGNANCY, ANTEPARTUM (HCC): ICD-10-CM

## 2024-01-23 DIAGNOSIS — Z3A.11 11 WEEKS GESTATION OF PREGNANCY: ICD-10-CM

## 2024-01-23 DIAGNOSIS — O99.350 SEIZURE DISORDER DURING PREGNANCY, ANTEPARTUM (HCC): ICD-10-CM

## 2024-01-23 DIAGNOSIS — Z34.91 PRENATAL CARE IN FIRST TRIMESTER: ICD-10-CM

## 2024-01-23 DIAGNOSIS — Z28.39 RUBELLA NON-IMMUNE STATUS, ANTEPARTUM: ICD-10-CM

## 2024-01-23 DIAGNOSIS — O09.899 RUBELLA NON-IMMUNE STATUS, ANTEPARTUM: ICD-10-CM

## 2024-01-23 DIAGNOSIS — Z3A.09 9 WEEKS GESTATION OF PREGNANCY: ICD-10-CM

## 2024-01-23 DIAGNOSIS — O99.210 OBESITY IN PREGNANCY, ANTEPARTUM: ICD-10-CM

## 2024-01-23 DIAGNOSIS — O09.529 ANTEPARTUM MULTIGRAVIDA OF ADVANCED MATERNAL AGE: Primary | ICD-10-CM

## 2024-01-23 DIAGNOSIS — O34.219 HISTORY OF CESAREAN DELIVERY, ANTEPARTUM: ICD-10-CM

## 2024-01-23 LAB
BACTERIA UR QL AUTO: ABNORMAL /HPF
BILIRUB UR QL STRIP: NEGATIVE
CLARITY UR: CLEAR
COLOR UR: YELLOW
GLUCOSE 1H P 50 G GLC PO SERPL-MCNC: 125 MG/DL (ref 40–134)
GLUCOSE UR STRIP-MCNC: NEGATIVE MG/DL
HGB UR QL STRIP.AUTO: NEGATIVE
KETONES UR STRIP-MCNC: NEGATIVE MG/DL
LEUKOCYTE ESTERASE UR QL STRIP: NEGATIVE
MUCOUS THREADS UR QL AUTO: ABNORMAL
NITRITE UR QL STRIP: NEGATIVE
NON-SQ EPI CELLS URNS QL MICRO: ABNORMAL /HPF
PH UR STRIP.AUTO: 6.5 [PH]
PROT UR STRIP-MCNC: ABNORMAL MG/DL
RBC #/AREA URNS AUTO: ABNORMAL /HPF
SP GR UR STRIP.AUTO: 1.02 (ref 1–1.03)
TRANS CELLS #/AREA URNS HPF: PRESENT /[HPF]
UROBILINOGEN UR STRIP-ACNC: <2 MG/DL
WBC #/AREA URNS AUTO: ABNORMAL /HPF

## 2024-01-23 PROCEDURE — 36415 COLL VENOUS BLD VENIPUNCTURE: CPT

## 2024-01-23 PROCEDURE — 83020 HEMOGLOBIN ELECTROPHORESIS: CPT

## 2024-01-23 PROCEDURE — 87086 URINE CULTURE/COLONY COUNT: CPT

## 2024-01-23 PROCEDURE — 87591 N.GONORRHOEAE DNA AMP PROB: CPT | Performed by: OBSTETRICS & GYNECOLOGY

## 2024-01-23 PROCEDURE — 87491 CHLMYD TRACH DNA AMP PROBE: CPT | Performed by: OBSTETRICS & GYNECOLOGY

## 2024-01-23 PROCEDURE — PNV: Performed by: OBSTETRICS & GYNECOLOGY

## 2024-01-23 PROCEDURE — 82950 GLUCOSE TEST: CPT

## 2024-01-23 NOTE — PROGRESS NOTES
[facility] ObGyn  Prenatal H&P Visit    Problem List Items Addressed This Visit       Rubella non-immune status, antepartum     Will need MMR postpartum          Obesity in pregnancy, antepartum     Recommended Weight Gain in Pregnancy:  If you have Class II obesity (BMI 35-39.9) or Class III obesity (BMI >40), there are limited data for appropriate weight gain guidelines. For an obese pregnant woman who is gaining less weight than recommended but has an appropriately growing fetus, no evidence exists that encouraging increased weight gain to conform with these guidelines will improve maternal or fetal outcomes. (Per ACOG : Weight Gain During Pregnancy)           History of  delivery, antepartum     History of CS for breech presentation          Seizure disorder during pregnancy, antepartum (HCC)     Currently on Keppra   Follows with Neurology at [facility]   Last seizure 2 years ago   Patient planning to call her neurologist about pregnancy   Currently taking Folate          Advanced maternal age in multigravida - Primary    11 weeks gestation of pregnancy     Overview:  Labs  Pap smear 2021, NILM   HIV, RPR, GC/CT, Hep C: negative   Rubella: non-immune  Hep B: immune  Starting Hgb/Plt: 11.9/369  All other prenatal labs WNL   NIPT/MSAFP [  ]   28w labs order closer to 28w visit   GBS to be completed around 36w    Vaccines:  Flu vaccine: given   Tdap vaccine: offer around 28w   Contraception: [  ]   Delivery consent: to be signed at 28w  Ultrasounds:   First trimester: scheduled for   Level II: [  ]             Other Visit Diagnoses       Prenatal care in first trimester        Relevant Orders    Chlamydia/GC amplified DNA by PCR (Completed)            Korina Bailey MD   OBGYN PGY-4       History of Present Illness   Ruth Self is a 33 y.o.  who presents for prenatal H&P visit. Final dating by LMP. Symptoms today: occasional nausea.     Obstetric History  History of   delivery secondary to breech presentation - declined ECV   Denies h/o HTN or DM or anemia   No family history of inheritable diseases or birth defects     GYN History   Last pap  NILM     Medical History   Epilepsy: currently following with a neurologist. They are not aware she is pregnant. Encouraged her to call and make an appt with her neurologist. She may need medication adjustments in pregnancy. Has not had a seizure in 2 years.     Surgical History:    Delivery     Social History:   Patient is currently employed at [employer].   She lives at home with her spouse and child   She has good support from family and friends.   She is here today with her    She denies a history of cigarette smoking, chewing, vaping, alcohol, THC, Methamphetamines, heroin or there illicit substances.     Review of Systems   Constitutional: Negative.    Respiratory: Negative.     Gastrointestinal:  Positive for nausea. Negative for abdominal pain and vomiting.   Genitourinary:  Negative for vaginal bleeding, vaginal discharge and vaginal pain.   Musculoskeletal: Negative.    Neurological: Negative.    Psychiatric/Behavioral: Negative.         Historical Information     Past Medical History:   Diagnosis Date    Obesity affecting pregnancy in third trimester     Seizures (HCC)        Past Surgical History:   Procedure Laterality Date    ANKLE SURGERY Right      SECTION      [place]  DELIVERY ONLY N/A 2016    Procedure:  SECTION ();  Surgeon: Jaylon Granados MD;  Location: [facility];  Service: Obstetrics       Family History   Problem Relation Age of Onset    Diabetes Father     Cancer Neg Hx     Ovarian cancer Neg Hx     Colon cancer Neg Hx     Breast cancer Neg Hx        Social History     Socioeconomic History    Marital status: /Civil Union     Spouse name: Not on file    Number of children: Not on file    Years of education: Not on file    Highest education  level: Not on file   Occupational History    Not on file   Tobacco Use    Smoking status: Never    Smokeless tobacco: Never   Vaping Use    Vaping status: Never Used   Substance and Sexual Activity    Alcohol use: No    Drug use: Never    Sexual activity: Yes     Partners: Male     Birth control/protection: None   Other Topics Concern    Not on file   Social History Narrative    Not on file     Social Determinants of Health     Financial Resource Strain: Low Risk  (1/9/2024)    Overall Financial Resource Strain (CARDIA)     Difficulty of Paying Living Expenses: Not hard at all   Food Insecurity: No Food Insecurity (1/9/2024)    Hunger Vital Sign     Worried About Running Out of Food in the Last Year: Never true     Ran Out of Food in the Last Year: Never true   Transportation Needs: No Transportation Needs (1/9/2024)    PRAPARE - Transportation     Lack of Transportation (Medical): No     Lack of Transportation (Non-Medical): No   Physical Activity: Not on file   Stress: No Stress Concern Present (11/9/2021)    [place] - Occupational Stress Questionnaire     Feeling of Stress : Not at all   Social Connections: Not on file   Intimate Partner Violence: Not on file   Housing Stability: Low Risk  (8/30/2022)    Housing Stability Vital Sign     Unable to Pay for Housing in the Last Year: No     Number of Places Lived in the Last Year: 1     Unstable Housing in the Last Year: No       Meds/Allergies   No Known Allergies    Objective   Vitals:    01/23/24 0836   BP: 134/71   Pulse: 80       Physical Exam   Physical Exam  Constitutional:       Appearance: Normal appearance.   Genitourinary:      Genitourinary Comments: Normal external female genitalia   No lesions or skin changes noted on the vulva, perineum or perianal region   On speculum exam, there vaginal mucosa is well estrogenized.No abnormal discharge, lesions or bleeding   Cervix is visualized and grossly normal in appearance. No  "bleeding or abnormal discharge noted.   GC/CT collected      HENT:      Head: Normocephalic and atraumatic.   Cardiovascular:      Rate and Rhythm: Normal rate.      Pulses: Normal pulses.   Pulmonary:      Effort: Pulmonary effort is normal.      Breath sounds: No wheezing, rhonchi or rales.   Abdominal:      Tenderness: There is no abdominal tenderness. There is no guarding or rebound.   Musculoskeletal:      Cervical back: Normal range of motion.   Neurological:      General: No focal deficit present.      Mental Status: She is alert. Mental status is at baseline.   Psychiatric:         Mood and Affect: Mood normal.          by KATIE       Prenatal Labs: I have personally reviewed pertinent reports.  , Blood Type:   Lab Results   Component Value Date/Time    ABO Grouping O 12/21/2023 01:52 PM     , D (Rh type):   Lab Results   Component Value Date/Time    Rh Factor Positive 12/21/2023 01:52 PM     , Antibody Screen: No results found for: \"ANTIBODYSCR\" , HCT/HGB:   Lab Results   Component Value Date/Time    Hematocrit 35.8 12/21/2023 01:52 PM    Hematocrit 37.5 05/10/2018 03:04 AM    Hemoglobin 11.9 12/21/2023 01:52 PM    Hemoglobin 12.9 05/10/2018 03:04 AM      , MCV:   Lab Results   Component Value Date/Time    MCV 98 12/21/2023 01:52 PM    MCV 91 05/10/2018 03:04 AM      , Platelets:   Lab Results   Component Value Date/Time    Platelets 369 12/21/2023 01:52 PM    Platelets 339 05/10/2018 03:04 AM      , 1 hour Glucola:   Lab Results   Component Value Date/Time    Glucose 125 01/23/2024 10:17 AM   , Rubella:   Lab Results   Component Value Date/Time    Rubella IgG Quant 10.3 (L) 12/21/2023 01:52 PM        , Urine Culture/Screen:   Lab Results   Component Value Date/Time    Urine Culture No Growth <1000 cfu/mL 01/13/2016 11:02 AM       , Hep B:   Lab Results   Component Value Date/Time    Hepatitis B Surface Ag Non-reactive 12/21/2023 01:52 PM     , Hep C: No components found for: \"HEPCSAG\", \"EXTHEPCSAG\"   " , HIV:   Lab Results   Component Value Date/Time    HIV-1/HIV-2 Ab Non-reactive 01/13/2016 11:02 AM

## 2024-01-23 NOTE — ASSESSMENT & PLAN NOTE
Currently on Keppra   Follows with Neurology at [facility]   Last seizure 2 years ago   Patient planning to call her neurologist about pregnancy   Currently taking Folate

## 2024-01-24 PROBLEM — Z3A.11 11 WEEKS GESTATION OF PREGNANCY: Status: ACTIVE | Noted: 2024-01-24

## 2024-01-24 LAB
BACTERIA UR CULT: NORMAL
C TRACH DNA SPEC QL NAA+PROBE: NEGATIVE
N GONORRHOEA DNA SPEC QL NAA+PROBE: NEGATIVE

## 2024-01-24 NOTE — ASSESSMENT & PLAN NOTE
Overview:  Labs  Pap smear 11/09/2021, NILM   HIV, RPR, GC/CT, Hep C: negative   Rubella: non-immune  Hep B: immune  Starting Hgb/Plt: 11.9/369  All other prenatal labs WNL   NIPT/MSAFP [  ]   28w labs order closer to 28w visit   GBS to be completed around 36w    Vaccines:  Flu vaccine: given   Tdap vaccine: offer around 28w   Contraception: [  ]   Delivery consent: to be signed at 28w  Ultrasounds:   First trimester: scheduled for 2/1  Level II: [  ]

## 2024-01-24 NOTE — ASSESSMENT & PLAN NOTE
Recommended Weight Gain in Pregnancy:  If you have Class II obesity (BMI 35-39.9) or Class III obesity (BMI >40), there are limited data for appropriate weight gain guidelines. For an obese pregnant woman who is gaining less weight than recommended but has an appropriately growing fetus, no evidence exists that encouraging increased weight gain to conform with these guidelines will improve maternal or fetal outcomes. (Per ACOG : Weight Gain During Pregnancy)

## 2024-01-26 LAB
HGB A MFR BLD: 2.8 % (ref 1.8–3.2)
HGB A MFR BLD: 97.2 % (ref 96.4–98.8)
HGB F MFR BLD: 0 % (ref 0–2)
HGB FRACT BLD-IMP: NORMAL
HGB S MFR BLD: 0 %

## 2024-02-01 ENCOUNTER — ROUTINE PRENATAL (OUTPATIENT)
Age: 34
End: 2024-02-01
Payer: COMMERCIAL

## 2024-02-01 VITALS
HEIGHT: 66 IN | WEIGHT: 242.4 LBS | BODY MASS INDEX: 38.96 KG/M2 | DIASTOLIC BLOOD PRESSURE: 68 MMHG | HEART RATE: 73 BPM | SYSTOLIC BLOOD PRESSURE: 120 MMHG

## 2024-02-01 DIAGNOSIS — Z3A.13 13 WEEKS GESTATION OF PREGNANCY: ICD-10-CM

## 2024-02-01 DIAGNOSIS — Z36.82 ENCOUNTER FOR (NT) NUCHAL TRANSLUCENCY SCAN: ICD-10-CM

## 2024-02-01 DIAGNOSIS — O34.219 HISTORY OF CESAREAN DELIVERY, ANTEPARTUM: ICD-10-CM

## 2024-02-01 DIAGNOSIS — Z34.91 PRENATAL CARE IN FIRST TRIMESTER: ICD-10-CM

## 2024-02-01 DIAGNOSIS — G40.909 SEIZURE DISORDER DURING PREGNANCY, ANTEPARTUM (HCC): ICD-10-CM

## 2024-02-01 DIAGNOSIS — O99.350 SEIZURE DISORDER DURING PREGNANCY, ANTEPARTUM (HCC): ICD-10-CM

## 2024-02-01 DIAGNOSIS — Z3A.09 9 WEEKS GESTATION OF PREGNANCY: ICD-10-CM

## 2024-02-01 DIAGNOSIS — O99.210 OBESITY IN PREGNANCY, ANTEPARTUM: ICD-10-CM

## 2024-02-01 DIAGNOSIS — O09.529 ANTEPARTUM MULTIGRAVIDA OF ADVANCED MATERNAL AGE: Primary | ICD-10-CM

## 2024-02-01 PROBLEM — Z31.9 PATIENT DESIRES PREGNANCY: Status: RESOLVED | Noted: 2020-09-22 | Resolved: 2024-02-01

## 2024-02-01 PROCEDURE — 76801 OB US < 14 WKS SINGLE FETUS: CPT | Performed by: STUDENT IN AN ORGANIZED HEALTH CARE EDUCATION/TRAINING PROGRAM

## 2024-02-01 PROCEDURE — 76813 OB US NUCHAL MEAS 1 GEST: CPT | Performed by: STUDENT IN AN ORGANIZED HEALTH CARE EDUCATION/TRAINING PROGRAM

## 2024-02-01 PROCEDURE — 99244 OFF/OP CNSLTJ NEW/EST MOD 40: CPT | Performed by: STUDENT IN AN ORGANIZED HEALTH CARE EDUCATION/TRAINING PROGRAM

## 2024-02-01 RX ORDER — ASPIRIN 81 MG/1
162 TABLET ORAL DAILY
Qty: 180 TABLET | Refills: 1 | Status: SHIPPED | OUTPATIENT
Start: 2024-02-01 | End: 2024-05-31

## 2024-02-01 NOTE — PROGRESS NOTES
Ultrasound Probe Disinfection    A transvaginal ultrasound was performed.   Prior to use, disinfection was performed with High Level Disinfection Process ([employer]).  Probe serial number S1: [id number] was used.      Gabrielle Henderson  02/01/24  10:57 AM

## 2024-02-01 NOTE — PROGRESS NOTES
"[facility]: Ms. Self was seen today for nuchal translucency ultrasound.  See ultrasound report under \"OB Procedures\" tab.        Physical Exam  Constitutional:       General: She is not in acute distress.     Appearance: Normal appearance.   HENT:      Head: Normocephalic and atraumatic.   Eyes:      Extraocular Movements: Extraocular movements intact.   Cardiovascular:      Rate and Rhythm: Normal rate.   Pulmonary:      Effort: Pulmonary effort is normal. No respiratory distress.   Skin:     Findings: No erythema or rash.   Neurological:      Mental Status: She is alert and oriented to person, place, and time.   Psychiatric:         Mood and Affect: Mood normal.         Behavior: Behavior normal.         Please don't hesitate to contact our office with any concerns or questions.  -Isa lAexandre MD      "

## 2024-02-01 NOTE — LETTER
"2024     PILY Reyes  [address] [place] [address]    Patient: Ruth Self   YOB: 1990   Date of Visit: 2024       Dear Dr. Romero:    Thank you for referring Ruth Self to me for evaluation. Below are my notes for this consultation.    If you have questions, please do not hesitate to call me. I look forward to following your patient along with you.         Sincerely,        Isa Alexandre MD        CC: No Recipients    Isa Alexandre MD  2024 11:44 AM  Sign when Signing Visit  [facility]: Ms. Self was seen today for nuchal translucency ultrasound.  See ultrasound report under \"OB Procedures\" tab.        Physical Exam  Constitutional:       General: She is not in acute distress.     Appearance: Normal appearance.   HENT:      Head: Normocephalic and atraumatic.   Eyes:      Extraocular Movements: Extraocular movements intact.   Cardiovascular:      Rate and Rhythm: Normal rate.   Pulmonary:      Effort: Pulmonary effort is normal. No respiratory distress.   Skin:     Findings: No erythema or rash.   Neurological:      Mental Status: She is alert and oriented to person, place, and time.   Psychiatric:         Mood and Affect: Mood normal.         Behavior: Behavior normal.         Please don't hesitate to contact our office with any concerns or questions.  -Isa Alexandre MD         "

## 2024-02-02 ENCOUNTER — DOCUMENTATION (OUTPATIENT)
Dept: PERINATAL CARE | Facility: OTHER | Age: 34
End: 2024-02-02

## 2024-02-02 NOTE — PROGRESS NOTES
As per Star Marvin no precert/authorization is needed for CPT code 47544 and Diagnosis code O09.521. It is considered an Independent test/Lab. (Call ref#[id number] and [id number] - [place] in members benefits)    As per [place] Patient has a deductible that may be applied to this testing.   Called patient and left a voice message for her to call office regarding testing and insurance.

## 2024-02-06 ENCOUNTER — CLINICAL SUPPORT (OUTPATIENT)
Age: 34
End: 2024-02-06
Payer: COMMERCIAL

## 2024-02-06 DIAGNOSIS — Z3A.13 13 WEEKS GESTATION OF PREGNANCY: Primary | ICD-10-CM

## 2024-02-06 PROCEDURE — 36415 COLL VENOUS BLD VENIPUNCTURE: CPT

## 2024-02-06 NOTE — PROGRESS NOTES
Patient chose to have [facility] [id number] Non-Invasive Prenatal Screen with fetal sex.   Patient choose billed through insurance.     Patient given brochure and is aware [facility] will contact patient's insurance and coordinate coverage.  Provided [facility] contact information. General inquiries [phone], Cost estimates [phone] and [facility] Billing [phone]. Website [email or web address].     Blood collection tubes labeled with patient identifiers (name, medical record number, and date of birth).     Filled out [facility] order form. Patient chose to have blood drawn in our Maternal Fetal Medicine office. Blood work drawn by MELISSA Khan RN. .   If patient had blood work in office: Blood drawn left arm and with a butterfly needle.   If patient chose to have blood work drawn at a [facility] lab we requested patient notify MFM (via phone call or [employer] message) when blood collected so office can follow up on results.     Copy of lab order scanned to Epic media.     Maternal Fetal Medicine will have results in approximately 5-7 business days and will call patient or notify via [employer].  Patient aware viewing lab result online will reveal fetal sex if ordered.    Patient verbalized understanding of all instructions and no questions at this time.

## 2024-02-08 LAB
CFDNA.FET/CFDNA.TOTAL SFR FETUS: NORMAL %
CFDNA.FET/CFDNA.TOTAL SFR FETUS: NORMAL %
CITATION REF LAB TEST: NORMAL
FET 13+18+21+X+Y ANEUP PLAS.CFDNA: NORMAL
FET CHR 21 TS PLAS.CFDNA QL: NORMAL
FET CHR 21 TS PLAS.CFDNA QL: NORMAL
FET MS X RISK WBC.DNA+CFDNA QL: NORMAL
FET SEX PLAS.CFDNA DOSAGE CFDNA: NORMAL
FET TS 13 RISK PLAS.CFDNA QL: NORMAL
FET X + Y ANEUP RISK PLAS.CFDNA SEQ-IMP: NORMAL
GESTATION: NORMAL
GESTATIONAL AGE > 9:: NORMAL
LAB DIRECTOR NAME PROVIDER: NORMAL
LABORATORY COMMENT REPORT: NORMAL
LIMITATIONS OF THE TEST: NORMAL
NEGATIVE PREDICTIVE VALUE: NORMAL
PERFORMANCE CHARACTERISTICS: NORMAL
POSITIVE PREDICTIVE VALUE: NORMAL
REF LAB TEST METHOD: NORMAL
SL AMB NOTE:: NORMAL
TEST PERFORMANCE INFO SPEC: NORMAL

## 2024-02-10 PROBLEM — O99.210 OBESITY IN PREGNANCY: Status: RESOLVED | Noted: 2024-02-10 | Resolved: 2024-02-10

## 2024-02-10 PROBLEM — O99.210 OBESITY IN PREGNANCY: Status: ACTIVE | Noted: 2024-02-10

## 2024-02-16 ENCOUNTER — TELEPHONE (OUTPATIENT)
Dept: PERINATAL CARE | Facility: CLINIC | Age: 34
End: 2024-02-16

## 2024-02-16 NOTE — TELEPHONE ENCOUNTER
----- Message from Aurelio Leone MD sent at 2/16/2024  9:59 AM EST -----  I have reviewed the patient's lab results which are normal.  Please contact the patient to inform her of the normal results, unless Ms. Self has already reviewed the results using [employer].      Aurelio Leone

## 2024-02-16 NOTE — TELEPHONE ENCOUNTER
Unable to reach Ruth at the phone number provided [phone].  Called Ruth's , Power Barker at [phone] confirmed with communication consent form to provided the following information with [employer]  ID [id number]:      Spoke with Ruth Self's  Power Barker, provided him with results of her [id number] NIPS test, fetal sex  not provided. Was instructed on MSAFP being ordered by OB and timing of test. Informed Power that Dr. Leone wrote Ruth a [employer] message in regards of her [id number] results as well.    Agnieszka was receptive to information and declines further questions at this time. Instructed Power to contact the nurse line with any questions @ [phone].

## 2024-02-19 DIAGNOSIS — Z13.79 GENETIC SCREENING: Primary | ICD-10-CM

## 2024-02-20 ENCOUNTER — ROUTINE PRENATAL (OUTPATIENT)
Dept: OBGYN CLINIC | Facility: CLINIC | Age: 34
End: 2024-02-20

## 2024-02-20 ENCOUNTER — APPOINTMENT (OUTPATIENT)
Dept: LAB | Facility: CLINIC | Age: 34
End: 2024-02-20
Payer: COMMERCIAL

## 2024-02-20 VITALS
SYSTOLIC BLOOD PRESSURE: 128 MMHG | WEIGHT: 241 LBS | BODY MASS INDEX: 38.73 KG/M2 | DIASTOLIC BLOOD PRESSURE: 62 MMHG | HEART RATE: 81 BPM | HEIGHT: 66 IN

## 2024-02-20 DIAGNOSIS — O99.350 SEIZURE DISORDER DURING PREGNANCY, ANTEPARTUM (HCC): ICD-10-CM

## 2024-02-20 DIAGNOSIS — Z34.92 PRENATAL CARE IN SECOND TRIMESTER: Primary | ICD-10-CM

## 2024-02-20 DIAGNOSIS — Z3A.15 15 WEEKS GESTATION OF PREGNANCY: ICD-10-CM

## 2024-02-20 DIAGNOSIS — Z13.79 GENETIC SCREENING: ICD-10-CM

## 2024-02-20 DIAGNOSIS — G40.909 SEIZURE DISORDER DURING PREGNANCY, ANTEPARTUM (HCC): ICD-10-CM

## 2024-02-20 PROCEDURE — PNV: Performed by: NURSE PRACTITIONER

## 2024-02-20 PROCEDURE — 82105 ALPHA-FETOPROTEIN SERUM: CPT

## 2024-02-20 PROCEDURE — 36415 COLL VENOUS BLD VENIPUNCTURE: CPT

## 2024-02-20 NOTE — PATIENT INSTRUCTIONS
SENALES MADAI EL EMBARAZO  Llame a nuestra oficina al [phone] para cualquiera de los siguientes:    1. sangrado vaginal  2. Dolor abdominal jai que no desaparece.  3. Fiebre (más de 100.4 y no se mazin con Tylenol)  4. Vómitos persistentes que kang más de 24 horas.  5. dolor de pecho  6. Dolor o ardor al orinar.  7. Dolor de deanna severo que no se resuelve con Tylenol  8. Visión borrosa o eli puntos en herndon visión.  9. Hinchazón repentina de herndon pancho o ad.  10. Enrojecimiento, hinchazón o dolor en shorty pierna.  11. Un aumento de peso repentino en pocos días.  12. Contar los movimientos fetales del bebé. (después de 28 semanas o el sexto mes de embarazo)  13. Shorty pérdida de líquido acuoso de la vagina: puede ser un chorro, un goteo o shorty humedad continua  14. Después de 20 semanas de embarazo, calambres rítmicos (más de 4 por hora) o menstruales joon dolor bajo / pélvico     Complete second part of genetic screening  Keep appointment for Level II U.S  Return in 4 weeks

## 2024-02-20 NOTE — PROGRESS NOTES
"Assessment & Plan  33 y.o.  at 15w6d presenting for routine prenatal visit.   Advised to complete AFP between 16-18 weeks  Taking Keppra as directed, pt states she has a neurology appointment next month  Pt has appointmeny for Level Ii U/S    Problem List Items Addressed This Visit          Nervous and Auditory    Seizure disorder during pregnancy, antepartum (HCC)     Other Visit Diagnoses       Prenatal care in second trimester    -  Primary    15 weeks gestation of pregnancy              ____________________________________________________________  Subjective  She is without complaint.   She denies contractions, loss of fluid, or vaginal bleeding.   She feels regular fetal movements.     WNL respiratory effort, negative cough or SOB    Objective  /62 (BP Location: Right arm, Patient Position: Sitting, Cuff Size: Standard)   Pulse 81   Ht 5' 6\" (1.676 m)   Wt 109 kg (241 lb)   LMP 2023   BMI 38.90 kg/m²          Patient's Active Problem List  Patient Active Problem List   Diagnosis    Obesity, Class III, BMI 40-49.9 (morbid obesity) ([facility])    Rubella non-immune status, antepartum    IUD check up    Pelvic pain    Encounter for IUD removal    Obesity in pregnancy, antepartum    History of  delivery, antepartum    Seizure disorder during pregnancy, antepartum (HCC)    Advanced maternal age in multigravida    11 weeks gestation of pregnancy    Genetic screening     Plan  Complete second part of genetic testing  Keep appointment for Level Ii U/STo call with vaginal bleeding, loss of fluid, regular contractions, other needs or concerns.  Return in 4 weeks  Pt verbalized understanding of all discussed.      "

## 2024-02-23 LAB
2ND TRIMESTER 4 SCREEN SERPL-IMP: NORMAL
AFP ADJ MOM SERPL: 1.43
AFP INTERP AMN-IMP: NORMAL
AFP INTERP SERPL-IMP: NORMAL
AFP INTERP SERPL-IMP: NORMAL
AFP SERPL-MCNC: 35.8 NG/ML
AGE AT DELIVERY: 35.7 YR
GA METHOD: NORMAL
GA: 15.9 WEEKS
IDDM PATIENT QL: NO
MULTIPLE PREGNANCY: NO
NEURAL TUBE DEFECT RISK FETUS: 3311 %

## 2024-03-15 PROBLEM — Z3A.19 19 WEEKS GESTATION OF PREGNANCY: Status: ACTIVE | Noted: 2024-01-24

## 2024-03-15 NOTE — PROGRESS NOTES
[facility] WOM[facility]'S HE[facility]   PRENATAL VISIT  Ruth Self  [id number]  1990        ASSESSMENT/PLAN:  Problem List       Rubella non-immune status, antepartum    Overview     Will need MMR postpartum          Pelvic pain (Chronic)    Overview     Follow-up urinalysis/urine culture ordered on 3/19/24         Obesity in pregnancy, antepartum    Overview     - fetal surveillence to start at 37 weeks gestation [ ]  -Early 1hr gtt wnl           History of  delivery, antepartum    Overview     History of CS for breech presentation          Seizure disorder during pregnancy, antepartum (HCC)    Overview     Currently on Keppra   Follows with Neurology at [facility]   Last seizure 2 years ago   Patient has appointment with [facility] on 24  Currently taking Folate   Keppra level ordered 3/19/24         Advanced maternal age in multigravida    19 weeks gestation of pregnancy    Overview     Overview:  Labs  Pap smear 2021, NILM   HIV, RPR, GC/CT, Hep C: negative   Rubella: non-immune  Hep B: immune  Starting Hgb/Plt: 11.9/369  All other prenatal labs WNL   NIPT/MSAFP - low risk, negative screen  28w labs order closer to 28w visit   GBS to be completed around 36w    Vaccines:  Flu vaccine: given   Tdap vaccine: offer around 28w   Contraception: POPs   Delivery plan: TOLAC (1st CS was for breech)  Delivery consent: to be signed at 28w  Ultrasounds:   First trimester: normal NT  Level II: 3/26/24              Subjective: 33 y.o.  19w2d here for prenatal visit. She denies contractions. She denies leakage of fluid and vaginal bleeding. She endorses good fetal movement. Discussed that urinalysis and urine culture will be ordered for complaint of pelvic pressure.    Discussed that patient should have Keppra level collected and try to move up her neurology appointment. She will mickey her neurologist.    We discussed the risks, benefits, alternatives of TOLAC. The patient understands the risks of  TOLAC and the risk of uterine rupture to be less than 1% when in spontaneous labor. She understands that risk increases slightly with induced labor. She understands that uterine rupture can represent a fetal and/or maternal emergency with risks including hemorrhage, emergency  section and associated risks of infection and damage to surrounding organs and tissues, need for further surgery, including hysterectomy, need for blood transfusion, fetal or maternal death and complications that cannot be predicted or prevented. We compared this with the risks of a repeat  section, specifically risks of abnormal placentation in future pregnancies, increasing surgical complication rates with increasing number of abdominal surgeries, need for  section for all future deliveries. The patient understands these risks and desires TOLAC.         Objective:  Pre- Vitals      Flowsheet Row Most Recent Value   Prenatal Assessment    Fetal Heart Rate 112   Prenatal Vitals    Blood Pressure 131/81   Weight - Scale 110 kg (241 lb 9.6 oz)   Urine Albumin/Glucose    Dilation/Effacement/Station    Vaginal Drainage    Edema              Pregnancy Plan:  Pregnancy: Chilel     Delivery Plans  Deliver by GA (weeks): 40  Planned delivery method: TOLAC  Planned delivery location: [facility] L&[facility]  Planned anesthesia: Epidural  Acceptable blood products: All     Post-Delivery Plans  Feeding intentions: Breast Milk and Non-human milk substitute  Planned birth control: Female Sterilization      General: Well appearing, no distress  Respiratory: Unlabored breathing  Cardiovascular: Regular rate.  Abdomen: Soft, gravid, nontender  Fundal Height: Appropriate for gestational age.  Extremities: Warm and well perfused.  Non tender.        D/w Dr. Ron Hsu MD  PGY-2    12-14weeks: COVID vaccination visitor policy, genetic screening classes  16-18 weeks: sequential screening, level II ultrasound order, flu vaccine  26-28  weeks: 28 week labs (CBC, RPR, 1hr GTT), Rh status/rhogam, FKC, flu vaccine  28-32 weeks: delivery counseling, sign Ma31, tdap, flu vaccine  32 weeks: tdap, flu vaccine, check in card, rediscuss contraception, birth plan  36 weeks: collect GBS/PCN allergy, flu vaccine, SVE

## 2024-03-19 ENCOUNTER — ROUTINE PRENATAL (OUTPATIENT)
Dept: OBGYN CLINIC | Facility: CLINIC | Age: 34
End: 2024-03-19

## 2024-03-19 ENCOUNTER — APPOINTMENT (OUTPATIENT)
Dept: LAB | Facility: CLINIC | Age: 34
End: 2024-03-19
Payer: COMMERCIAL

## 2024-03-19 VITALS
HEART RATE: 81 BPM | SYSTOLIC BLOOD PRESSURE: 131 MMHG | DIASTOLIC BLOOD PRESSURE: 81 MMHG | BODY MASS INDEX: 38.83 KG/M2 | HEIGHT: 66 IN | WEIGHT: 241.6 LBS

## 2024-03-19 DIAGNOSIS — G40.909 SEIZURE DISORDER DURING PREGNANCY, ANTEPARTUM (HCC): ICD-10-CM

## 2024-03-19 DIAGNOSIS — R10.2 PELVIC PAIN: Primary | Chronic | ICD-10-CM

## 2024-03-19 DIAGNOSIS — N91.2 AMENORRHEA: ICD-10-CM

## 2024-03-19 DIAGNOSIS — R10.2 PELVIC PAIN: Chronic | ICD-10-CM

## 2024-03-19 DIAGNOSIS — O99.350 SEIZURE DISORDER DURING PREGNANCY, ANTEPARTUM (HCC): ICD-10-CM

## 2024-03-19 LAB
AMORPH URATE CRY URNS QL MICRO: ABNORMAL
BACTERIA UR QL AUTO: ABNORMAL /HPF
BILIRUB UR QL STRIP: NEGATIVE
CLARITY UR: ABNORMAL
COLOR UR: ABNORMAL
GLUCOSE UR STRIP-MCNC: NEGATIVE MG/DL
HGB UR QL STRIP.AUTO: NEGATIVE
KETONES UR STRIP-MCNC: NEGATIVE MG/DL
LEUKOCYTE ESTERASE UR QL STRIP: NEGATIVE
MUCOUS THREADS UR QL AUTO: ABNORMAL
NITRITE UR QL STRIP: NEGATIVE
NON-SQ EPI CELLS URNS QL MICRO: ABNORMAL /HPF
PH UR STRIP.AUTO: 6 [PH]
PROT UR STRIP-MCNC: ABNORMAL MG/DL
RBC #/AREA URNS AUTO: ABNORMAL /HPF
SP GR UR STRIP.AUTO: 1.03 (ref 1–1.03)
UROBILINOGEN UR STRIP-ACNC: <2 MG/DL
WBC #/AREA URNS AUTO: ABNORMAL /HPF

## 2024-03-19 PROCEDURE — 80177 DRUG SCRN QUAN LEVETIRACETAM: CPT

## 2024-03-19 PROCEDURE — 81001 URINALYSIS AUTO W/SCOPE: CPT

## 2024-03-19 PROCEDURE — 87147 CULTURE TYPE IMMUNOLOGIC: CPT

## 2024-03-19 PROCEDURE — 87086 URINE CULTURE/COLONY COUNT: CPT

## 2024-03-19 PROCEDURE — 36415 COLL VENOUS BLD VENIPUNCTURE: CPT

## 2024-03-21 LAB — BACTERIA UR CULT: NORMAL

## 2024-03-23 LAB — LEVETIRACETAM SERPL-MCNC: <2 UG/ML (ref 10–40)

## 2024-03-25 DIAGNOSIS — O99.350 SEIZURE DISORDER DURING PREGNANCY, ANTEPARTUM (HCC): Primary | ICD-10-CM

## 2024-03-25 DIAGNOSIS — G40.909 SEIZURE DISORDER DURING PREGNANCY, ANTEPARTUM (HCC): Primary | ICD-10-CM

## 2024-03-25 RX ORDER — LEVETIRACETAM 750 MG/1
750 TABLET ORAL 2 TIMES DAILY
Qty: 60 TABLET | Refills: 0 | Status: SHIPPED | OUTPATIENT
Start: 2024-03-25 | End: 2024-04-24

## 2024-03-25 NOTE — PROGRESS NOTES
"Called patient with  [phone] to discuss her subtherapeutic Keppra level. The patient does not have her next neurology appointment until April 23rd. She has been taking 500mg Keppra BID and does not miss a dose, though she did not get to take her Keppra the morning that the Keppra level was drawn. Given current Keppra level, we will give her a single \"bolus\" of 1000mg, and increase her dose to 750mg BID Keppra. I am sending her medication to the pharmacy. The patient was able to repeat back the plan and understands the increased dose.     Plan discussed with Dr. Ron DUBOSE PGY2  "

## 2024-03-26 ENCOUNTER — ROUTINE PRENATAL (OUTPATIENT)
Age: 34
End: 2024-03-26
Payer: COMMERCIAL

## 2024-03-26 VITALS
WEIGHT: 245.2 LBS | SYSTOLIC BLOOD PRESSURE: 116 MMHG | HEART RATE: 98 BPM | HEIGHT: 66 IN | DIASTOLIC BLOOD PRESSURE: 78 MMHG | BODY MASS INDEX: 39.41 KG/M2

## 2024-03-26 DIAGNOSIS — O09.522 MULTIGRAVIDA OF ADVANCED MATERNAL AGE IN SECOND TRIMESTER: ICD-10-CM

## 2024-03-26 DIAGNOSIS — O99.350 SEIZURE DISORDER DURING PREGNANCY, ANTEPARTUM (HCC): ICD-10-CM

## 2024-03-26 DIAGNOSIS — O34.219 HISTORY OF CESAREAN DELIVERY, ANTEPARTUM: ICD-10-CM

## 2024-03-26 DIAGNOSIS — G40.909 SEIZURE DISORDER DURING PREGNANCY, ANTEPARTUM (HCC): ICD-10-CM

## 2024-03-26 DIAGNOSIS — Z3A.20 20 WEEKS GESTATION OF PREGNANCY: Primary | ICD-10-CM

## 2024-03-26 DIAGNOSIS — O99.210 OBESITY IN PREGNANCY, ANTEPARTUM: ICD-10-CM

## 2024-03-26 PROCEDURE — 76811 OB US DETAILED SNGL FETUS: CPT | Performed by: OBSTETRICS & GYNECOLOGY

## 2024-03-26 PROCEDURE — 76817 TRANSVAGINAL US OBSTETRIC: CPT | Performed by: OBSTETRICS & GYNECOLOGY

## 2024-03-26 PROCEDURE — 99213 OFFICE O/P EST LOW 20 MIN: CPT | Performed by: OBSTETRICS & GYNECOLOGY

## 2024-03-26 NOTE — PROGRESS NOTES
Ultrasound Probe Disinfection    A transvaginal ultrasound was performed.   Prior to use, disinfection was performed with High Level Disinfection Process ([employer]).  Probe serial number S1: [id number] was used.      Adina Barroso  03/26/24  12:44 PM

## 2024-03-26 NOTE — LETTER
March 26, 2024     PILY Reyes  [address] [place] [address]    Patient: Ruth Self   YOB: 1990   Date of Visit: 3/26/2024       Dear Ms. Heather:    Thank you for referring Ruth Self to me for evaluation. Below are my notes for this consultation.    If you have questions, please do not hesitate to call me. I look forward to following your patient along with you.         Sincerely,        Aurelio Leone MD        CC: No Recipients    Aurelio Leone MD  3/26/2024  1:59 PM  Sign when Signing Visit  A fetal ultrasound was completed. See Ob procedures in Epic for an interpretation and recommendations. Do not hesitate to contact us in [facility] if you have questions.    Aurelio Leone MD, MSCE  Maternal Fetal Medicine

## 2024-03-26 NOTE — PROGRESS NOTES
A fetal ultrasound was completed. See Ob procedures in Epic for an interpretation and recommendations. Do not hesitate to contact us in [facility] if you have questions.    Aurelio Leone MD, MSCE  Maternal Fetal Medicine

## 2024-04-16 ENCOUNTER — ROUTINE PRENATAL (OUTPATIENT)
Dept: OBGYN CLINIC | Facility: CLINIC | Age: 34
End: 2024-04-16

## 2024-04-16 VITALS
SYSTOLIC BLOOD PRESSURE: 125 MMHG | WEIGHT: 243.6 LBS | BODY MASS INDEX: 39.15 KG/M2 | HEIGHT: 66 IN | DIASTOLIC BLOOD PRESSURE: 83 MMHG | HEART RATE: 94 BPM

## 2024-04-16 DIAGNOSIS — Z34.92 PRENATAL CARE IN SECOND TRIMESTER: Primary | ICD-10-CM

## 2024-04-16 DIAGNOSIS — Z3A.23 23 WEEKS GESTATION OF PREGNANCY: ICD-10-CM

## 2024-04-16 PROCEDURE — PNV: Performed by: NURSE PRACTITIONER

## 2024-04-16 NOTE — PATIENT INSTRUCTIONS
WARNING SIGNS DURING PREGNANCY  Call our office at [phone] for any of the followin. Vaginal bleeding  2. Sharp abdominal pain that does not go away  3. Fever (more than 100.4 and is not relieved by Tylenol)  4. Persistent vomiting lasting greater than 24 hours  5. Chest pain   6. Pain or burning when you urinate  7. Severe headache that doesn't resolve with Tylenol  8. Blurred vision or seeing spots in your vision  9. Sudden swelling of your face or hands  10. Redness, swelling or pain in a leg  11. A sudden weight gain in just a few days  12. Decrease in your baby's movement (after 28 weeks or the 6th month of pregnancy)  13. A loss of watery fluid from your vagina - can be a gush, a trickle or continuous wetness  14. After 20 weeks of pregnancy, rhythmic cramping (greater than 4 per hour) or menstrual like low/pelvic pain     Continue Keppra and LDASA as previously directed  Keep follow up  center appointment  Return in 4 weeks

## 2024-04-16 NOTE — PROGRESS NOTES
"Assessment & Plan  33 y.o.  at 23w6d presenting for routine prenatal visit.   Pt had WNL Level II U/S a growth and anatomy visualized. Pt has F/U appointment in the PNC  Pt is taking LSADA and Keppra as directed    Problem List Items Addressed This Visit          Obstetrics/Gynecology    23 weeks gestation of pregnancy     Other Visit Diagnoses       Prenatal care in second trimester    -  Primary          ____________________________________________________________  Subjective  She is without complaint.   She denies contractions, loss of fluid, or vaginal bleeding.   She feels regular fetal movements.     WNL respiratory effort, negative cough or SOB    Objective  /83 (BP Location: Right arm, Patient Position: Sitting)   Pulse 94   Ht 5' 6\" (1.676 m)   Wt 110 kg (243 lb 9.6 oz)   LMP 2023   BMI 39.32 kg/m²          Patient's Active Problem List  Patient Active Problem List   Diagnosis    Rubella non-immune status, antepartum    Pelvic pain    Obesity in pregnancy, antepartum    History of  delivery, antepartum    Seizure disorder during pregnancy, antepartum (HCC)    Advanced maternal age in multigravida    23 weeks gestation of pregnancy     Plan  Continue Keppra and LDASA as directed  Keep F/U appointment in the PNC  To call with vaginal bleeding, loss of fluid, regular contractions, decreased fetal movement, other needs or concerns.  Return in 4 weeks  Pt verbalized understanding of all discussed.    "

## 2024-04-23 NOTE — PROGRESS NOTES
Please refer to the [facility] ultrasound report in Ob Procedures for additional information regarding today's visit

## 2024-04-25 ENCOUNTER — ULTRASOUND (OUTPATIENT)
Age: 34
End: 2024-04-25
Payer: COMMERCIAL

## 2024-04-25 VITALS
SYSTOLIC BLOOD PRESSURE: 126 MMHG | DIASTOLIC BLOOD PRESSURE: 78 MMHG | BODY MASS INDEX: 39.79 KG/M2 | WEIGHT: 247.6 LBS | HEART RATE: 87 BPM | HEIGHT: 66 IN

## 2024-04-25 DIAGNOSIS — G40.909 SEIZURE DISORDER DURING PREGNANCY IN SECOND TRIMESTER (HCC): Primary | ICD-10-CM

## 2024-04-25 DIAGNOSIS — O99.212 MATERNAL OBESITY, ANTEPARTUM, SECOND TRIMESTER: ICD-10-CM

## 2024-04-25 DIAGNOSIS — Z3A.25 25 WEEKS GESTATION OF PREGNANCY: ICD-10-CM

## 2024-04-25 DIAGNOSIS — O99.352 SEIZURE DISORDER DURING PREGNANCY IN SECOND TRIMESTER (HCC): Primary | ICD-10-CM

## 2024-04-25 DIAGNOSIS — O36.5920 INTRAUTERINE GROWTH RESTRICTION AFFECTING ANTEPARTUM CARE OF MOTHER IN SECOND TRIMESTER, SINGLE OR UNSPECIFIED FETUS: ICD-10-CM

## 2024-04-25 PROCEDURE — 99214 OFFICE O/P EST MOD 30 MIN: CPT | Performed by: OBSTETRICS & GYNECOLOGY

## 2024-04-25 PROCEDURE — 76820 UMBILICAL ARTERY ECHO: CPT | Performed by: OBSTETRICS & GYNECOLOGY

## 2024-04-25 PROCEDURE — 76816 OB US FOLLOW-UP PER FETUS: CPT | Performed by: OBSTETRICS & GYNECOLOGY

## 2024-04-25 RX ORDER — LEVETIRACETAM 500 MG/1
TABLET ORAL
COMMUNITY
Start: 2024-04-21

## 2024-04-25 NOTE — LETTER
April 25, 2024     PILY Reyes  [address] [place], [place] [address]    Patient: Ruth Self   YOB: 1990   Date of Visit: 4/25/2024       Dear Dr. Romero:    Thank you for referring Ruth Self to me for evaluation. Below are my notes for this consultation.    If you have questions, please do not hesitate to call me. I look forward to following your patient along with you.         Sincerely,        Dominic Matias MD        CC: No Recipients    Dominic Matias MD  4/25/2024  2:00 PM  Sign when Signing Visit  Please refer to the [facility] ultrasound report in Ob Procedures for additional information regarding today's visit

## 2024-05-01 ENCOUNTER — ULTRASOUND (OUTPATIENT)
Facility: HOSPITAL | Age: 34
End: 2024-05-01
Payer: COMMERCIAL

## 2024-05-01 VITALS
BODY MASS INDEX: 39.47 KG/M2 | SYSTOLIC BLOOD PRESSURE: 118 MMHG | DIASTOLIC BLOOD PRESSURE: 78 MMHG | HEART RATE: 82 BPM | WEIGHT: 245.6 LBS | HEIGHT: 66 IN

## 2024-05-01 DIAGNOSIS — O36.5920 INTRAUTERINE GROWTH RESTRICTION AFFECTING ANTEPARTUM CARE OF MOTHER IN SECOND TRIMESTER, SINGLE OR UNSPECIFIED FETUS: Primary | ICD-10-CM

## 2024-05-01 DIAGNOSIS — Z3A.26 26 WEEKS GESTATION OF PREGNANCY: ICD-10-CM

## 2024-05-01 PROCEDURE — 76820 UMBILICAL ARTERY ECHO: CPT | Performed by: OBSTETRICS & GYNECOLOGY

## 2024-05-01 PROCEDURE — 59025 FETAL NON-STRESS TEST: CPT | Performed by: OBSTETRICS & GYNECOLOGY

## 2024-05-01 PROCEDURE — 76815 OB US LIMITED FETUS(S): CPT | Performed by: OBSTETRICS & GYNECOLOGY

## 2024-05-01 NOTE — PROGRESS NOTES
Repeat Non-Stress Testing:    Patient verbalizes +FM. Pt denies ALL:               Leaking of fluid   Contractions   Vaginal bleeding   Decreased fetal movement    Patient is performing daily kick counts. Patient has no questions or concerns.   NST strip reviewed by Dr. Domínguez prior to completion of NST.

## 2024-05-01 NOTE — LETTER
NST sleeve cover sheet    Patient name: Ruth Self  : 1990  MRN: [id number]    POLO: Estimated Date of Delivery: 24    Obstetrician: [facility]    Reason(s) for testing:  BMI >35  __________________________________________      Testing frequency:    ___ 2x/wk  ___ 1x/wk  ___ Dopplers  ___ BPP?      Last growth scan: __________________________________________

## 2024-05-08 ENCOUNTER — ULTRASOUND (OUTPATIENT)
Age: 34
End: 2024-05-08
Payer: COMMERCIAL

## 2024-05-08 ENCOUNTER — APPOINTMENT (OUTPATIENT)
Dept: LAB | Facility: CLINIC | Age: 34
End: 2024-05-08
Payer: COMMERCIAL

## 2024-05-08 VITALS
WEIGHT: 249 LBS | SYSTOLIC BLOOD PRESSURE: 120 MMHG | HEIGHT: 66 IN | DIASTOLIC BLOOD PRESSURE: 78 MMHG | BODY MASS INDEX: 40.02 KG/M2 | HEART RATE: 102 BPM

## 2024-05-08 DIAGNOSIS — Z3A.27 27 WEEKS GESTATION OF PREGNANCY: ICD-10-CM

## 2024-05-08 DIAGNOSIS — O99.352 SEIZURE DISORDER DURING PREGNANCY IN SECOND TRIMESTER (HCC): ICD-10-CM

## 2024-05-08 DIAGNOSIS — G40.909 SEIZURE DISORDER DURING PREGNANCY IN SECOND TRIMESTER (HCC): ICD-10-CM

## 2024-05-08 DIAGNOSIS — O36.5920 INTRAUTERINE GROWTH RESTRICTION AFFECTING ANTEPARTUM CARE OF MOTHER IN SECOND TRIMESTER, SINGLE OR UNSPECIFIED FETUS: Primary | ICD-10-CM

## 2024-05-08 LAB — LEVETIRACETAM SERPL-MCNC: 3.7 UG/ML (ref 12–46)

## 2024-05-08 PROCEDURE — 59025 FETAL NON-STRESS TEST: CPT | Performed by: OBSTETRICS & GYNECOLOGY

## 2024-05-08 PROCEDURE — 76820 UMBILICAL ARTERY ECHO: CPT | Performed by: OBSTETRICS & GYNECOLOGY

## 2024-05-08 PROCEDURE — 76815 OB US LIMITED FETUS(S): CPT | Performed by: OBSTETRICS & GYNECOLOGY

## 2024-05-08 PROCEDURE — 83520 IMMUNOASSAY QUANT NOS NONAB: CPT

## 2024-05-08 PROCEDURE — 36415 COLL VENOUS BLD VENIPUNCTURE: CPT

## 2024-05-08 NOTE — LETTER
May 8, 2024     Suhas John MD  [address] [id number]    Patient: Ruth Self   YOB: 1990   Date of Visit: 5/8/2024       Dear Dr. John:    Thank you for referring Ruth Self to me for evaluation. Below are my notes for this consultation.    If you have questions, please do not hesitate to call me. I look forward to following your patient along with you.         Sincerely,        Adina Reaves MD        CC: No Recipients

## 2024-05-08 NOTE — PROGRESS NOTES
Repeat Non-Stress Testing:    Patient verbalizes +FM. Pt denies ALL:               Leaking of fluid   Contractions   Vaginal bleeding   Decreased fetal movement    Patient is performing daily kick counts. Patient has no questions or concerns.   NST strip reviewed by Dr. Reaves.

## 2024-05-15 ENCOUNTER — APPOINTMENT (OUTPATIENT)
Dept: LAB | Facility: CLINIC | Age: 34
End: 2024-05-15
Payer: COMMERCIAL

## 2024-05-15 ENCOUNTER — ROUTINE PRENATAL (OUTPATIENT)
Dept: OBGYN CLINIC | Facility: CLINIC | Age: 34
End: 2024-05-15

## 2024-05-15 VITALS
WEIGHT: 248.6 LBS | BODY MASS INDEX: 39.95 KG/M2 | SYSTOLIC BLOOD PRESSURE: 115 MMHG | HEART RATE: 82 BPM | DIASTOLIC BLOOD PRESSURE: 78 MMHG | HEIGHT: 66 IN

## 2024-05-15 DIAGNOSIS — O36.5930 INTRAUTERINE GROWTH RESTRICTION (IUGR) AFFECTING CARE OF MOTHER, THIRD TRIMESTER, SINGLE GESTATION: ICD-10-CM

## 2024-05-15 DIAGNOSIS — Z23 ENCOUNTER FOR IMMUNIZATION: ICD-10-CM

## 2024-05-15 DIAGNOSIS — O09.523 MULTIGRAVIDA OF ADVANCED MATERNAL AGE IN THIRD TRIMESTER: Primary | ICD-10-CM

## 2024-05-15 DIAGNOSIS — Z3A.28 28 WEEKS GESTATION OF PREGNANCY: ICD-10-CM

## 2024-05-15 LAB
ERYTHROCYTE [DISTWIDTH] IN BLOOD BY AUTOMATED COUNT: 13.1 % (ref 11.6–15.1)
GLUCOSE 1H P 50 G GLC PO SERPL-MCNC: 137 MG/DL (ref 70–134)
HCT VFR BLD AUTO: 35.1 % (ref 34.8–46.1)
HGB BLD-MCNC: 11.3 G/DL (ref 11.5–15.4)
MCH RBC QN AUTO: 31.7 PG (ref 26.8–34.3)
MCHC RBC AUTO-ENTMCNC: 32.2 G/DL (ref 31.4–37.4)
MCV RBC AUTO: 98 FL (ref 82–98)
PLATELET # BLD AUTO: 334 THOUSANDS/UL (ref 149–390)
PMV BLD AUTO: 10.6 FL (ref 8.9–12.7)
RBC # BLD AUTO: 3.57 MILLION/UL (ref 3.81–5.12)
WBC # BLD AUTO: 8.96 THOUSAND/UL (ref 4.31–10.16)

## 2024-05-15 PROCEDURE — 36415 COLL VENOUS BLD VENIPUNCTURE: CPT

## 2024-05-15 PROCEDURE — 90715 TDAP VACCINE 7 YRS/> IM: CPT | Performed by: OBSTETRICS & GYNECOLOGY

## 2024-05-15 PROCEDURE — 82950 GLUCOSE TEST: CPT

## 2024-05-15 PROCEDURE — 85027 COMPLETE CBC AUTOMATED: CPT

## 2024-05-15 PROCEDURE — PNV: Performed by: OBSTETRICS & GYNECOLOGY

## 2024-05-15 PROCEDURE — 90471 IMMUNIZATION ADMIN: CPT | Performed by: OBSTETRICS & GYNECOLOGY

## 2024-05-15 PROCEDURE — 86780 TREPONEMA PALLIDUM: CPT

## 2024-05-15 NOTE — PROGRESS NOTES
28 week teaching done. Tdap given in left arm. Breast pump ordered.    NDC - [id number]  LOT - [id number]  EXP - 2/10/2026

## 2024-05-15 NOTE — PROGRESS NOTES
Please refer to the [facility] ultrasound report in Ob Procedures for additional information regarding today's visit

## 2024-05-15 NOTE — PROGRESS NOTES
"Assessment & Plan  : [id number]  33 y.o.  at 28w0d presenting for routine prenatal visit.       Problem List       Rubella non-immune status, antepartum    Overview     Will need MMR postpartum          Pelvic pain (Chronic)    Overview     Follow-up urinalysis/urine culture ordered on 3/19/24         Obesity in pregnancy, antepartum    Overview     - fetal surveillence to start at 37 weeks gestation [ ]  -Early 1hr gtt wnl           History of  delivery, antepartum    Overview     History of CS for breech presentation          Seizure disorder during pregnancy, antepartum (HCC)    Overview     Currently on Keppra   Follows with Neurology at [facility]   Last seizure 2 years ago   Patient has appointment with [facility] on 24  Currently taking Folate   Keppra level ordered 3/19/24         Advanced maternal age in multigravida    28 weeks gestation of pregnancy    Overview     Overview:  Labs  Pap smear 2021, NILM   HIV, RPR, GC/CT, Hep C: negative   Rubella: non-immune  Hep B: immune  Starting Hgb/Plt: 11.9/369  All other prenatal labs WNL   NIPT/MSAFP - low risk, negative screen  28w labs order closer to 28w visit   GBS to be completed around 36w    Vaccines:  Flu vaccine: given   Tdap vaccine: offer around 28w   Contraception: POPs   Delivery plan: TOLAC (1st CS was for breech)  Delivery consent: to be signed at 28w  Ultrasounds:   First trimester: normal NT  Level II: 3/26/24           Intrauterine growth restriction (IUGR) affecting care of mother, third trimester, single gestation     Other Visit Diagnoses       Encounter for immunization              ____________________________________________________________  Subjective  She is without complaint.   She denies contractions, loss of fluid, or vaginal bleeding.   She feels regular fetal movements.       Objective  /78 (BP Location: Right arm, Patient Position: Sitting, Cuff Size: Large)   Pulse 82   Ht 5' 6\" (1.676 " m)   Wt 113 kg (248 lb 9.6 oz)   LMP 2023   BMI 40.13 kg/m²   FHR: 140's via doppler    Physical Exam  Constitutional:       Appearance: She is well-developed.   Cardiovascular:      Rate and Rhythm: Normal rate and regular rhythm.      Heart sounds: Normal heart sounds. No murmur heard.     No friction rub. No gallop.   Pulmonary:      Effort: Pulmonary effort is normal. No respiratory distress.      Breath sounds: No wheezing.   Abdominal:      Palpations: Abdomen is soft.      Tenderness: There is no abdominal tenderness.   Musculoskeletal:         General: No tenderness.   Neurological:      Mental Status: She is alert and oriented to person, place, and time.   Vitals reviewed.          Patient's Active Problem List  Patient Active Problem List   Diagnosis    Rubella non-immune status, antepartum    Pelvic pain    Obesity in pregnancy, antepartum    History of  delivery, antepartum    Seizure disorder during pregnancy, antepartum (HCC)    Advanced maternal age in multigravida    28 weeks gestation of pregnancy    Intrauterine growth restriction (IUGR) affecting care of mother, third trimester, single gestation           Suhas John MD  5/15/2024  10:53 AM

## 2024-05-16 ENCOUNTER — ULTRASOUND (OUTPATIENT)
Dept: PERINATAL CARE | Facility: OTHER | Age: 34
End: 2024-05-16
Payer: COMMERCIAL

## 2024-05-16 VITALS
SYSTOLIC BLOOD PRESSURE: 116 MMHG | HEART RATE: 94 BPM | WEIGHT: 247.4 LBS | HEIGHT: 66 IN | DIASTOLIC BLOOD PRESSURE: 74 MMHG | BODY MASS INDEX: 39.76 KG/M2

## 2024-05-16 DIAGNOSIS — O09.523 ELDERLY MULTIGRAVIDA, THIRD TRIMESTER: ICD-10-CM

## 2024-05-16 DIAGNOSIS — O99.213 MATERNAL OBESITY, ANTEPARTUM, THIRD TRIMESTER: ICD-10-CM

## 2024-05-16 DIAGNOSIS — O36.5930 INTRAUTERINE GROWTH RESTRICTION AFFECTING ANTEPARTUM CARE OF MOTHER IN THIRD TRIMESTER, SINGLE OR UNSPECIFIED FETUS: Primary | ICD-10-CM

## 2024-05-16 DIAGNOSIS — O99.810 ABNORMAL GLUCOSE AFFECTING PREGNANCY: ICD-10-CM

## 2024-05-16 DIAGNOSIS — Z3A.28 28 WEEKS GESTATION OF PREGNANCY: ICD-10-CM

## 2024-05-16 DIAGNOSIS — O99.810 ABNORMAL MATERNAL GLUCOSE TOLERANCE, ANTEPARTUM: Primary | ICD-10-CM

## 2024-05-16 LAB
DME PARACHUTE DELIVERY DATE REQUESTED: NORMAL
DME PARACHUTE ITEM DESCRIPTION: NORMAL
DME PARACHUTE ORDER STATUS: NORMAL
DME PARACHUTE SUPPLIER NAME: NORMAL
DME PARACHUTE SUPPLIER PHONE: NORMAL
TREPONEMA PALLIDUM IGG+IGM AB [PRESENCE] IN SERUM OR PLASMA BY IMMUNOASSAY: NORMAL

## 2024-05-16 PROCEDURE — 76816 OB US FOLLOW-UP PER FETUS: CPT | Performed by: OBSTETRICS & GYNECOLOGY

## 2024-05-16 PROCEDURE — 59025 FETAL NON-STRESS TEST: CPT | Performed by: OBSTETRICS & GYNECOLOGY

## 2024-05-16 PROCEDURE — 99214 OFFICE O/P EST MOD 30 MIN: CPT | Performed by: OBSTETRICS & GYNECOLOGY

## 2024-05-16 PROCEDURE — 76820 UMBILICAL ARTERY ECHO: CPT | Performed by: OBSTETRICS & GYNECOLOGY

## 2024-05-16 NOTE — PROGRESS NOTES
Repeat Non-Stress Testing:    Patient verbalizes +FM. Pt denies ALL:               Leaking of fluid   Contractions   Vaginal bleeding   Decreased fetal movement    Patient is performing daily kick counts. Patient has no questions or concerns.   NST strip reviewed by Dr. Matias.

## 2024-05-16 NOTE — LETTER
NST sleeve cover sheet    Patient name: Ruth Self  : 1990  MRN: [id number]    POLO: Estimated Date of Delivery: 24    Obstetrician: __________SW Sigal______________    Reason(s) for testing:  ________________FGR (AC 8% overalll 17%)_____________________      Testing frequency:    ___ 2x/wk  _x_ 1x/wk  _x_ Dopplers  ___ BPP?      Last growth scan: ____sched ______________________________________

## 2024-05-16 NOTE — LETTER
May 16, 2024     PILY Reyes  [address] [place], [place] [address]    Patient: Ruth Self   YOB: 1990   Date of Visit: 5/16/2024       Dear Dr. Romero:    Thank you for referring Ruth Self to me for evaluation. Below are my notes for this consultation.    If you have questions, please do not hesitate to call me. I look forward to following your patient along with you.         Sincerely,        Dominic Matias MD        CC: No Recipients    Dominic Matias MD  5/16/2024  9:32 AM  Sign when Signing Visit  Please refer to the [facility] ultrasound report in Ob Procedures for additional information regarding today's visit

## 2024-05-21 ENCOUNTER — APPOINTMENT (OUTPATIENT)
Dept: LAB | Facility: CLINIC | Age: 34
End: 2024-05-21
Payer: COMMERCIAL

## 2024-05-21 ENCOUNTER — ULTRASOUND (OUTPATIENT)
Dept: PERINATAL CARE | Facility: OTHER | Age: 34
End: 2024-05-21
Payer: COMMERCIAL

## 2024-05-21 ENCOUNTER — APPOINTMENT (OUTPATIENT)
Dept: LAB | Facility: HOSPITAL | Age: 34
End: 2024-05-21
Payer: COMMERCIAL

## 2024-05-21 VITALS
BODY MASS INDEX: 39.86 KG/M2 | DIASTOLIC BLOOD PRESSURE: 68 MMHG | HEIGHT: 66 IN | WEIGHT: 248 LBS | SYSTOLIC BLOOD PRESSURE: 118 MMHG | HEART RATE: 102 BPM

## 2024-05-21 DIAGNOSIS — Z3A.28 28 WEEKS GESTATION OF PREGNANCY: ICD-10-CM

## 2024-05-21 DIAGNOSIS — O36.5930 INTRAUTERINE GROWTH RESTRICTION (IUGR) AFFECTING CARE OF MOTHER, THIRD TRIMESTER, SINGLE GESTATION: Primary | ICD-10-CM

## 2024-05-21 DIAGNOSIS — O99.810 ABNORMAL GLUCOSE AFFECTING PREGNANCY: ICD-10-CM

## 2024-05-21 LAB
GLUCOSE 1H P 100 G GLC PO SERPL-MCNC: 125 MG/DL (ref 65–179)
GLUCOSE 2H P 100 G GLC PO SERPL-MCNC: 134 MG/DL (ref 65–154)
GLUCOSE 3H P 100 G GLC PO SERPL-MCNC: 105 MG/DL (ref 65–139)
GLUCOSE P FAST SERPL-MCNC: 75 MG/DL (ref 65–94)

## 2024-05-21 PROCEDURE — 76815 OB US LIMITED FETUS(S): CPT | Performed by: NURSE PRACTITIONER

## 2024-05-21 PROCEDURE — 76820 UMBILICAL ARTERY ECHO: CPT | Performed by: NURSE PRACTITIONER

## 2024-05-21 PROCEDURE — 82951 GLUCOSE TOLERANCE TEST (GTT): CPT

## 2024-05-21 PROCEDURE — 99214 OFFICE O/P EST MOD 30 MIN: CPT | Performed by: NURSE PRACTITIONER

## 2024-05-21 PROCEDURE — 82952 GTT-ADDED SAMPLES: CPT

## 2024-05-21 PROCEDURE — 59025 FETAL NON-STRESS TEST: CPT | Performed by: NURSE PRACTITIONER

## 2024-05-21 PROCEDURE — 36415 COLL VENOUS BLD VENIPUNCTURE: CPT

## 2024-05-21 NOTE — PROGRESS NOTES
"[facility]: Ms. Self was seen today at 28w6d gestational age for umbilical artery Doppler study, VALENTÍN, and NST.  See ultrasound report under \"OB Procedures\" tab.    Herlinda NASCIMENTO    I spent 12 minutes devoted to patient care (4 min chart preparation, 4 minutes face to face and 4 minutes documenting).    "

## 2024-05-21 NOTE — PROGRESS NOTES
Repeat Non-Stress Testing:    Patient verbalizes +FM. Pt denies ALL:               Leaking of fluid   Contractions   Vaginal bleeding   Decreased fetal movement    Patient is performing daily kick counts. Patient has no questions or concerns.   NST strip reviewed by PILY Black.

## 2024-05-30 ENCOUNTER — ROUTINE PRENATAL (OUTPATIENT)
Dept: OBGYN CLINIC | Facility: CLINIC | Age: 34
End: 2024-05-30

## 2024-05-30 VITALS
WEIGHT: 248.8 LBS | HEIGHT: 66 IN | DIASTOLIC BLOOD PRESSURE: 79 MMHG | BODY MASS INDEX: 39.98 KG/M2 | SYSTOLIC BLOOD PRESSURE: 118 MMHG | HEART RATE: 76 BPM

## 2024-05-30 DIAGNOSIS — G40.909 SEIZURE DISORDER DURING PREGNANCY, ANTEPARTUM (HCC): ICD-10-CM

## 2024-05-30 DIAGNOSIS — O99.350 SEIZURE DISORDER DURING PREGNANCY, ANTEPARTUM (HCC): ICD-10-CM

## 2024-05-30 DIAGNOSIS — Z3A.30 30 WEEKS GESTATION OF PREGNANCY: ICD-10-CM

## 2024-05-30 DIAGNOSIS — Z34.93 PRENATAL CARE IN THIRD TRIMESTER: Primary | ICD-10-CM

## 2024-05-30 PROCEDURE — PNV: Performed by: NURSE PRACTITIONER

## 2024-05-30 RX ORDER — LEVETIRACETAM 500 MG/1
1000 TABLET ORAL DAILY
Status: SHIPPED
Start: 2024-05-30

## 2024-05-30 NOTE — PROGRESS NOTES
"Assessment & Plan  33 y.o.  at 30w1d presenting for routine prenatal visit.   Last Keppra level was 3.7, increased from <2.0 in march. Pt states after 3/3024 Keppra was increased from 500 mg to 1000 mg daily  Pt states she see Dr. Carlton Rubio at [facility] neurology, Pt states next Keppra level is ordered 2024 and her next appointment with Joanne Sawyer is 7/10/2024  Explained other 28 week labs are WNL    Problem List Items Addressed This Visit          Nervous and Auditory    Seizure disorder during pregnancy, antepartum ([facility])    Relevant Medications    levETIRAcetam (KEPPRA) 500 mg tablet       Obstetrics/Gynecology    30 weeks gestation of pregnancy     Other Visit Diagnoses       Prenatal care in third trimester    -  Primary          ____________________________________________________________  Subjective  She is without complaint.   She denies contractions, loss of fluid, or vaginal bleeding.   She feels regular fetal movements.     Objective  /79 (BP Location: Left arm, Patient Position: Sitting, Cuff Size: Large)   Pulse 76   Ht 5' 6\" (1.676 m)   Wt 113 kg (248 lb 12.8 oz)   LMP 2023   BMI 40.16 kg/m²     Fetal Heart Rate: 145    Patient's Active Problem List  Patient Active Problem List   Diagnosis    Rubella non-immune status, antepartum    Pelvic pain    Obesity in pregnancy, antepartum    History of  delivery, antepartum    Seizure disorder during pregnancy, antepartum ([facility])    Advanced maternal age in multigravida    30 weeks gestation of pregnancy    Intrauterine growth restriction (IUGR) affecting care of mother, third trimester, single gestation     Plan  Keep Neology appointment and complete lab work for Keppra Level as ordered  To call with vaginal bleeding, loss of fluid, regular contractions, decreased fetal movement, other needs or concerns.  Return in 2 weeks  Pt verbalized understanding of all discussed.       "

## 2024-05-30 NOTE — PATIENT INSTRUCTIONS
PEARL DE PARTO   Llame a nuestra oficina al [phone] para cualquiera de los siguientes:    * Necesito llamar inmediatamente yo si tengo incluso shorty pequeña cantidad de LIQUIDO se escapa de mi vagina, con o sin contracciones.   * Keira llamar si tengo SANGRADO shorty cantidad igual o más que un período. Shorty pequeña cantidad de flujo vaginal sangriento es normal al final del embarazo.   * Keira llamar si tengo CONTRACCIONES cada yordy minutos alvaro al menos shorty hora. Necesito un reloj para tiempo. Yo keira tiempo desde el comienzo de shorty contracción hasta el comienzo de la siguiente.   * De ser necesario ANTES DE  ir al hospital.   * Necesito tener un plan para TRANSPORTE a ir al hospital cuando estoy en trabajo de parto. Trabajo generalmente no es shorty emergencia que requiere shorty ambulancia.          CUENTAS DEL RETROCESO FETAL    En el tercer trimestre (después de 28 semanas de gestación) deben realizar patada fetal cuentas cada día. [place] bebé debe moverse al menos 10 veces en 2 horas alvaro un tiempo activo, shorty vez al día.    Elegir el atime del día cuando el bebé es más activo. Trate de hacerlo a la misma hora cada día. Entrar en shorty posición cómoda y luego escribir el tiempo que tu bebé se mueve lucita. Cuenta cada movimiento hasta que el bebé se mueve 10 veces. Estos movimientos incluyen patadas, puñetazos, codazos, revolotea o rollos. [place] puede tardar entre 5 minutos a 2 horas. Anote el tiempo que sientes movimiento 10 del bebé.    Si ha pasado 2 horas y tu bebé no se ha movido al menos 10 veces, usted debe llamar a la oficina de inmediato. [phone]          MASAJE EN LA GYPSY PERINEAL O VAGINAL    Que puedo hacer ahora para reducir las probabilidades de desgarro alvaro el parto?  Masaje alrededor del orificio de la vagina realizado por usted misma (o por herndon miguel ángel).  El masaje en esta gypsy, ya sea antes del parto o alvaro la segunda etapa del parto, puede reducir la probabilidad de desgarro perineal  alvaro el parto.  Asimismo, el uso de compresas tibias en el perineo alvaro la etapa expulsiva del parto puede reducir la pravedad del desparro.  Ola sucedera alvaro la etapa expulsiva del parto.  En casa tambien puede reducir las probabilidades de sufrir lesiones durnate el parto de con masajes en la rosalia perineal.    Quien?  Todas las mujeres embarazadas a partir de, aproximadamente, las 34 semanas de embarazo.    Cuando?  Usted o herndon miguel ángel deben hacer masajes en la rosalia vaginal alvaro 5 a 10 minutos de 1 a 4 veces por semana.    Anna?  Use shorty mezcla de agua y aceite de almendras, floyd u latif con 1 o 2 dedos (adriana la comodidad).  Inserte los dedos en la vagina entre 3 y 5cm.  Aplique presion general hacia abajo y hacia los costados alvaro 5 a 10 minutos entre las 3 y las 9 horas, de 1 a 4 veces por semana.          GROUP B STREP    Group B Strep (GBS) es shorty bacteria vaginal común. Aproximadamente el 25% de las mujeres normalmente tienen la bacteria GBS en la vagina. NO es shorty infección de transmisión sexual. ¡De hecho, no es shorty infección! Es solo shorty bacteria vaginal normal para muchas mujeres.    Sin embargo, la bacteria GBS puede ser peligrosas para un bebé recién nacido si el bebé está expuesto a celestine bacteria particular alvaro el parto y el nacimiento Y desarrolla shorty infección de la bacteria. La probabilidad de shorty infección de GBS en recién nacidos para shorty inge que tiene las bacteria GBS en la vagina es cerca de 1% - 2%. Lyndsay si la infeccion produce, un bebé podría ser gravemente enfermo.    Por esta razón, hacemos un cultivo vaginal (Q-Tip de la vagina y el recto) para todas las mujeres embarazadas en el aproximadamente 36 semanas de embarazo. Si el cultivo demuestra que hay bacteria GBS presente, no es shorty razón para el pánico! Porque en esta situación dará olga antibióticos de la inge a través de herndon IV mientras está en parto. Cuando shorty madre se trata con antibióticos alvaro el parto, herndon  bebé MARK NUNCA se infecta con la bacteria GBS.    Keep Neology appointment  Complete lab work for Keppra ordered   Return in 2 weeks

## 2024-06-06 LAB
DME PARACHUTE DELIVERY DATE REQUESTED: NORMAL
DME PARACHUTE ITEM DESCRIPTION: NORMAL
DME PARACHUTE ORDER STATUS: NORMAL
DME PARACHUTE SUPPLIER NAME: NORMAL
DME PARACHUTE SUPPLIER PHONE: NORMAL

## 2024-06-13 ENCOUNTER — ROUTINE PRENATAL (OUTPATIENT)
Dept: PERINATAL CARE | Facility: CLINIC | Age: 34
End: 2024-06-13
Payer: COMMERCIAL

## 2024-06-13 ENCOUNTER — ULTRASOUND (OUTPATIENT)
Dept: PERINATAL CARE | Facility: CLINIC | Age: 34
End: 2024-06-13
Payer: COMMERCIAL

## 2024-06-13 ENCOUNTER — ROUTINE PRENATAL (OUTPATIENT)
Dept: OBGYN CLINIC | Facility: CLINIC | Age: 34
End: 2024-06-13

## 2024-06-13 VITALS
HEART RATE: 76 BPM | HEIGHT: 66 IN | WEIGHT: 243.6 LBS | DIASTOLIC BLOOD PRESSURE: 78 MMHG | BODY MASS INDEX: 39.15 KG/M2 | SYSTOLIC BLOOD PRESSURE: 117 MMHG

## 2024-06-13 VITALS
BODY MASS INDEX: 39.6 KG/M2 | HEIGHT: 66 IN | WEIGHT: 246.4 LBS | DIASTOLIC BLOOD PRESSURE: 60 MMHG | HEART RATE: 91 BPM | SYSTOLIC BLOOD PRESSURE: 112 MMHG

## 2024-06-13 DIAGNOSIS — O36.5930 INTRAUTERINE GROWTH RESTRICTION (IUGR) AFFECTING CARE OF MOTHER, THIRD TRIMESTER, SINGLE GESTATION: Primary | ICD-10-CM

## 2024-06-13 DIAGNOSIS — O09.523 SUPERVISION OF ELDERLY MULTIGRAVIDA IN THIRD TRIMESTER: ICD-10-CM

## 2024-06-13 DIAGNOSIS — Z3A.32 32 WEEKS GESTATION OF PREGNANCY: ICD-10-CM

## 2024-06-13 DIAGNOSIS — O36.5930 INTRAUTERINE GROWTH RESTRICTION (IUGR) AFFECTING CARE OF MOTHER, THIRD TRIMESTER, SINGLE GESTATION: ICD-10-CM

## 2024-06-13 DIAGNOSIS — O28.8 NON-REACTIVE NST (NON-STRESS TEST): ICD-10-CM

## 2024-06-13 DIAGNOSIS — G40.909 SEIZURE DISORDER DURING PREGNANCY, ANTEPARTUM (HCC): ICD-10-CM

## 2024-06-13 DIAGNOSIS — Z3A.32 32 WEEKS GESTATION OF PREGNANCY: Primary | ICD-10-CM

## 2024-06-13 DIAGNOSIS — Z34.93 PRENATAL CARE IN THIRD TRIMESTER: Primary | ICD-10-CM

## 2024-06-13 DIAGNOSIS — O99.350 SEIZURE DISORDER DURING PREGNANCY, ANTEPARTUM (HCC): ICD-10-CM

## 2024-06-13 DIAGNOSIS — O99.210 OBESITY IN PREGNANCY, ANTEPARTUM: ICD-10-CM

## 2024-06-13 DIAGNOSIS — O09.523 MULTIGRAVIDA OF ADVANCED MATERNAL AGE IN THIRD TRIMESTER: ICD-10-CM

## 2024-06-13 PROCEDURE — 59025 FETAL NON-STRESS TEST: CPT | Performed by: OBSTETRICS & GYNECOLOGY

## 2024-06-13 PROCEDURE — 99214 OFFICE O/P EST MOD 30 MIN: CPT | Performed by: OBSTETRICS & GYNECOLOGY

## 2024-06-13 PROCEDURE — PNV: Performed by: NURSE PRACTITIONER

## 2024-06-13 PROCEDURE — 76816 OB US FOLLOW-UP PER FETUS: CPT | Performed by: OBSTETRICS & GYNECOLOGY

## 2024-06-13 PROCEDURE — 76820 UMBILICAL ARTERY ECHO: CPT | Performed by: OBSTETRICS & GYNECOLOGY

## 2024-06-13 NOTE — PROGRESS NOTES
Repeat Non-Stress Testing:    Patient verbalizes +FM. Pt denies ALL:               Leaking of fluid   Contractions   Vaginal bleeding   Decreased fetal movement    Patient is performing daily kick counts. Patient has no questions or concerns.   NST strip reviewed by Dr. Leone and ordered BPP for today's visit

## 2024-06-13 NOTE — PROGRESS NOTES
A fetal ultrasound and NST were completed. See Ob procedures in Epic for an interpretation and recommendations. Do not hesitate to contact us in [facility] if you have questions.    Aurelio Leone MD, MSCE  Maternal Fetal Medicine

## 2024-06-13 NOTE — PATIENT INSTRUCTIONS
PEARL DE PARTO   Llame a nuestra oficina al [phone] para cualquiera de los siguientes:    * Necesito llamar inmediatamente yo si tengo incluso shorty pequeña cantidad de LIQUIDO se escapa de mi vagina, con o sin contracciones.   * Keira llamar si tengo SANGRADO shorty cantidad igual o más que un período. Shorty pequeña cantidad de flujo vaginal sangriento es normal al final del embarazo.   * Keira llamar si tengo CONTRACCIONES cada yordy minutos alvaro al menos shorty hora. Necesito un reloj para tiempo. Yo keira tiempo desde el comienzo de shorty contracción hasta el comienzo de la siguiente.   * De ser necesario ANTES DE  ir al hospital.   * Necesito tener un plan para TRANSPORTE a ir al hospital cuando estoy en trabajo de parto. Trabajo generalmente no es shorty emergencia que requiere shorty ambulancia.          CUENTAS DEL RETROCESO FETAL    En el tercer trimestre (después de 28 semanas de gestación) deben realizar patada fetal cuentas cada día. [place] bebé debe moverse al menos 10 veces en 2 horas alvaro un tiempo activo, shorty vez al día.    Elegir el atime del día cuando el bebé es más activo. Trate de hacerlo a la misma hora cada día. Entrar en shorty posición cómoda y luego escribir el tiempo que tu bebé se mueve lucita. Cuenta cada movimiento hasta que el bebé se mueve 10 veces. Estos movimientos incluyen patadas, puñetazos, codazos, revolotea o rollos. [place] puede tardar entre 5 minutos a 2 horas. Anote el tiempo que sientes movimiento 10 del bebé.    Si ha pasado 2 horas y tu bebé no se ha movido al menos 10 veces, usted debe llamar a la oficina de inmediato. [phone]          MASAJE EN LA GYPSY PERINEAL O VAGINAL    Que puedo hacer ahora para reducir las probabilidades de desgarro alvaro el parto?  Masaje alrededor del orificio de la vagina realizado por usted misma (o por herndon miguel ángel).  El masaje en esta gypsy, ya sea antes del parto o alvaro la segunda etapa del parto, puede reducir la probabilidad de desgarro perineal  alvaro el parto.  Asimismo, el uso de compresas tibias en el perineo alvaro la etapa expulsiva del parto puede reducir la pravedad del desparro.  [place] sucedera alvaro la etapa expulsiva del parto.  En casa tambien puede reducir las probabilidades de sufrir lesiones durnate el parto de con masajes en la rosalia perineal.    Quien?  Todas las mujeres embarazadas a partir de, aproximadamente, las 34 semanas de embarazo.    Cuando?  Usted o herndon miguel ángel deben hacer masajes en la rosalia vaginal alvaro 5 a 10 minutos de 1 a 4 veces por semana.    Anna?  Use shorty mezcla de agua y aceite de almendras, floyd u latif con 1 o 2 dedos (adriana la comodidad).  Inserte los dedos en la vagina entre 3 y 5cm.  Aplique presion general hacia abajo y hacia los costados alvaro 5 a 10 minutos entre las 3 y las 9 horas, de 1 a 4 veces por semana.          GROUP B STREP    Group B Strep (GBS) es shorty bacteria vaginal común. Aproximadamente el 25% de las mujeres normalmente tienen la bacteria GBS en la vagina. NO es shorty infección de transmisión sexual. ¡De hecho, no es shorty infección! Es solo shorty bacteria vaginal normal para muchas mujeres.    Sin embargo, la bacteria GBS puede ser peligrosas para un bebé recién nacido si el bebé está expuesto a celestine bacteria particular alvaro el parto y el nacimiento Y desarrolla shorty infección de la bacteria. La probabilidad de shorty infección de GBS en recién nacidos para shorty inge que tiene las bacteria GBS en la vagina es cerca de 1% - 2%. Lyndsay si la infeccion produce, un bebé podría ser gravemente enfermo.    Por esta razón, hacemos un cultivo vaginal (Q-Tip de la vagina y el recto) para todas las mujeres embarazadas en el aproximadamente 36 semanas de embarazo. Si el cultivo demuestra que hay bacteria GBS presente, no es shorty razón para el pánico! Porque en esta situación dará olga antibióticos de la inge a través de herndon IV mientras está en parto. Cuando shorty madre se trata con antibióticos alvaro el parto, herndon  bebé MARK NUNCA se infecta con la bacteria GBS.    Keep maternal Fetal Medicine appointment for Non-stress test, fluid check and Doppler studies and growth scan, next appointment is today at 415 in the [place] office   Return to the office in 1 week  Pt verbalized understanding of all discussed.

## 2024-06-13 NOTE — PROGRESS NOTES
"Assessment & Plan  33 y.o.  at 32w1d presenting for routine prenatal visit.   Pt has a seizure disorder, is taking medication as directed  Pt has IUGR in this pregnancy. PT states at ast [facility] apointmnetshe was told she needed weekly NST, VALENTÍN and Doppler studies, and growth scan pt does not have F/U scheduled. Called PNC to assist with an appointment, pt was provided an appointment today at 415 pm in the PNC in [place] , Next 3 weekly appointments are in the PNC on ,  am,  11 am and  at 0915    Problem List Items Addressed This Visit          Nervous and Auditory    Seizure disorder during pregnancy, antepartum (HCC)       Other Pediatrics    Intrauterine growth restriction (IUGR) affecting care of mother, third trimester, single gestation       Obstetrics/Gynecology    32 weeks gestation of pregnancy     Other Visit Diagnoses       Prenatal care in third trimester    -  Primary          ____________________________________________________________  Subjective  She is without complaint.   She denies contractions, loss of fluid, or vaginal bleeding.   She feels regular fetal movements.     WNL respiratory effort, negative,      Objective  /78 (BP Location: Right arm, Patient Position: Sitting)   Pulse 76   Ht 5' 6\" (1.676 m)   Wt 110 kg (243 lb 9.6 oz)   LMP 2023   BMI 39.32 kg/m²          Patient's Active Problem List  Patient Active Problem List   Diagnosis    Rubella non-immune status, antepartum    Pelvic pain    Obesity in pregnancy, antepartum    History of  delivery, antepartum    Seizure disorder during pregnancy, antepartum (HCC)    Advanced maternal age in multigravida    32 weeks gestation of pregnancy    Intrauterine growth restriction (IUGR) affecting care of mother, third trimester, single gestation     Plan  Keep PNC appointment for NST, VALENTÍN and doppler study, and growth scan as needed  To call with vaginal bleeding, loss of fluid, regular " contractions, decreased fetal movement, other needs or concerns.  Return in 2 weeks  Pt verbalized understanding of all discussed.'

## 2024-06-13 NOTE — LETTER
2024     PILY Reyes  [address] [place], [place] [id number]    Patient: Ruth Self   YOB: 1990   Date of Visit: 2024       Dear MsAngeline Romero:    Thank you for referring Ruth Self to me for evaluation. Below are my notes for this consultation.    If you have questions, please do not hesitate to call me. I look forward to following your patient along with you.         Sincerely,         [place]        CC: No Recipients    Aurelio Leone MD  2024  5:19 PM  Sign when Signing Visit  A fetal ultrasound and NST were completed. See Ob procedures in Epic for an interpretation and recommendations. Do not hesitate to contact us in [facility] if you have questions.    Aurelio Leone MD, MSCE  Maternal Fetal Medicine

## 2024-06-14 DIAGNOSIS — O36.5930 INTRAUTERINE GROWTH RESTRICTION (IUGR) AFFECTING CARE OF MOTHER, THIRD TRIMESTER, SINGLE GESTATION: Primary | ICD-10-CM

## 2024-06-17 ENCOUNTER — ROUTINE PRENATAL (OUTPATIENT)
Dept: OBGYN CLINIC | Facility: CLINIC | Age: 34
End: 2024-06-17

## 2024-06-17 VITALS
BODY MASS INDEX: 39.63 KG/M2 | SYSTOLIC BLOOD PRESSURE: 119 MMHG | DIASTOLIC BLOOD PRESSURE: 79 MMHG | HEART RATE: 97 BPM | WEIGHT: 246.6 LBS | HEIGHT: 66 IN

## 2024-06-17 DIAGNOSIS — Z34.93 PRENATAL CARE IN THIRD TRIMESTER: Primary | ICD-10-CM

## 2024-06-17 DIAGNOSIS — Z3A.32 32 WEEKS GESTATION OF PREGNANCY: ICD-10-CM

## 2024-06-17 DIAGNOSIS — O36.5930 INTRAUTERINE GROWTH RESTRICTION (IUGR) AFFECTING CARE OF MOTHER, THIRD TRIMESTER, SINGLE GESTATION: ICD-10-CM

## 2024-06-17 PROCEDURE — 99213 OFFICE O/P EST LOW 20 MIN: CPT | Performed by: NURSE PRACTITIONER

## 2024-06-17 PROCEDURE — 59025 FETAL NON-STRESS TEST: CPT | Performed by: NURSE PRACTITIONER

## 2024-06-17 NOTE — PATIENT INSTRUCTIONS
Evelyne por herndon confianza en nuestro equipo.   Le agradecemos y agradecemos sarina comentarios.   Si recibe shorty encuesta nuestra, tómese unos momentos para informarnos cómo estamos.   Sinceramente,  AUTUMN RamirezNP       El Tercer Trimestre  (28-42 semanas)   HERNDON BEBÉ   ·        herndon bebé chupa herndon dedo ahora!   ·        herndon bebé puede oír voces y responder al tacto... habla con él o davey!!   ·        el cerebro de herndon bebé crece y se desarrolla más en los últimos 2 meses de embarazo   ·        deanna y huesos del bebé son suaves y flexibles para que quepan por el canal del parto   ·        los movimientos del bebé cambian hacia el final del embarazo porque hay menos espacio para patear y estiramientos en tu vientre   ·        los pulmones del bebé no están completamente desarrollados y totalmente preparados para respirar por sarina propios hasta el último 3-4 semanas antes de herndon fecha de vencimiento     TU CUERPO   ·        herndon vientre está creciendo mucho ahora   ·        será más difícil dormir livan de noche o ser tan activos joon eres generalmente   ·        puede sudar más de lo habitual   ·        serás más desequilibrado... tenga cuidado de no caer!   ·        Usted puede desarrollar hemorroides (qué pueden ser dolorosas y hacen difícil sentarse)   ·        los dos últimos meses del embarazo puede ser muy incómodos con brandy de espalda, brandy de deanna y ardor de estómago   ·        Puedes empezar a tener contracciones... mientras son irregulares y menos de 5 por hora, esto es shorty parte normal de herndon cuerpo a punto de tener un bebé   ·        el stef uterino puede empezar a dilatar y abrir arriba... para estar listo para el parto   ·        Usted puede encontrarse que necesitan hacer orinar muy a menudo... porque el bebé está presionando mucho la vejiga   ·        Usted puede quedarse corta de respiracion más rápido de lo meagan          CUENTAS DEL RETROCESO FETAL    En el tercer trimestre (después de 28  semanas de gestación) deben realizar patada fetal cuentas cada día. Gross bebé debe moverse al menos 10 veces en 2 horas alvaro un tiempo activo, shorty vez al día.    Elegir el atime del día cuando el bebé es más activo. Trate de hacerlo a la misma hora cada día. Entrar en shorty posición cómoda y luego escribir el tiempo que tu bebé se mueve lucita. Cuenta cada movimiento hasta que el bebé se mueve 10 veces. Estos movimientos incluyen patadas, puñetazos, codazos, revolotea o rollos. [place] puede tardar entre 5 minutos a 2 horas. Anote el tiempo que sientes movimiento 10 del bebé.    Si ha pasado 2 horas y tu bebé no se ha movido al menos 10 veces, usted debe llamar a la oficina de inmediato. [phone]          PEARL DE PARTO PREMATURO     Cuando llamar a [phone]:  * Tengo que llamar inmediatamente si tengo incluso shorty pequeña cantidad de LIQUIDO de mi vagina, con o sin contracciones.   * Tengo que llamar si estoy SANGRADO de mi vagina.   * Tengo que llamar si tengo COLICOS que continúa después de beber 2 ó 3 vasos de agua y acostados en mi lado alvaro shorty hora y que se siente joon que estoy teniendo un período.   * Tengo que llamar si siento CONTRACCIONES más de 4 veces en shorty hora quando siento que mi sung se mantiene dura después de que trato de beber 2-3 vasos del agua y acostarme en mi lado alvaro shorty hora.   * Tengo que llamar si cinda un cambio de mi FLUJO vaginal.   * Tengo que llamar si siento la [place] que siente que el bebé aprieta en mi vagina y dura más de shorty hora.   * Tengo que llamar si tengo DOLOR BAJO DE LA ESPALDA que es nueva y está cerca de mi cóccix. Puede entrar y salir varias veces alvaro shorty hora o quedarse allí constantemente.         LA PRE-ECLAMPSIA    ¿Qué es? La preeclampsia es shorty enfermedad grave que puede ocurrir alvaro el embarazo se relaciona con la presión arterial froylan. Le puede pasar a cualquier inge.     ¿Por qué Yes importarme? Las mujeres Care preeclampsia  tienen riesgos graves pueden incluir convulsiones, trazo, daños en los órganos, nacimiento prematuro de herndon bebé. En los peor de los casos, puede causar la muerte de la madre y herndon bebé.     ¿Qué sherrell pagar atención shorty? Signos y síntomas de la preeclampsia pueden incluir:   * Inflamación severa ad de la pancho o las   * Dolor de deanna todavía presente después de anai tomado Tylenol   * Woo manchas o cambios en Lavista   * Dolor en la parte superior del abdomen o hombro   * Navarre náuseas y vómitos (en la segunda mitad del embarazo)   * Aumento de peso repentino   * Dificultad para respirar     ¿Qué sherrell hacer? Si usted experimenta alguno de los síntomas de la preeclampsia, comuníquese con herndon proveedor de OB. Encontrar la preeclampsia temprana es importante para usted y herndon bebé. Llámanos al [phone].          LACTANCIA MATERNA     BENEFICIOS PARA LOS BEBÉS   ·       sistemas inmunológicos más darell (menos alergias, eczema, asma y cáncer infantil)   ·       menos diarrea y estreñimiento u otras enfermedades GI   ·       menos resfriados e infecciones del oído   ·       mejor visión y dientes (menos cavidades)   ·       mejora el IQ   ·       menores tasas de diabetes y obesidad en la infancia     BENEFICIOS PARA LAS MADRES   ·        promueve la pérdida de peso más rápida después del parto   ·        jessica riesgo para la depresión postparto   ·        jessica riesgo para los cánceres de mama, útero y ovario   ·        jessica riesgo para la osteoporosis en desarrollo con la edad   ·        siempre es más fácil que fórmula - correcto, limpio, y la temperatura adecuada   ·        menos costosa que la fórmula es gratis!!!     CLAVES PARA SHORTY LACTANCIA EXITOSA   ·        mantener bebé piel a piel hasta después de la primera alimentación evento   ·        tener a bebé en herndon cuarto con usted alvaro herndon estadía en el hospital después del parto   ·        evitar cualquier botella de alimentación (a menos que sea  médicamente necesario)   ·        limitar el uso de chupones y pañales   ·        Pida ayuda si usted está teniendo problemas... consultores de lactancia (que se especializan en la lactancia) están disponibles para ayudarle a   ·        shorty dieta saludable para mamá... comiendo shorty variedad de alimentos y raciones con moderación     COSAS QUE DEBES SABER SOBRE LA LACTANCIA   ·        mayoría de los medicamentos se considera compatible con la lactancia materna por la [employer], cheikh puedes consultarlo con herndon médico o en lactancia antes de melchor un medicamento nuevo... para estar seguro es seguro   ·        alcohol (cerveza, vino, licor) puede transmitirse de la madre al bebé a través de la leche materna... un ocasional, social bebida es considerado aceptable por la [employer]... más que eso deben evitarse   ·        la lactancia materna es NO es un método para evitar embarazo   ·        nicotina (cigarrillos) puede pasar de la madre al bebé a través de la leche materna... sin embargo, para las madres que fuman, es aún más saludable para amamantar que use la fórmula   ·        cafeína debería limitarse a 1-3 tazas por día... incluye café, refrescos, bebidas energéticas           MASAJE EN LA GYPSY PERINEAL O VAGINAL    Que puedo hacer ahora para reducir las probabilidades de desgarro alvaro el parto?  Masaje alrededor del orificio de la vagina realizado por usted misma (o por herndon miguel ángel).  El masaje en esta gypsy, ya sea antes del parto o alvaro la segunda etapa del parto, puede reducir la probabilidad de desgarro perineal alvaro el parto.  Asimismo, el uso de compresas tibias en el perineo alvaro la etapa expulsiva del parto puede reducir la pravedad del desparro.  [place] sucedera alvaro la etapa expulsiva del parto.  En casa tambien puede reducir las probabilidades de sufrir lesiones durnate el parto de con masajes en la gypsy perineal.    Quien?  Todas las mujeres  embarazadas a partir de, aproximadamente, las 34 semanas de embarazo.    Cuando?  Usted o herndon miguel ángel deben hacer masajes en la rosalia vaginal madai 5 a 10 minutos de 1 a 4 veces por semana.    Anna?  Use shorty mezcla de agua y aceite de almendras, floyd u latif con 1 o 2 dedos (adriana la comodidad).  Inserte los dedos en la vagina entre 3 y 5cm.  Aplique presion general hacia abajo y hacia los costados madai 5 a 10 minutos entre las 3 y las 9 horas, de 1 a 4 veces por semana.          SENALES MADAI EL EMBARAZO  Llame a nuestra oficina al [phone] para cualquiera de los siguientes:    1. Sangrado vaginal  2. Dolor abdominal jai que no desaparece.  3. Fiebre (más de 100.4 y no se mazin con Tylenol)  4. Vómitos persistentes que kang más de 24 horas.  5. dolor de pecho  6. Dolor o ardor al orinar.  7. Dolor de deanna severo que no se resuelve con Tylenol  8. Visión borrosa o eli puntos en herndon visión.  9. Hinchazón repentina de herndon pancho o ad.  10. Enrojecimiento, hinchazón o dolor en shorty pierna.  11. Un aumento de peso repentino en pocos días.  12. Contar los movimientos fetales del bebé. (después de 28 semanas o el sexto mes de embarazo)  13. Shorty pérdida de líquido acuoso de la vagina: puede ser un chorro, un goteo o shorty humedad continua  14. Después de 20 semanas de embarazo, calambres rítmicos (más de 4 por hora) o menstruales anna dolor bajo / pélvico          VACUNAS MADAI EL EMBARAZO    TDAP  La tos ferina (o tos ferina) puede ser grave para cualquier persona, cheikh para herndon recién nacido, puede ser mortal. Hasta 20 recién nacidos mueren cada año en los [place] debido a la tos ferina. Aproximadamente la mitad de los bebés menores de 1 año de edad que contraen tos ferina necesitan tratamiento en el hospital. Cuanto más joven es el bebé cuando él o davey son la tos ferina, más probable es que él o davey tendrá que ser tratado en un hospital. Cuando usted recibe la vacuna contra la tos ferina (Tdap)  alvaro herndon embarazo, herndon cuerpo creará anticuerpos protectores y algunos de ellos pasan a herndon bebé antes de nacer. Estos anticuerpos pueden ayudar a proteger a herndon bebé contraigan la tos ferina hasta que tienen edad suficiente para recibir la vacuna ellos mismos (generalmente alrededor de 6 meses de edad).      INFLUENZA  Cambios en las funciones inmunológica, del corazón y pulmón alvaro el embarazo te hacen más susceptible a padecer seriamente enfermo de la gripe. Coger la gripe también aumenta las posibilidades de problemas graves para herndon bebé en desarrollo, incluyendo la entrega y el trabajo de parto prematuro. Se recomienda que todas las mujeres que están embarazadas alvaro la temporada de gripe deben recibir shorty vacuna contra la influenza.

## 2024-06-17 NOTE — PROGRESS NOTES
Ruth Self presents today for routine OB visit at 32w5d.  Blood Pressure: 119/79  Tt=682 kg (246 lb 9.6 oz); Body mass index is 39.8 kg/m².; TWG=1.179 kg (2 lb 9.6 oz)  Fetal Heart Rate: 135  NST is reactive.  Abdomen: gravid, soft, non-tender.  She reports no complaints.  Denies uterine contractions.  Denies vaginal bleeding or leaking of fluid.  Reports adequate fetal movement of at least 10 movements in 2 hours once daily.  Scheduled for ultrasound 6/20/24.  Reviewed premature labor precautions and fetal kick counts.  Advised to continue medications and return in 1 week.      Current Outpatient Medications   Medication Instructions    aspirin (ECOTRIN LOW STRENGTH) 162 mg, Oral, Daily    folic acid (FOLVITE) 1 mg tablet TOME DOS TABLETAS POR V A ORAL TODOS LOS D AS    levETIRAcetam (KEPPRA) 1,000 mg, Oral, Daily    Prenatal Vit-Fe Fumarate-FA (PRENATAL VITAMINS PO) Oral, Daily

## 2024-06-20 ENCOUNTER — ULTRASOUND (OUTPATIENT)
Age: 34
End: 2024-06-20
Payer: COMMERCIAL

## 2024-06-20 VITALS
DIASTOLIC BLOOD PRESSURE: 66 MMHG | WEIGHT: 248 LBS | HEART RATE: 88 BPM | SYSTOLIC BLOOD PRESSURE: 110 MMHG | HEIGHT: 66 IN | BODY MASS INDEX: 39.86 KG/M2

## 2024-06-20 DIAGNOSIS — Z3A.33 33 WEEKS GESTATION OF PREGNANCY: ICD-10-CM

## 2024-06-20 DIAGNOSIS — O36.5930 INTRAUTERINE GROWTH RESTRICTION (IUGR) AFFECTING CARE OF MOTHER, THIRD TRIMESTER, SINGLE GESTATION: Primary | ICD-10-CM

## 2024-06-20 PROCEDURE — 76818 FETAL BIOPHYS PROFILE W/NST: CPT | Performed by: STUDENT IN AN ORGANIZED HEALTH CARE EDUCATION/TRAINING PROGRAM

## 2024-06-20 PROCEDURE — 76820 UMBILICAL ARTERY ECHO: CPT | Performed by: STUDENT IN AN ORGANIZED HEALTH CARE EDUCATION/TRAINING PROGRAM

## 2024-06-20 NOTE — PROGRESS NOTES
This patient received  care under my supervision on 24 at 33w1d gestational age at [facility].  The note is contained in the ultrasound report located under OB Procedures tab in Epic.  Please call our office at [phone] with questions.  -Isa Alexandre MD

## 2024-06-26 NOTE — PROGRESS NOTES
Please refer to the [facility] ultrasound report in Ob Procedures for additional information regarding today's visit

## 2024-06-27 ENCOUNTER — ULTRASOUND (OUTPATIENT)
Age: 34
End: 2024-06-27
Payer: COMMERCIAL

## 2024-06-27 VITALS
DIASTOLIC BLOOD PRESSURE: 68 MMHG | BODY MASS INDEX: 40.31 KG/M2 | HEIGHT: 66 IN | WEIGHT: 250.8 LBS | SYSTOLIC BLOOD PRESSURE: 122 MMHG | HEART RATE: 74 BPM

## 2024-06-27 DIAGNOSIS — O36.5930 INTRAUTERINE GROWTH RESTRICTION (IUGR) AFFECTING CARE OF MOTHER, THIRD TRIMESTER, SINGLE GESTATION: ICD-10-CM

## 2024-06-27 DIAGNOSIS — Z3A.34 34 WEEKS GESTATION OF PREGNANCY: Primary | ICD-10-CM

## 2024-06-27 PROCEDURE — 76815 OB US LIMITED FETUS(S): CPT | Performed by: OBSTETRICS & GYNECOLOGY

## 2024-06-27 PROCEDURE — 59025 FETAL NON-STRESS TEST: CPT | Performed by: OBSTETRICS & GYNECOLOGY

## 2024-06-27 PROCEDURE — 76820 UMBILICAL ARTERY ECHO: CPT | Performed by: OBSTETRICS & GYNECOLOGY

## 2024-07-01 PROBLEM — Z3A.34 34 WEEKS GESTATION OF PREGNANCY: Status: ACTIVE | Noted: 2024-01-24

## 2024-07-01 NOTE — PROGRESS NOTES
OB/GYN Prenatal Visit    SUBJECTIVE:  Ruth Self is a 33 y.o.  at 34w5d here for prenatal visit.  She has no obstetric complaints and denies pelvic pain, cramping/contractions, vaginal bleeding, loss of fluid, or decreased fetal movement.     OBJECTIVE:  Vitals:    24 0752   BP: 117/79   Pulse: 84       FHT: 140 bpm  Fundal height: 34 cm    OBGyn Exam  Pelvic/breast exam  SVE 0/0/-3    ASSESSMENT / PLAN:    Problem List          Nervous and Auditory    Seizure disorder during pregnancy, antepartum (HCC)    Overview     Currently on Keppra   Follows with Neurology at [facility]   Last seizure 2 years ago   Patient has appointment with [facility] on 24  Currently taking Folate   Keppra level ordered 3/19/24         Current Assessment & Plan     She has Neurology appt on 07/10/24            Blood    Anemia affecting pregnancy in third trimester    Current Assessment & Plan     Hg 11.3,  Explained to the patient the risks of anemia during pregnancy.  Order ferrous sulfate.            Other Pediatrics    Intrauterine growth restriction (IUGR) affecting care of mother, third trimester, single gestation    Overview     Twice weekly NST and weekly VALENTÍN, non-reactive VALENTÍN 2024 in [facility], biophysical profile was 8/8         Current Assessment & Plan     Pt has an appt for ua doppler studies today, continue twice weekly surveillance.  Discussed timing of delivery 38 weeks depending weekly studies.                 Surgery/Wound/Pain    Pelvic pain (Chronic)    Overview     Follow-up urinalysis/urine culture ordered on 3/19/24         History of  delivery, antepartum    Overview     We discussed delivery timing and the patient want to try a vaginal delivery and being induced if not in labor. Also briefly discussed about the risks of induction having a previous caesarian section.            Obstetrics/Gynecology    Rubella non-immune status, antepartum    Overview     Will need MMR postpartum           Obesity in pregnancy, antepartum    Overview     - fetal surveillence to start at 37 weeks gestation [ ]  -Early 1hr gtt wnl           Advanced maternal age in multigravida    34 weeks gestation of pregnancy    Overview     Overview:  Labs  Pap smear 2021, NILM   HIV, RPR, GC/CT, Hep C: negative   Rubella: non-immune  Hep B: immune  Starting Hgb/Plt: 11.9/369  All other prenatal labs WNL   NIPT/MSAFP - low risk, negative screen  28w labs order closer to 28w visit   GBS to be completed around 36w    Vaccines:  Flu vaccine: given   Tdap vaccine: offer around 28w   Contraception: POPs   Delivery plan: TOLAC (1st CS was for breech)  Delivery consent: to be signed at 28w  Ultrasounds:   First trimester: normal NT  Level II: 3/26/24            Other Visit Diagnoses       Prenatal care in third trimester                      Saroj Castellon MD  2024  9:18 AM

## 2024-07-02 ENCOUNTER — ULTRASOUND (OUTPATIENT)
Age: 34
End: 2024-07-02
Payer: COMMERCIAL

## 2024-07-02 ENCOUNTER — ROUTINE PRENATAL (OUTPATIENT)
Dept: OBGYN CLINIC | Facility: CLINIC | Age: 34
End: 2024-07-02

## 2024-07-02 VITALS
WEIGHT: 249 LBS | DIASTOLIC BLOOD PRESSURE: 79 MMHG | SYSTOLIC BLOOD PRESSURE: 117 MMHG | BODY MASS INDEX: 40.02 KG/M2 | HEART RATE: 84 BPM | HEIGHT: 66 IN

## 2024-07-02 VITALS
HEIGHT: 66 IN | HEART RATE: 90 BPM | WEIGHT: 248.4 LBS | DIASTOLIC BLOOD PRESSURE: 78 MMHG | BODY MASS INDEX: 39.92 KG/M2 | SYSTOLIC BLOOD PRESSURE: 114 MMHG

## 2024-07-02 DIAGNOSIS — O99.210 OBESITY IN PREGNANCY, ANTEPARTUM: ICD-10-CM

## 2024-07-02 DIAGNOSIS — Z3A.32 32 WEEKS GESTATION OF PREGNANCY: ICD-10-CM

## 2024-07-02 DIAGNOSIS — O36.5930 INTRAUTERINE GROWTH RESTRICTION (IUGR) AFFECTING CARE OF MOTHER, THIRD TRIMESTER, SINGLE GESTATION: Primary | ICD-10-CM

## 2024-07-02 DIAGNOSIS — Z3A.34 34 WEEKS GESTATION OF PREGNANCY: ICD-10-CM

## 2024-07-02 DIAGNOSIS — O09.523 MULTIGRAVIDA OF ADVANCED MATERNAL AGE IN THIRD TRIMESTER: ICD-10-CM

## 2024-07-02 DIAGNOSIS — Z34.93 PRENATAL CARE IN THIRD TRIMESTER: ICD-10-CM

## 2024-07-02 DIAGNOSIS — O34.219 HISTORY OF CESAREAN DELIVERY, ANTEPARTUM: ICD-10-CM

## 2024-07-02 DIAGNOSIS — O99.350 SEIZURE DISORDER DURING PREGNANCY, ANTEPARTUM (HCC): ICD-10-CM

## 2024-07-02 DIAGNOSIS — O36.5930 INTRAUTERINE GROWTH RESTRICTION (IUGR) AFFECTING CARE OF MOTHER, THIRD TRIMESTER, SINGLE GESTATION: ICD-10-CM

## 2024-07-02 DIAGNOSIS — O99.013 ANEMIA AFFECTING PREGNANCY IN THIRD TRIMESTER: ICD-10-CM

## 2024-07-02 DIAGNOSIS — G40.909 SEIZURE DISORDER DURING PREGNANCY, ANTEPARTUM (HCC): ICD-10-CM

## 2024-07-02 DIAGNOSIS — Z3A.34 34 WEEKS GESTATION OF PREGNANCY: Primary | ICD-10-CM

## 2024-07-02 PROBLEM — O28.8 NON-REACTIVE NST (NON-STRESS TEST): Status: RESOLVED | Noted: 2024-06-13 | Resolved: 2024-07-02

## 2024-07-02 PROCEDURE — 76815 OB US LIMITED FETUS(S): CPT | Performed by: OBSTETRICS & GYNECOLOGY

## 2024-07-02 PROCEDURE — 76820 UMBILICAL ARTERY ECHO: CPT | Performed by: OBSTETRICS & GYNECOLOGY

## 2024-07-02 PROCEDURE — 59025 FETAL NON-STRESS TEST: CPT | Performed by: OBSTETRICS & GYNECOLOGY

## 2024-07-02 PROCEDURE — PNV: Performed by: OBSTETRICS & GYNECOLOGY

## 2024-07-02 NOTE — ASSESSMENT & PLAN NOTE
Pt has an appt for ua doppler studies today, continue twice weekly surveillance.  Discussed timing of delivery 38 weeks depending weekly studies.

## 2024-07-05 DIAGNOSIS — O99.013 ANEMIA AFFECTING PREGNANCY IN THIRD TRIMESTER: ICD-10-CM

## 2024-07-05 RX ORDER — FERROUS FUMARATE 324(106)MG
TABLET ORAL
Qty: 90 TABLET | Refills: 1 | Status: SHIPPED | OUTPATIENT
Start: 2024-07-05

## 2024-07-10 ENCOUNTER — ROUTINE PRENATAL (OUTPATIENT)
Dept: OBGYN CLINIC | Facility: CLINIC | Age: 34
End: 2024-07-10

## 2024-07-10 VITALS
DIASTOLIC BLOOD PRESSURE: 80 MMHG | SYSTOLIC BLOOD PRESSURE: 126 MMHG | HEART RATE: 90 BPM | HEIGHT: 66 IN | BODY MASS INDEX: 39.79 KG/M2 | WEIGHT: 247.6 LBS

## 2024-07-10 DIAGNOSIS — O34.219 HISTORY OF CESAREAN DELIVERY, ANTEPARTUM: ICD-10-CM

## 2024-07-10 DIAGNOSIS — O36.5930 INTRAUTERINE GROWTH RESTRICTION (IUGR) AFFECTING CARE OF MOTHER, THIRD TRIMESTER, SINGLE GESTATION: ICD-10-CM

## 2024-07-10 DIAGNOSIS — O09.523 MULTIGRAVIDA OF ADVANCED MATERNAL AGE IN THIRD TRIMESTER: Primary | ICD-10-CM

## 2024-07-10 DIAGNOSIS — Z3A.36 36 WEEKS GESTATION OF PREGNANCY: ICD-10-CM

## 2024-07-10 PROCEDURE — PNV: Performed by: OBSTETRICS & GYNECOLOGY

## 2024-07-10 PROCEDURE — 87150 DNA/RNA AMPLIFIED PROBE: CPT | Performed by: OBSTETRICS & GYNECOLOGY

## 2024-07-10 NOTE — PROGRESS NOTES
Assessment & Plan  33 y.o.  at 36w0d presenting for routine prenatal visit.       Problem List       Rubella non-immune status, antepartum    Overview     Will need MMR postpartum          Pelvic pain (Chronic)    Overview     Follow-up urinalysis/urine culture ordered on 3/19/24         Obesity in pregnancy, antepartum    Overview     - fetal surveillence to start at 37 weeks gestation [ ]  -Early 1hr gtt wnl           History of  delivery, antepartum    Overview     We discussed delivery timing and the patient want to try a vaginal delivery and being induced if not in labor. Also briefly discussed about the risks of induction having a previous caesarian section.         Seizure disorder during pregnancy, antepartum (HCC)    Overview     Currently on Keppra   Follows with Neurology at [facility]   Last seizure 2 years ago   Patient has appointment with [facility] on 24  Currently taking Folate   Keppra level ordered 3/19/24         Advanced maternal age in multigravida    36 weeks gestation of pregnancy    Overview     Overview:  Labs  Pap smear 2021, NILM   HIV, RPR, GC/CT, Hep C: negative   Rubella: non-immune  Hep B: immune  Starting Hgb/Plt: 11.9/369  All other prenatal labs WNL   NIPT/MSAFP - low risk, negative screen  28w labs order closer to 28w visit   GBS to be completed around 36w    Vaccines:  Flu vaccine: given   Tdap vaccine: offer around 28w   Contraception: POPs   Delivery plan: TOLAC (1st CS was for breech)  Delivery consent: to be signed at 28w  Ultrasounds:   First trimester: normal NT  Level II: 3/26/24           Intrauterine growth restriction (IUGR) affecting care of mother, third trimester, single gestation    Overview     Twice weekly NST and weekly VALENTÍN, non-reactive VALENTÍN 2024 in [facility], biophysical profile was 88  Next growth scan on 24, will determine delivery timing         Anemia affecting pregnancy in third trimester  "    ____________________________________________________________  Subjective  She is without complaint.   She denies contractions, loss of fluid, or vaginal bleeding.   She feels regular fetal movements.       Objective  /80 (BP Location: Left arm, Patient Position: Sitting)   Pulse 90   Ht 5' 6\" (1.676 m)   Wt 112 kg (247 lb 9.6 oz)   LMP 2023   BMI 39.96 kg/m²   FHR: 140's via doppler    Physical Exam  Constitutional:       Appearance: She is well-developed.   Cardiovascular:      Rate and Rhythm: Normal rate and regular rhythm.      Heart sounds: Normal heart sounds. No murmur heard.     No friction rub. No gallop.   Pulmonary:      Effort: Pulmonary effort is normal. No respiratory distress.      Breath sounds: No wheezing.   Abdominal:      Palpations: Abdomen is soft.      Tenderness: There is no abdominal tenderness.   Musculoskeletal:         General: No tenderness.   Neurological:      Mental Status: She is alert and oriented to person, place, and time.   Vitals reviewed.          Patient's Active Problem List  Patient Active Problem List   Diagnosis    Rubella non-immune status, antepartum    Pelvic pain    Obesity in pregnancy, antepartum    History of  delivery, antepartum    Seizure disorder during pregnancy, antepartum (HCC)    Advanced maternal age in multigravida    36 weeks gestation of pregnancy    Intrauterine growth restriction (IUGR) affecting care of mother, third trimester, single gestation    Anemia affecting pregnancy in third trimester           Suhas John MD  7/10/2024  4:24 PM          "

## 2024-07-11 ENCOUNTER — ULTRASOUND (OUTPATIENT)
Age: 34
End: 2024-07-11
Payer: COMMERCIAL

## 2024-07-11 VITALS
HEIGHT: 66 IN | WEIGHT: 246.2 LBS | DIASTOLIC BLOOD PRESSURE: 64 MMHG | SYSTOLIC BLOOD PRESSURE: 122 MMHG | HEART RATE: 81 BPM | BODY MASS INDEX: 39.57 KG/M2

## 2024-07-11 DIAGNOSIS — O36.5930 INTRAUTERINE GROWTH RESTRICTION (IUGR) AFFECTING CARE OF MOTHER, THIRD TRIMESTER, SINGLE GESTATION: Primary | ICD-10-CM

## 2024-07-11 DIAGNOSIS — Z36.89 ENCOUNTER FOR ULTRASOUND TO ASSESS FETAL GROWTH: ICD-10-CM

## 2024-07-11 DIAGNOSIS — Z3A.36 36 WEEKS GESTATION OF PREGNANCY: ICD-10-CM

## 2024-07-11 DIAGNOSIS — O34.219 HISTORY OF CESAREAN DELIVERY, ANTEPARTUM: ICD-10-CM

## 2024-07-11 DIAGNOSIS — O99.350 SEIZURE DISORDER DURING PREGNANCY, ANTEPARTUM (HCC): ICD-10-CM

## 2024-07-11 DIAGNOSIS — G40.909 SEIZURE DISORDER DURING PREGNANCY, ANTEPARTUM (HCC): ICD-10-CM

## 2024-07-11 DIAGNOSIS — O09.523 MULTIGRAVIDA OF ADVANCED MATERNAL AGE IN THIRD TRIMESTER: ICD-10-CM

## 2024-07-11 PROCEDURE — 76820 UMBILICAL ARTERY ECHO: CPT | Performed by: STUDENT IN AN ORGANIZED HEALTH CARE EDUCATION/TRAINING PROGRAM

## 2024-07-11 PROCEDURE — 76816 OB US FOLLOW-UP PER FETUS: CPT | Performed by: STUDENT IN AN ORGANIZED HEALTH CARE EDUCATION/TRAINING PROGRAM

## 2024-07-11 PROCEDURE — 59025 FETAL NON-STRESS TEST: CPT | Performed by: NURSE PRACTITIONER

## 2024-07-11 PROCEDURE — 99213 OFFICE O/P EST LOW 20 MIN: CPT | Performed by: NURSE PRACTITIONER

## 2024-07-11 PROCEDURE — NC001 PR NO CHARGE: Performed by: STUDENT IN AN ORGANIZED HEALTH CARE EDUCATION/TRAINING PROGRAM

## 2024-07-11 NOTE — PROGRESS NOTES
"[facility]: Ms. Self was seen today at 36w1d gestational age for umbilical artery Doppler study, VALENTÍN, NST, and growth ultrasound.  See ultrasound report under \"OB Procedures\" tab.    Herlinda NASCIMENTO      "

## 2024-07-11 NOTE — LETTER
"2024     Suhas John MD  [address] [place] [address]  [address]  [facility] [id number]    Patient: Ruth Self   YOB: 1990   Date of Visit: 2024       Dear Dr. John:    Thank you for referring Ruth Self to me for evaluation. Below are my notes for this consultation.    Ruth's fetal growth restriction is now severe with the EFW at the 1st percentile. Due to this finding, delivery is recommended at exactly 37w0d. She was scheduled for an additional NST in the interim.    Her next appointment with you is not until next week. Can your office reach out to her to schedule induction?    If you have questions, please do not hesitate to call me. I look forward to following your patient along with you.         Sincerely,        Isa Alexandre MD        CC: No Recipients    PILY Dumont  2024  9:37 AM  Attested  [facility]: Ms. Self was seen today at 36w1d gestational age for NST (found under the pregnancy episode) which I reviewed the RN assessment and agree, and fetal growth ultrasound (see ultrasound report under OB procedures tab).  See ultrasound report under \"OB Procedures\" tab.    Herlinda NASCIMENTO      Attestation signed by Isa Alexandre MD at 2024  9:37 AM:  Attending Attestation:  I was the collaborating physician for Ruth Self. I acknowledge the AP's documentation and services provided. I was available in the office, if needed, for consultation.     The ultrasound was interpreted by myself (JEREMY Alexandre). The patient was counseled by PILY Mao.    Isa Alexandre MD 24     "

## 2024-07-12 ENCOUNTER — TELEPHONE (OUTPATIENT)
Dept: OBGYN CLINIC | Facility: CLINIC | Age: 34
End: 2024-07-12

## 2024-07-12 LAB — GP B STREP DNA SPEC QL NAA+PROBE: NEGATIVE

## 2024-07-16 ENCOUNTER — ROUTINE PRENATAL (OUTPATIENT)
Age: 34
End: 2024-07-16
Payer: COMMERCIAL

## 2024-07-16 VITALS
HEIGHT: 66 IN | HEART RATE: 103 BPM | SYSTOLIC BLOOD PRESSURE: 118 MMHG | DIASTOLIC BLOOD PRESSURE: 72 MMHG | BODY MASS INDEX: 39.37 KG/M2 | WEIGHT: 245 LBS

## 2024-07-16 DIAGNOSIS — Z3A.36 36 WEEKS GESTATION OF PREGNANCY: ICD-10-CM

## 2024-07-16 DIAGNOSIS — O36.5930 INTRAUTERINE GROWTH RESTRICTION (IUGR) AFFECTING CARE OF MOTHER, THIRD TRIMESTER, SINGLE GESTATION: Primary | ICD-10-CM

## 2024-07-16 DIAGNOSIS — O99.210 OBESITY IN PREGNANCY, ANTEPARTUM: ICD-10-CM

## 2024-07-16 PROCEDURE — 59025 FETAL NON-STRESS TEST: CPT | Performed by: STUDENT IN AN ORGANIZED HEALTH CARE EDUCATION/TRAINING PROGRAM

## 2024-07-16 NOTE — PROGRESS NOTES
Repeat Non-Stress Testing:    Patient verbalizes +FM. Pt denies ALL:               Leaking of fluid   Contractions   Vaginal bleeding   Decreased fetal movement    Patient is performing daily kick counts. Patient has no questions or concerns.   NST strip reviewed by Dr. Dr. Alexandre.

## 2024-07-17 ENCOUNTER — ROUTINE PRENATAL (OUTPATIENT)
Dept: OBGYN CLINIC | Facility: CLINIC | Age: 34
End: 2024-07-17

## 2024-07-17 ENCOUNTER — ANESTHESIA EVENT (INPATIENT)
Dept: LABOR AND DELIVERY | Facility: HOSPITAL | Age: 34
End: 2024-07-17
Payer: COMMERCIAL

## 2024-07-17 ENCOUNTER — HOSPITAL ENCOUNTER (INPATIENT)
Facility: HOSPITAL | Age: 34
LOS: 3 days | Discharge: HOME | End: 2024-07-20
Attending: OBSTETRICS & GYNECOLOGY | Admitting: OBSTETRICS & GYNECOLOGY
Payer: COMMERCIAL

## 2024-07-17 VITALS
DIASTOLIC BLOOD PRESSURE: 80 MMHG | BODY MASS INDEX: 38.8 KG/M2 | HEIGHT: 66 IN | WEIGHT: 241.4 LBS | HEART RATE: 85 BPM | SYSTOLIC BLOOD PRESSURE: 119 MMHG

## 2024-07-17 DIAGNOSIS — O36.5930 INTRAUTERINE GROWTH RESTRICTION (IUGR) AFFECTING CARE OF MOTHER, THIRD TRIMESTER, SINGLE GESTATION: ICD-10-CM

## 2024-07-17 DIAGNOSIS — O99.210 OBESITY IN PREGNANCY, ANTEPARTUM: ICD-10-CM

## 2024-07-17 DIAGNOSIS — Z30.09 ENCOUNTER FOR COUNSELING REGARDING CONTRACEPTION: ICD-10-CM

## 2024-07-17 DIAGNOSIS — O34.219 HISTORY OF CESAREAN DELIVERY, ANTEPARTUM: Primary | ICD-10-CM

## 2024-07-17 DIAGNOSIS — O09.523 MULTIGRAVIDA OF ADVANCED MATERNAL AGE IN THIRD TRIMESTER: Primary | ICD-10-CM

## 2024-07-17 DIAGNOSIS — Z3A.37 37 WEEKS GESTATION OF PREGNANCY: ICD-10-CM

## 2024-07-17 LAB
ABO GROUP BLD: NORMAL
BLD GP AB SCN SERPL QL: NEGATIVE
ERYTHROCYTE [DISTWIDTH] IN BLOOD BY AUTOMATED COUNT: 13.2 % (ref 11.6–15.1)
HCT VFR BLD AUTO: 36.6 % (ref 34.8–46.1)
HGB BLD-MCNC: 12 G/DL (ref 11.5–15.4)
HOLD SPECIMEN: YES
MCH RBC QN AUTO: 30.7 PG (ref 26.8–34.3)
MCHC RBC AUTO-ENTMCNC: 32.8 G/DL (ref 31.4–37.4)
MCV RBC AUTO: 94 FL (ref 82–98)
PLATELET # BLD AUTO: 315 THOUSANDS/UL (ref 149–390)
PMV BLD AUTO: 10.3 FL (ref 8.9–12.7)
RBC # BLD AUTO: 3.91 MILLION/UL (ref 3.81–5.12)
RH BLD: POSITIVE
SPECIMEN EXPIRATION DATE: NORMAL
TREPONEMA PALLIDUM IGG+IGM AB [PRESENCE] IN SERUM OR PLASMA BY IMMUNOASSAY: NORMAL
WBC # BLD AUTO: 9.89 THOUSAND/UL (ref 4.31–10.16)

## 2024-07-17 PROCEDURE — 86901 BLOOD TYPING SEROLOGIC RH(D): CPT

## 2024-07-17 PROCEDURE — 85027 COMPLETE CBC AUTOMATED: CPT

## 2024-07-17 PROCEDURE — PNV: Performed by: OBSTETRICS & GYNECOLOGY

## 2024-07-17 PROCEDURE — NC001 PR NO CHARGE: Performed by: OBSTETRICS & GYNECOLOGY

## 2024-07-17 PROCEDURE — 86780 TREPONEMA PALLIDUM: CPT

## 2024-07-17 PROCEDURE — 4A1HXCZ MONITORING OF PRODUCTS OF CONCEPTION, CARDIAC RATE, EXTERNAL APPROACH: ICD-10-PCS | Performed by: OBSTETRICS & GYNECOLOGY

## 2024-07-17 PROCEDURE — 86850 RBC ANTIBODY SCREEN: CPT

## 2024-07-17 PROCEDURE — 86900 BLOOD TYPING SEROLOGIC ABO: CPT

## 2024-07-17 RX ORDER — LEVETIRACETAM 500 MG/1
1000 TABLET ORAL DAILY
Status: DISCONTINUED | OUTPATIENT
Start: 2024-07-18 | End: 2024-07-17

## 2024-07-17 RX ORDER — SODIUM CHLORIDE, SODIUM LACTATE, POTASSIUM CHLORIDE, CALCIUM CHLORIDE 600; 310; 30; 20 MG/100ML; MG/100ML; MG/100ML; MG/100ML
125 INJECTION, SOLUTION INTRAVENOUS CONTINUOUS
Status: DISCONTINUED | OUTPATIENT
Start: 2024-07-17 | End: 2024-07-18

## 2024-07-17 RX ORDER — LEVETIRACETAM 500 MG/1
1000 TABLET ORAL DAILY
Status: DISCONTINUED | OUTPATIENT
Start: 2024-07-17 | End: 2024-07-18

## 2024-07-17 RX ORDER — ONDANSETRON 2 MG/ML
4 INJECTION INTRAMUSCULAR; INTRAVENOUS EVERY 8 HOURS PRN
Status: DISCONTINUED | OUTPATIENT
Start: 2024-07-17 | End: 2024-07-18

## 2024-07-17 RX ORDER — CEFAZOLIN SODIUM 2 G/50ML
2000 SOLUTION INTRAVENOUS ONCE
Status: COMPLETED | OUTPATIENT
Start: 2024-07-17 | End: 2024-07-18

## 2024-07-17 RX ADMIN — LEVETIRACETAM 1000 MG: 500 TABLET, FILM COATED ORAL at 19:41

## 2024-07-17 RX ADMIN — SODIUM CHLORIDE, SODIUM LACTATE, POTASSIUM CHLORIDE, AND CALCIUM CHLORIDE 125 ML/HR: .6; .31; .03; .02 INJECTION, SOLUTION INTRAVENOUS at 18:30

## 2024-07-17 NOTE — ASSESSMENT & PLAN NOTE
MEASUREMENTS (* Included In Average GA)     AC             27.69 cm        31 weeks 6 days* (<1%)  BPD             8.37 cm        33 weeks 5 days* (5%)  HC             30.46 cm        33 weeks 6 days* (<1%)  Femur           6.48 cm        33 weeks 3 days* (2%)     EFW [place] 4   2018 grams - 4 lbs 7 oz                 (1%)    Normal VALENTÍN and dopplers    Per [facility], appropriate to deliver at 37 weeks

## 2024-07-17 NOTE — H&P
H & P- Obstetrics   Ruth Self 33 y.o. female MRN: [id number]  Unit/Bed#:  309-01 Encounter: [id number]    Visit conducted with [place] Kony  [id number]    Assessment/Plan:    Ruth is a 33 y.o.  at 37w0d admitted for a repeat  in setting of severe FGR.    SVE:  0/0/-3    Anemia affecting pregnancy in third trimester  Assessment & Plan  Last Hgb 11.3, on PO iron supplementation  Continue iron supplementation  Follow up admission Hgb    Seizure disorder during pregnancy, antepartum ([facility])  Assessment & Plan  Currently on Keppra   Follows with Neurology at [facility]   Last seizure 2 years ago   Currently taking Folate        History of  delivery, antepartum  Assessment & Plan  Discussed and counseled on risks and benefits of repeat C/S vs TOLAC. Given prior c/s for breech presentation and other risk factors such as severe IUGR and unfavorable cervix, pt was quoted 48% success for TOLAC. We discussed risks of TOLAC including uterine rupture, fetal intolerance, need for emergency ceserean section. After discussing with her partner, she decided to choose a repeat   Admit to OBGYN  CBC, RPR, Blood Type & Screen  3.  Anesthesia consult for spinal  4.  Ancef  2 gm for ppx  5.  NPO   6.  To OR for  delivery in morning as she ate prior to presentation  7.  Pt would like POP for contraception after delivery      Rubella non-immune status, antepartum  Assessment & Plan  Plan to  and offer MMR postpartum    * Intrauterine growth restriction (IUGR) affecting care of mother, third trimester, single gestation  Assessment & Plan  MEASUREMENTS (* Included In Average GA)     AC             27.69 cm        31 weeks 6 days* (<1%)  BPD             8.37 cm        33 weeks 5 days* (5%)  HC             30.46 cm        33 weeks 6 days* (<1%)  Femur           6.48 cm        33 weeks 3 days* (2%)     EFW Hadlock 4   2018 grams - 4 lbs 7 oz                 (1%)    Normal VALENTÍN and  dopplers    Per [facility], appropriate to deliver at 37 weeks         Patient of: [facility]  This patient will be an INPATIENT  and I certify the anticipated length of stay is >2 Midnights  Discussed with Dr. Beckham      SUBJECTIVE:    Chief Complaint: none I am here for a repeat     HPI: Ruth Self is a 33 y.o.  with an POLO of 2024, by Last Menstrual Period at 37w0d who is being admitted for repeat . She complains of uterine contractions, occurring every 4-5 minutes and denies fever, has no LOF, and reports no VB. She states she has felt good FM.   This pregnancy is complicated by severe FGR with EFW of 1%, seizure disorder currently on keppra and supplemental folate, hx of 1 LTCS for breech, rubella NI, obesity, anemia with last Hgb of 11.3. All other review of systems is negative.       Pregnancy Plan:  Pregnancy: [place]  Fetal sex: Female  Support person: Power otero     Delivery Plans  Deliver by GA (weeks): 40  Planned delivery method: TOLAC  Planned delivery location: [place] L&[facility]  Planned anesthesia: Epidural  Acceptable blood products: All     Post-Delivery Plans  Feeding intentions: Breast Milk and Non-human milk substitute  Planned birth control: Female Sterilization      Patient Active Problem List   Diagnosis    Rubella non-immune status, antepartum    Pelvic pain    Obesity in pregnancy, antepartum    History of  delivery, antepartum    Seizure disorder during pregnancy, antepartum (HCC)    Advanced maternal age in multigravida    37 weeks gestation of pregnancy    Intrauterine growth restriction (IUGR) affecting care of mother, third trimester, single gestation    Anemia affecting pregnancy in third trimester       OB History    Para Term  AB Living   3 1 1 0 1 1   SAB IAB Ectopic Multiple Live Births   1 0 0 0 1      # Outcome Date GA Lbr Beto/2nd Weight Sex Type Anes PTL Lv   3 Current            2 SAB  2022           1 Term 16 39w0d  2863 g (6 lb 5 oz) F CS-LTranv Spinal N NADEEM       Past Medical History:   Diagnosis Date    Obesity affecting pregnancy in third trimester     Seizures (HCC)        Past Surgical History:   Procedure Laterality Date    ANKLE SURGERY Right      SECTION      OR  DELIVERY ONLY N/A 2016    Procedure:  SECTION ();  Surgeon: Jaylon Granados MD;  Location: [facility];  Service: Obstetrics       Social History     Tobacco Use    Smoking status: Never     Passive exposure: Never    Smokeless tobacco: Never   Substance Use Topics    Alcohol use: No       No Known Allergies      Medications Prior to Admission:     Ferrous Fumarate 324 (106 Fe) MG TABS    folic acid (FOLVITE) 1 mg tablet    levETIRAcetam (KEPPRA) 500 mg tablet    Prenatal Vit-Fe Fumarate-FA (PRENATAL VITAMINS PO)    aspirin (ECOTRIN LOW STRENGTH) 81 mg EC tablet        OBJECTIVE:  Vitals:  Temp:  [98.1 °F (36.7 °C)-98.6 °F (37 °C)] 98.1 °F (36.7 °C)  HR:  [69-85] 69  Resp:  [16-18] 16  BP: (119-133)/(71-80) 133/71  Body mass index is 38.9 kg/m².     Physical Exam:  General: Well appearing, no distress  Respiratory: Unlabored breathing, lungs clear to auscultation  Cardiovascular: Regular rate and rhythm  Abdomen: Soft, gravid, nontender  Fundal Height: Appropriate for gestational age.  Extremities: Warm and well perfused.  Non tender.  Psychiatric: Behavioral normal      FHT:  Baseline Rate (FHR): 140 bpm  Variability: Moderate  Accelerations: 15 x 15 or greater  Decelerations: None    TOCO:   Contraction Frequency (minutes): 2-5  Contraction Duration (seconds): 60-80  Contraction Intensity: Mild      Prenatal Labs:   Blood Type:   Lab Results   Component Value Date/Time    ABO Grouping O 2024 05:35 PM   D (Rh type):   Lab Results   Component Value Date/Time    Rh Factor Positive 2024 05:35 PM   Antibody Screen: negative  HCT/HGB:   Lab Results   Component Value  "Date/Time    Hematocrit 36.6 07/17/2024 05:35 PM    Hematocrit 37.5 05/10/2018 03:04 AM    Hemoglobin 12.0 07/17/2024 05:35 PM    Hemoglobin 12.9 05/10/2018 03:04 AM   MCV:   Lab Results   Component Value Date/Time    MCV 94 07/17/2024 05:35 PM    MCV 91 05/10/2018 03:04 AM   Platelets:   Lab Results   Component Value Date/Time    Platelets 315 07/17/2024 05:35 PM    Platelets 339 05/10/2018 03:04 AM   1 hour Glucola:   Lab Results   Component Value Date/Time    Glucose 137 (H) 05/15/2024 11:59 AM   3 hour Glucola: fasting 75, 1hr 125, 2hr 134, 3hr 105  Rubella:   Lab Results   Component Value Date/Time    Rubella IgG Quant 10.3 (L) 12/21/2023 01:52 PM   VDRL/RPR: non-reactive  Urine Culture/Screen:   Lab Results   Component Value Date/Time    Urine Culture 20,000-29,000 cfu/ml Diphtheroids 03/19/2024 08:35 AM   Hep B:   Lab Results   Component Value Date/Time    Hepatitis B Surface Ag Non-reactive 12/21/2023 01:52 PM   Hep C: non-reactive  HIV: non-reactive  Chlamydia: negative  Gonorrhea:   Lab Results   Component Value Date/Time    N gonorrhoeae, DNA Probe Negative 01/23/2024 09:31 AM    N gonorrhoeae, DNA Probe N. gonorrhoeae Amplified DNA Negative 01/21/2016 03:47 PM   Group B Strep:    Lab Results   Component Value Date/Time    Strep Grp B PCR Negative 07/10/2024 12:03 PM    Strep Grp B PCR Negative for Beta Hemolytic Strep Grp B by PCR 07/14/2016 05:34 PM        Lauryn Burgos, MS4    Joy Chung MD  7/17/2024  8:09 PM        Portions of the record may have been created with voice recognition software.  Occasional wrong word or \"sound a like\" substitutions may have occurred due to the inherent limitations of voice recognition software.  Read the chart carefully and recognize, using context, where substitutions have occurred    "

## 2024-07-17 NOTE — PLAN OF CARE
Problem: PAIN - ADULT  Goal: Verbalizes/displays adequate comfort level or baseline comfort level  Description: Interventions:  - Encourage patient to monitor pain and request assistance  - Assess pain using appropriate pain scale  - Administer analgesics based on type and severity of pain and evaluate response  - Implement non-pharmacological measures as appropriate and evaluate response  - Consider cultural and social influences on pain and pain management  - Notify physician/advanced practitioner if interventions unsuccessful or patient reports new pain  Outcome: Progressing     Problem: INFECTION - ADULT  Goal: Absence or prevention of progression during hospitalization  Description: INTERVENTIONS:  - Assess and monitor for signs and symptoms of infection  - Monitor lab/diagnostic results  - Monitor all insertion sites, i.e. indwelling lines, tubes, and drains  - Monitor endotracheal if appropriate and nasal secretions for changes in amount and color  - [place] appropriate cooling/warming therapies per order  - Administer medications as ordered  - Instruct and encourage patient and family to use good hand hygiene technique  - Identify and instruct in appropriate isolation precautions for identified infection/condition  Outcome: Progressing  Goal: Absence of fever/infection during neutropenic period  Description: INTERVENTIONS:  - Monitor WBC    Outcome: Progressing     Problem: SAFETY ADULT  Goal: Patient will remain free of falls  Description: INTERVENTIONS:  - Educate patient/family on patient safety including physical limitations  - Instruct patient to call for assistance with activity   - Consult OT/PT to assist with strengthening/mobility   - Keep Call bell within reach  - Keep bed low and locked with side rails adjusted as appropriate  - Keep care items and personal belongings within reach  - Initiate and maintain comfort rounds  - Make Fall Risk Sign visible to staff  - Apply yellow socks and bracelet  for high fall risk patients  - Consider moving patient to room near nurses station  Outcome: Progressing  Goal: Maintain or return to baseline ADL function  Description: INTERVENTIONS:  -  Assess patient's ability to carry out ADLs; assess patient's baseline for ADL function and identify physical deficits which impact ability to perform ADLs (bathing, care of mouth/teeth, toileting, grooming, dressing, etc.)  - Assess/evaluate cause of self-care deficits   - Assess range of motion  - Assess patient's mobility; develop plan if impaired  - Assess patient's need for assistive devices and provide as appropriate  - Encourage maximum independence but intervene and supervise when necessary  - Involve family in performance of ADLs  - Assess for home care needs following discharge   - Consider OT consult to assist with ADL evaluation and planning for discharge  - Provide patient education as appropriate  Outcome: Progressing  Goal: Maintains/Returns to pre admission functional level  Description: INTERVENTIONS:  - Perform AM-PAC 6 Click Basic Mobility/ Daily Activity assessment daily.  - Set and communicate daily mobility goal to care team and patient/family/caregiver.   - Collaborate with rehabilitation services on mobility goals if consulted  -- Out of bed for toileting  - Record patient progress and toleration of activity level   Outcome: Progressing     Problem: DISCHARGE PLANNING  Goal: Discharge to home or other facility with appropriate resources  Description: INTERVENTIONS:  - Identify barriers to discharge w/patient and caregiver  - Arrange for needed discharge resources and transportation as appropriate  - Identify discharge learning needs (meds, wound care, etc.)  - Arrange for interpretive services to assist at discharge as needed  - Refer to Case Management Department for coordinating discharge planning if the patient needs post-hospital services based on physician/advanced practitioner order or complex needs  related to functional status, cognitive ability, or social support system  Outcome: Progressing     Problem: Knowledge Deficit  Goal: Patient/family/caregiver demonstrates understanding of disease process, treatment plan, medications, and discharge instructions  Description: Complete learning assessment and assess knowledge base.  Interventions:  - Provide teaching at level of understanding  - Provide teaching via preferred learning methods  Outcome: Progressing

## 2024-07-17 NOTE — PROGRESS NOTES
Assessment & Plan  33 y.o.  at 37w0d presenting for routine prenatal visit.       Problem List       Rubella non-immune status, antepartum    Overview     Will need MMR postpartum          Pelvic pain (Chronic)    Overview     Follow-up urinalysis/urine culture ordered on 3/19/24         Obesity in pregnancy, antepartum    Overview     - fetal surveillence to start at 37 weeks gestation [ ]  -Early 1hr gtt wnl           History of  delivery, antepartum    Overview     We discussed delivery timing and the patient want to try a vaginal delivery and being induced if not in labor. Also briefly discussed about the risks of induction having a previous caesarian section.         Seizure disorder during pregnancy, antepartum (HCC)    Overview     Currently on Keppra   Follows with Neurology at [facility]   Last seizure 2 years ago   Patient has appointment with [facility] on 24  Currently taking Folate   Keppra level ordered 3/19/24         Advanced maternal age in multigravida    37 weeks gestation of pregnancy    Overview     Overview:  Labs  Pap smear 2021, NILM   HIV, RPR, GC/CT, Hep C: negative   Rubella: non-immune  Hep B: immune  Starting Hgb/Plt: 11.9/369  All other prenatal labs WNL   NIPT/MSAFP - low risk, negative screen  28w labs order closer to 28w visit   GBS to be completed around 36w    Vaccines:  Flu vaccine: given   Tdap vaccine: offer around 28w   Contraception: POPs   Delivery plan: TOLAC (1st CS was for breech)  Delivery consent: to be signed at 28w  Ultrasounds:   First trimester: normal NT  Level II: 3/26/24           Intrauterine growth restriction (IUGR) affecting care of mother, third trimester, single gestation    Overview     Twice weekly NST and weekly VALENTÍN, non-reactive VALENTÍN 2024 in [facility], biophysical profile was 824 EFW 1%  Plan for induction 24         Anemia affecting pregnancy in third trimester      ____________________________________________________________  Subjective  She is without complaint.   She denies contractions, loss of fluid, or vaginal bleeding.   She feels regular fetal movements.       Objective  LMP 2023   FHR: 140's via doppler    Physical Exam  Constitutional:       Appearance: She is well-developed.   Cardiovascular:      Rate and Rhythm: Normal rate and regular rhythm.      Heart sounds: Normal heart sounds. No murmur heard.     No friction rub. No gallop.   Pulmonary:      Effort: Pulmonary effort is normal. No respiratory distress.      Breath sounds: No wheezing.   Abdominal:      Palpations: Abdomen is soft.      Tenderness: There is no abdominal tenderness.   Musculoskeletal:         General: No tenderness.   Neurological:      Mental Status: She is alert and oriented to person, place, and time.   Vitals reviewed.        Patient's Active Problem List  Patient Active Problem List   Diagnosis    Rubella non-immune status, antepartum    Pelvic pain    Obesity in pregnancy, antepartum    History of  delivery, antepartum    Seizure disorder during pregnancy, antepartum (HCC)    Advanced maternal age in multigravida    37 weeks gestation of pregnancy    Intrauterine growth restriction (IUGR) affecting care of mother, third trimester, single gestation    Anemia affecting pregnancy in third trimester           Suhas John MD  2024  3:27 PM

## 2024-07-18 ENCOUNTER — ANESTHESIA (INPATIENT)
Dept: LABOR AND DELIVERY | Facility: HOSPITAL | Age: 34
End: 2024-07-18
Payer: COMMERCIAL

## 2024-07-18 PROBLEM — Z98.891 STATUS POST REPEAT LOW TRANSVERSE CESAREAN SECTION: Status: ACTIVE | Noted: 2024-01-24

## 2024-07-18 LAB
BASE EXCESS BLDCOA CALC-SCNC: -2.9 MMOL/L (ref 3–11)
BASE EXCESS BLDCOV CALC-SCNC: -3 MMOL/L (ref 1–9)
HCO3 BLDCOA-SCNC: 25.7 MMOL/L (ref 17.3–27.3)
HCO3 BLDCOV-SCNC: 24.7 MMOL/L (ref 12.2–28.6)
OXYHGB MFR BLDCOA: 5.1 %
OXYHGB MFR BLDCOV: 11 %
PCO2 BLDCOA: 63.1 MM[HG] (ref 30–60)
PCO2 BLDCOV: 56 MM HG (ref 27–43)
PH BLDCOA: 7.23 [PH] (ref 7.23–7.43)
PH BLDCOV: 7.26 [PH] (ref 7.19–7.49)
PO2 BLDCOA: <10 MM HG (ref 5–25)
PO2 BLDCOV: <10 MM HG (ref 15–45)
SAO2 % BLDCOV: <10 ML/DL

## 2024-07-18 PROCEDURE — 94762 N-INVAS EAR/PLS OXIMTRY CONT: CPT

## 2024-07-18 PROCEDURE — 88307 TISSUE EXAM BY PATHOLOGIST: CPT | Performed by: SPECIALIST

## 2024-07-18 PROCEDURE — 82805 BLOOD GASES W/O2 SATURATION: CPT | Performed by: OBSTETRICS & GYNECOLOGY

## 2024-07-18 RX ORDER — METOCLOPRAMIDE HYDROCHLORIDE 5 MG/ML
INJECTION INTRAMUSCULAR; INTRAVENOUS AS NEEDED
Status: DISCONTINUED | OUTPATIENT
Start: 2024-07-18 | End: 2024-07-18

## 2024-07-18 RX ORDER — METOCLOPRAMIDE HYDROCHLORIDE 5 MG/ML
INJECTION INTRAMUSCULAR; INTRAVENOUS
Status: COMPLETED
Start: 2024-07-18 | End: 2024-07-18

## 2024-07-18 RX ORDER — OXYCODONE HYDROCHLORIDE 5 MG/1
10 TABLET ORAL EVERY 4 HOURS PRN
Status: DISCONTINUED | OUTPATIENT
Start: 2024-07-19 | End: 2024-07-20 | Stop reason: HOSPADM

## 2024-07-18 RX ORDER — KETOROLAC TROMETHAMINE 30 MG/ML
INJECTION, SOLUTION INTRAMUSCULAR; INTRAVENOUS AS NEEDED
Status: DISCONTINUED | OUTPATIENT
Start: 2024-07-18 | End: 2024-07-18

## 2024-07-18 RX ORDER — DEXAMETHASONE SODIUM PHOSPHATE 10 MG/ML
INJECTION, SOLUTION INTRAMUSCULAR; INTRAVENOUS AS NEEDED
Status: DISCONTINUED | OUTPATIENT
Start: 2024-07-18 | End: 2024-07-18

## 2024-07-18 RX ORDER — LEVETIRACETAM 500 MG/1
1000 TABLET ORAL DAILY
Status: DISCONTINUED | OUTPATIENT
Start: 2024-07-18 | End: 2024-07-20 | Stop reason: HOSPADM

## 2024-07-18 RX ORDER — DOCUSATE SODIUM 100 MG/1
100 CAPSULE, LIQUID FILLED ORAL 2 TIMES DAILY
Status: DISCONTINUED | OUTPATIENT
Start: 2024-07-18 | End: 2024-07-20

## 2024-07-18 RX ORDER — PHENYLEPHRINE HYDROCHLORIDE 10 MG/ML
INJECTION INTRAVENOUS
Status: DISPENSED
Start: 2024-07-18 | End: 2024-07-18

## 2024-07-18 RX ORDER — HYDROMORPHONE HCL/PF 1 MG/ML
0.5 SYRINGE (ML) INJECTION EVERY 2 HOUR PRN
Status: ACTIVE | OUTPATIENT
Start: 2024-07-18 | End: 2024-07-19

## 2024-07-18 RX ORDER — BUPIVACAINE HYDROCHLORIDE 7.5 MG/ML
INJECTION, SOLUTION INTRASPINAL AS NEEDED
Status: DISCONTINUED | OUTPATIENT
Start: 2024-07-18 | End: 2024-07-18

## 2024-07-18 RX ORDER — KETOROLAC TROMETHAMINE 30 MG/ML
30 INJECTION, SOLUTION INTRAMUSCULAR; INTRAVENOUS EVERY 6 HOURS SCHEDULED
Status: DISPENSED | OUTPATIENT
Start: 2024-07-18 | End: 2024-07-20

## 2024-07-18 RX ORDER — OXYCODONE HYDROCHLORIDE 5 MG/1
5 TABLET ORAL EVERY 4 HOURS PRN
Status: DISCONTINUED | OUTPATIENT
Start: 2024-07-19 | End: 2024-07-20 | Stop reason: HOSPADM

## 2024-07-18 RX ORDER — MORPHINE SULFATE 0.5 MG/ML
INJECTION, SOLUTION EPIDURAL; INTRATHECAL; INTRAVENOUS
Status: COMPLETED
Start: 2024-07-18 | End: 2024-07-18

## 2024-07-18 RX ORDER — MORPHINE SULFATE 0.5 MG/ML
INJECTION, SOLUTION EPIDURAL; INTRATHECAL; INTRAVENOUS AS NEEDED
Status: DISCONTINUED | OUTPATIENT
Start: 2024-07-18 | End: 2024-07-18

## 2024-07-18 RX ORDER — ACETAMINOPHEN 325 MG/1
975 TABLET ORAL EVERY 6 HOURS SCHEDULED
Status: DISCONTINUED | OUTPATIENT
Start: 2024-07-18 | End: 2024-07-20 | Stop reason: HOSPADM

## 2024-07-18 RX ORDER — ACETAMINOPHEN 325 MG/1
650 TABLET ORAL EVERY 6 HOURS PRN
Status: DISCONTINUED | OUTPATIENT
Start: 2024-07-18 | End: 2024-07-19

## 2024-07-18 RX ORDER — DEXAMETHASONE SODIUM PHOSPHATE 10 MG/ML
INJECTION, SOLUTION INTRAMUSCULAR; INTRAVENOUS
Status: COMPLETED
Start: 2024-07-18 | End: 2024-07-18

## 2024-07-18 RX ORDER — KETOROLAC TROMETHAMINE 30 MG/ML
INJECTION, SOLUTION INTRAMUSCULAR; INTRAVENOUS
Status: COMPLETED
Start: 2024-07-18 | End: 2024-07-19

## 2024-07-18 RX ORDER — ONDANSETRON 2 MG/ML
INJECTION INTRAMUSCULAR; INTRAVENOUS AS NEEDED
Status: DISCONTINUED | OUTPATIENT
Start: 2024-07-18 | End: 2024-07-18

## 2024-07-18 RX ORDER — ONDANSETRON 2 MG/ML
INJECTION INTRAMUSCULAR; INTRAVENOUS
Status: COMPLETED
Start: 2024-07-18 | End: 2024-07-18

## 2024-07-18 RX ORDER — SODIUM CHLORIDE, SODIUM LACTATE, POTASSIUM CHLORIDE, CALCIUM CHLORIDE 600; 310; 30; 20 MG/100ML; MG/100ML; MG/100ML; MG/100ML
125 INJECTION, SOLUTION INTRAVENOUS CONTINUOUS
Status: DISCONTINUED | OUTPATIENT
Start: 2024-07-18 | End: 2024-07-20

## 2024-07-18 RX ORDER — OXYTOCIN/RINGER'S LACTATE 30/500 ML
PLASTIC BAG, INJECTION (ML) INTRAVENOUS
Status: COMPLETED
Start: 2024-07-18 | End: 2024-07-18

## 2024-07-18 RX ORDER — PHENYLEPHRINE HCL IN 0.9% NACL 1 MG/10 ML
SYRINGE (ML) INTRAVENOUS
Status: DISPENSED
Start: 2024-07-18 | End: 2024-07-18

## 2024-07-18 RX ORDER — IBUPROFEN 600 MG/1
600 TABLET ORAL EVERY 6 HOURS
Status: DISCONTINUED | OUTPATIENT
Start: 2024-07-20 | End: 2024-07-20 | Stop reason: HOSPADM

## 2024-07-18 RX ORDER — OXYCODONE HYDROCHLORIDE 5 MG/1
5 TABLET ORAL EVERY 4 HOURS PRN
Status: ACTIVE | OUTPATIENT
Start: 2024-07-18 | End: 2024-07-19

## 2024-07-18 RX ORDER — CALCIUM CARBONATE 500 MG/1
1000 TABLET, CHEWABLE ORAL DAILY PRN
Status: DISCONTINUED | OUTPATIENT
Start: 2024-07-18 | End: 2024-07-20 | Stop reason: HOSPADM

## 2024-07-18 RX ORDER — OXYTOCIN/RINGER'S LACTATE 30/500 ML
62.5 PLASTIC BAG, INJECTION (ML) INTRAVENOUS ONCE
Status: DISCONTINUED | OUTPATIENT
Start: 2024-07-18 | End: 2024-07-20

## 2024-07-18 RX ORDER — NALOXONE HYDROCHLORIDE 0.4 MG/ML
0.1 INJECTION, SOLUTION INTRAMUSCULAR; INTRAVENOUS; SUBCUTANEOUS
Status: ACTIVE | OUTPATIENT
Start: 2024-07-18 | End: 2024-07-19

## 2024-07-18 RX ORDER — OXYTOCIN/RINGER'S LACTATE 30/500 ML
PLASTIC BAG, INJECTION (ML) INTRAVENOUS CONTINUOUS PRN
Status: DISCONTINUED | OUTPATIENT
Start: 2024-07-18 | End: 2024-07-18

## 2024-07-18 RX ORDER — ENOXAPARIN SODIUM 100 MG/ML
40 INJECTION SUBCUTANEOUS DAILY
Status: DISCONTINUED | OUTPATIENT
Start: 2024-07-19 | End: 2024-07-20 | Stop reason: HOSPADM

## 2024-07-18 RX ORDER — PHENYLEPHRINE HCL IN 0.9% NACL 1 MG/10 ML
SYRINGE (ML) INTRAVENOUS
Status: DISPENSED
Start: 2024-07-18 | End: 2024-07-19

## 2024-07-18 RX ADMIN — DEXAMETHASONE SODIUM PHOSPHATE 10 MG: 10 INJECTION INTRAMUSCULAR; INTRAVENOUS at 12:17

## 2024-07-18 RX ADMIN — METOCLOPRAMIDE 10 MG: 5 INJECTION, SOLUTION INTRAMUSCULAR; INTRAVENOUS at 12:17

## 2024-07-18 RX ADMIN — SODIUM CHLORIDE, SODIUM LACTATE, POTASSIUM CHLORIDE, AND CALCIUM CHLORIDE 1000 ML: .6; .31; .03; .02 INJECTION, SOLUTION INTRAVENOUS at 19:41

## 2024-07-18 RX ADMIN — DOCUSATE SODIUM 100 MG: 100 CAPSULE, LIQUID FILLED ORAL at 18:46

## 2024-07-18 RX ADMIN — SODIUM CHLORIDE, SODIUM LACTATE, POTASSIUM CHLORIDE, AND CALCIUM CHLORIDE 125 ML/HR: .6; .31; .03; .02 INJECTION, SOLUTION INTRAVENOUS at 03:05

## 2024-07-18 RX ADMIN — ONDANSETRON 4 MG: 2 INJECTION INTRAMUSCULAR; INTRAVENOUS at 12:17

## 2024-07-18 RX ADMIN — SODIUM CHLORIDE, SODIUM LACTATE, POTASSIUM CHLORIDE, AND CALCIUM CHLORIDE 125 ML/HR: .6; .31; .03; .02 INJECTION, SOLUTION INTRAVENOUS at 12:00

## 2024-07-18 RX ADMIN — MORPHINE SULFATE 0.2 MG: 0.5 INJECTION, SOLUTION EPIDURAL; INTRATHECAL; INTRAVENOUS at 12:16

## 2024-07-18 RX ADMIN — BUPIVACAINE HYDROCHLORIDE IN DEXTROSE 1.6 ML: 7.5 INJECTION, SOLUTION SUBARACHNOID at 12:16

## 2024-07-18 RX ADMIN — Medication 500 MILLI-UNITS/MIN: at 12:47

## 2024-07-18 RX ADMIN — ACETAMINOPHEN 650 MG: 325 TABLET, FILM COATED ORAL at 18:46

## 2024-07-18 RX ADMIN — KETOROLAC TROMETHAMINE 30 MG: 30 INJECTION, SOLUTION INTRAMUSCULAR; INTRAVENOUS at 13:35

## 2024-07-18 RX ADMIN — CEFAZOLIN SODIUM 2000 MG: 2 SOLUTION INTRAVENOUS at 12:04

## 2024-07-18 RX ADMIN — SODIUM CHLORIDE, SODIUM LACTATE, POTASSIUM CHLORIDE, AND CALCIUM CHLORIDE 125 ML/HR: .6; .31; .03; .02 INJECTION, SOLUTION INTRAVENOUS at 07:04

## 2024-07-18 RX ADMIN — LEVETIRACETAM 1000 MG: 500 TABLET, FILM COATED ORAL at 18:47

## 2024-07-18 NOTE — PLAN OF CARE
Problem: PAIN - ADULT  Goal: Verbalizes/displays adequate comfort level or baseline comfort level  Description: Interventions:  - Encourage patient to monitor pain and request assistance  - Assess pain using appropriate pain scale  - Administer analgesics based on type and severity of pain and evaluate response  - Implement non-pharmacological measures as appropriate and evaluate response  - Consider cultural and social influences on pain and pain management  - Notify physician/advanced practitioner if interventions unsuccessful or patient reports new pain  Outcome: Progressing     Problem: INFECTION - ADULT  Goal: Absence or prevention of progression during hospitalization  Description: INTERVENTIONS:  - Assess and monitor for signs and symptoms of infection  - Monitor lab/diagnostic results  - Monitor all insertion sites, i.e. indwelling lines, tubes, and drains  - Monitor endotracheal if appropriate and nasal secretions for changes in amount and color  - [place] appropriate cooling/warming therapies per order  - Administer medications as ordered  - Instruct and encourage patient and family to use good hand hygiene technique  - Identify and instruct in appropriate isolation precautions for identified infection/condition  Outcome: Progressing  Goal: Absence of fever/infection during neutropenic period  Description: INTERVENTIONS:  - Monitor WBC    Outcome: Progressing     Problem: SAFETY ADULT  Goal: Patient will remain free of falls  Description: INTERVENTIONS:  - Educate patient/family on patient safety including physical limitations  - Instruct patient to call for assistance with activity   - Consult OT/PT to assist with strengthening/mobility   - Keep Call bell within reach  - Keep bed low and locked with side rails adjusted as appropriate  - Keep care items and personal belongings within reach  - Initiate and maintain comfort rounds  - Make Fall Risk Sign visible to staff  - Apply yellow socks and bracelet  for high fall risk patients  - Consider moving patient to room near nurses station  Outcome: Progressing  Goal: Maintain or return to baseline ADL function  Description: INTERVENTIONS:  -  Assess patient's ability to carry out ADLs; assess patient's baseline for ADL function and identify physical deficits which impact ability to perform ADLs (bathing, care of mouth/teeth, toileting, grooming, dressing, etc.)  - Assess/evaluate cause of self-care deficits   - Assess range of motion  - Assess patient's mobility; develop plan if impaired  - Assess patient's need for assistive devices and provide as appropriate  - Encourage maximum independence but intervene and supervise when necessary  - Involve family in performance of ADLs  - Assess for home care needs following discharge   - Consider OT consult to assist with ADL evaluation and planning for discharge  - Provide patient education as appropriate  Outcome: Progressing  Goal: Maintains/Returns to pre admission functional level  Description: INTERVENTIONS:  - Perform AM-PAC 6 Click Basic Mobility/ Daily Activity assessment daily.  - Set and communicate daily mobility goal to care team and patient/family/caregiver.   - Collaborate with rehabilitation services on mobility goals if consulted  - Out of bed for toileting  - Record patient progress and toleration of activity level   Outcome: Progressing     Problem: DISCHARGE PLANNING  Goal: Discharge to home or other facility with appropriate resources  Description: INTERVENTIONS:  - Identify barriers to discharge w/patient and caregiver  - Arrange for needed discharge resources and transportation as appropriate  - Identify discharge learning needs (meds, wound care, etc.)  - Arrange for interpretive services to assist at discharge as needed  - Refer to Case Management Department for coordinating discharge planning if the patient needs post-hospital services based on physician/advanced practitioner order or complex needs  related to functional status, cognitive ability, or social support system  Outcome: Progressing     Problem: Knowledge Deficit  Goal: Patient/family/caregiver demonstrates understanding of disease process, treatment plan, medications, and discharge instructions  Description: Complete learning assessment and assess knowledge base.  Interventions:  - Provide teaching at level of understanding  - Provide teaching via preferred learning methods  Outcome: Progressing     Problem: BIRTH - VAGINAL/ SECTION  Goal: Fetal and maternal status remain reassuring during the birth process  Description: INTERVENTIONS:  - Monitor vital signs  - Monitor fetal heart rate  - Monitor uterine activity  - Monitor labor progression (vaginal delivery)  - DVT prophylaxis  - Antibiotic prophylaxis  Outcome: Progressing  Goal: Emotionally satisfying birthing experience for mother/fetus  Description: Interventions:  - Assess, plan, implement and evaluate the nursing care given to the patient in labor  - Advocate the philosophy that each childbirth experience is a unique experience and support the family's chosen level of involvement and control during the labor process   - Actively participate in both the patient's and family's teaching of the birth process  - Consider cultural, Synagogue and age-specific factors and plan care for the patient in labor  Outcome: Progressing

## 2024-07-18 NOTE — ANESTHESIA PROCEDURE NOTES
Spinal Block    Patient location during procedure: OR  Start time: 7/18/2024 12:20 PM  Reason for block: procedure for pain, at surgeon's request, post-op pain management and primary anesthetic  Staffing  Performed by: Layne Harper MD  Authorized by: Layne Harper MD    Preanesthetic Checklist  Completed: patient identified, IV checked, site marked, risks and benefits discussed, surgical consent, monitors and equipment checked, pre-op evaluation and timeout performed  Spinal Block  Patient position: sitting  Prep: ChloraPrep and site prepped and draped  Patient monitoring: frequent blood pressure checks, continuous pulse ox and heart rate  Approach: midline  Location: L3-4  Needle  Needle type: Pencan   Needle gauge: 24 G  Needle length: 4 in  Assessment  Sensory level: T4  Injection Assessment:  negative aspiration for heme, no paresthesia on injection and positive aspiration for clear CSF.  Post-procedure:  site cleaned  Additional Notes  No issues, two pass 2/2 os and scoliosis

## 2024-07-18 NOTE — DISCHARGE SUMMARY
Obstetrics Discharge Summary   Ruth Self 33 y.o. female MRN: [id number]  Unit/Bed#: -01 Encounter: [id number]    Admission Date: 2024     Discharge Date: 24    Admitting Diagnoses:   Pregnancy at 37w1d gestational age   Severe fetal growth restriction   History of Epilepsy   Anemia  Obesity        Discharge Diagnoses:   Same, delivered    Procedures:   repeat  section, low transverse incision      Admitting Attending: Dr. Bray  Delivery Attending: Dr. Bray  Discharge Attending: Dr. Dc    Hospital Course:   Ruth Self is now a 33 y.o.  who was initially admitted at 37w1d for a repeat  section delivery due to severe fetal growth restriction.     She underwent an uncomplicated repeat low transverse  section delivery on 2024 and delivered a viable female  at 1245. APGARS were 8, 9 at 1 and 5 minutes, respectively. 's birth weight was 5lb 0.1oz. Placenta delivered without difficulty.   was admitted to the  nursery. Patient tolerated the procedure well and was transferred to recovery in stable condition.     The patient's post partum course was unremarkable.. Her preoperative hemoglobin was 12.0g/dL, postoperative was 10.6. Her postoperative pain was well controlled with oral analgesics.    On day of discharge, she was ambulating and able to reasonably perform all ADLs. She was voiding and had appropriate bowel function. Pain was well controlled. She was discharged home on post-operative day #2 without complications. Patient was instructed to follow up with her OB as an outpatient and was given appropriate warnings to call provider if she develops signs of infection or uncontrolled pain. She is breast feeding .  Mom's blood type is O positive, RhoGAM was not indicated.      Complications:   None apparent    Condition at discharge:   good     Provisions for Follow-Up Care:  See after visit summary for  information related to follow-up care and any pertinent home health orders.      Disposition:   Home    Planned Readmission:   No    Discharge Medications:   Please see AVS for a complete list of discharge medications.    Discharge instructions :   Please see AVS for complete discharge instructions.    Sandra Quinonez, DO   Obstetrics & Gynecology PGY-II  07/20/24  3:09 PM         I personally saw and evaluated the patient and agree with the Resident's plan of care for discharge.  All questions and concerns the patient (and her family) had were addressed to her (their) satisfaction.       MIGUELANGEL Dc MD, FACOG

## 2024-07-18 NOTE — PLAN OF CARE
Problem: PAIN - ADULT  Goal: Verbalizes/displays adequate comfort level or baseline comfort level  Description: Interventions:  - Encourage patient to monitor pain and request assistance  - Assess pain using appropriate pain scale  - Administer analgesics based on type and severity of pain and evaluate response  - Implement non-pharmacological measures as appropriate and evaluate response  - Consider cultural and social influences on pain and pain management  - Notify physician/advanced practitioner if interventions unsuccessful or patient reports new pain  Outcome: Progressing     Problem: INFECTION - ADULT  Goal: Absence or prevention of progression during hospitalization  Description: INTERVENTIONS:  - Assess and monitor for signs and symptoms of infection  - Monitor lab/diagnostic results  - Monitor all insertion sites, i.e. indwelling lines, tubes, and drains  - Monitor endotracheal if appropriate and nasal secretions for changes in amount and color  - [place] appropriate cooling/warming therapies per order  - Administer medications as ordered  - Instruct and encourage patient and family to use good hand hygiene technique  - Identify and instruct in appropriate isolation precautions for identified infection/condition  Outcome: Progressing  Goal: Absence of fever/infection during neutropenic period  Description: INTERVENTIONS:  - Monitor WBC    Outcome: Progressing     Problem: SAFETY ADULT  Goal: Patient will remain free of falls  Description: INTERVENTIONS:  - Educate patient/family on patient safety including physical limitations  - Instruct patient to call for assistance with activity   - Consult OT/PT to assist with strengthening/mobility   - Keep Call bell within reach  - Keep bed low and locked with side rails adjusted as appropriate  - Keep care items and personal belongings within reach  - Initiate and maintain comfort rounds  Outcome: Progressing  Goal: Maintain or return to baseline ADL  function  Description: INTERVENTIONS:  -  Assess patient's ability to carry out ADLs; assess patient's baseline for ADL function and identify physical deficits which impact ability to perform ADLs (bathing, care of mouth/teeth, toileting, grooming, dressing, etc.)  - Assess/evaluate cause of self-care deficits   - Assess range of motion  - Assess patient's mobility; develop plan if impaired  - Assess patient's need for assistive devices and provide as appropriate  - Encourage maximum independence but intervene and supervise when necessary  - Involve family in performance of ADLs  - Assess for home care needs following discharge   - Consider OT consult to assist with ADL evaluation and planning for discharge  - Provide patient education as appropriate  Outcome: Progressing  Goal: Maintains/Returns to pre admission functional level  Description: INTERVENTIONS:  - Perform AM-PAC 6 Click Basic Mobility/ Daily Activity assessment daily.  - Set and communicate daily mobility goal to care team and patient/family/caregiver.   - Collaborate with rehabilitation services on mobility goals if consulted  -- Ambulate patient 3 times a day  - Out of bed for meals 3 times a day  - Out of bed for toileting  - Record patient progress and toleration of activity level   Outcome: Progressing     Problem: DISCHARGE PLANNING  Goal: Discharge to home or other facility with appropriate resources  Description: INTERVENTIONS:  - Identify barriers to discharge w/patient and caregiver  - Arrange for needed discharge resources and transportation as appropriate  - Identify discharge learning needs (meds, wound care, etc.)  - Arrange for interpretive services to assist at discharge as needed  - Refer to Case Management Department for coordinating discharge planning if the patient needs post-hospital services based on physician/advanced practitioner order or complex needs related to functional status, cognitive ability, or social support  system  Outcome: Progressing     Problem: Knowledge Deficit  Goal: Patient/family/caregiver demonstrates understanding of disease process, treatment plan, medications, and discharge instructions  Description: Complete learning assessment and assess knowledge base.  Interventions:  - Provide teaching at level of understanding  - Provide teaching via preferred learning methods  Outcome: Progressing     Problem: BIRTH - VAGINAL/ SECTION  Goal: Fetal and maternal status remain reassuring during the birth process  Description: INTERVENTIONS:  - Monitor vital signs  - Monitor fetal heart rate  - Monitor uterine activity  - Monitor labor progression (vaginal delivery)  - DVT prophylaxis  - Antibiotic prophylaxis  Outcome: Progressing  Goal: Emotionally satisfying birthing experience for mother/fetus  Description: Interventions:  - Assess, plan, implement and evaluate the nursing care given to the patient in labor  - Advocate the philosophy that each childbirth experience is a unique experience and support the family's chosen level of involvement and control during the labor process   - Actively participate in both the patient's and family's teaching of the birth process  - Consider cultural, Yarsanism and age-specific factors and plan care for the patient in labor  Outcome: Progressing

## 2024-07-18 NOTE — PLAN OF CARE
Problem: PAIN - ADULT  Goal: Verbalizes/displays adequate comfort level or baseline comfort level  Description: Interventions:  - Encourage patient to monitor pain and request assistance  - Assess pain using appropriate pain scale  - Administer analgesics based on type and severity of pain and evaluate response  - Implement non-pharmacological measures as appropriate and evaluate response  - Consider cultural and social influences on pain and pain management  - Notify physician/advanced practitioner if interventions unsuccessful or patient reports new pain  Outcome: Progressing     Problem: INFECTION - ADULT  Goal: Absence or prevention of progression during hospitalization  Description: INTERVENTIONS:  - Assess and monitor for signs and symptoms of infection  - Monitor lab/diagnostic results  - Monitor all insertion sites, i.e. indwelling lines, tubes, and drains  - Monitor endotracheal if appropriate and nasal secretions for changes in amount and color  - [place] appropriate cooling/warming therapies per order  - Administer medications as ordered  - Instruct and encourage patient and family to use good hand hygiene technique  - Identify and instruct in appropriate isolation precautions for identified infection/condition  Outcome: Progressing  Goal: Absence of fever/infection during neutropenic period  Description: INTERVENTIONS:  - Monitor WBC    Outcome: Progressing     Problem: SAFETY ADULT  Goal: Patient will remain free of falls  Description: INTERVENTIONS:  - Educate patient/family on patient safety including physical limitations  - Instruct patient to call for assistance with activity   - Consult OT/PT to assist with strengthening/mobility   - Keep Call bell within reach  - Keep bed low and locked with side rails adjusted as appropriate  - Keep care items and personal belongings within reach  - Initiate and maintain comfort rounds  - Make Fall Risk Sign visible to staff  - Offer Toileting every  Hours,  in advance of need  - Initiate/Maintain alarm  - Obtain necessary fall risk management equipment:   - Apply yellow socks and bracelet for high fall risk patients  - Consider moving patient to room near nurses station  Outcome: Progressing  Goal: Maintain or return to baseline ADL function  Description: INTERVENTIONS:  -  Assess patient's ability to carry out ADLs; assess patient's baseline for ADL function and identify physical deficits which impact ability to perform ADLs (bathing, care of mouth/teeth, toileting, grooming, dressing, etc.)  - Assess/evaluate cause of self-care deficits   - Assess range of motion  - Assess patient's mobility; develop plan if impaired  - Assess patient's need for assistive devices and provide as appropriate  - Encourage maximum independence but intervene and supervise when necessary  - Involve family in performance of ADLs  - Assess for home care needs following discharge   - Consider OT consult to assist with ADL evaluation and planning for discharge  - Provide patient education as appropriate  Outcome: Progressing  Goal: Maintains/Returns to pre admission functional level  Description: INTERVENTIONS:  - Perform AM-PAC 6 Click Basic Mobility/ Daily Activity assessment daily.  - Set and communicate daily mobility goal to care team and patient/family/caregiver.   - Collaborate with rehabilitation services on mobility goals if consulted  - Perform Range of Motion  times a day.  - Reposition patient every  hours.  - Dangle patient  times a day  - Stand patient  times a day  - Ambulate patient  times a day  - Out of bed to chair  times a day   - Out of bed for meals  times a day  - Out of bed for toileting  - Record patient progress and toleration of activity level   Outcome: Progressing     Problem: DISCHARGE PLANNING  Goal: Discharge to home or other facility with appropriate resources  Description: INTERVENTIONS:  - Identify barriers to discharge w/patient and caregiver  - Arrange for  needed discharge resources and transportation as appropriate  - Identify discharge learning needs (meds, wound care, etc.)  - Arrange for interpretive services to assist at discharge as needed  - Refer to Case Management Department for coordinating discharge planning if the patient needs post-hospital services based on physician/advanced practitioner order or complex needs related to functional status, cognitive ability, or social support system  Outcome: Progressing     Problem: Knowledge Deficit  Goal: Patient/family/caregiver demonstrates understanding of disease process, treatment plan, medications, and discharge instructions  Description: Complete learning assessment and assess knowledge base.  Interventions:  - Provide teaching at level of understanding  - Provide teaching via preferred learning methods  Outcome: Progressing     Problem: POSTPARTUM  Goal: Experiences normal postpartum course  Description: INTERVENTIONS:  - Monitor maternal vital signs  - Assess uterine involution and lochia  Outcome: Progressing  Goal: Appropriate maternal -  bonding  Description: INTERVENTIONS:  - Identify family support  - Assess for appropriate maternal/infant bonding   -Encourage maternal/infant bonding opportunities  - Referral to  or  as needed  Outcome: Progressing  Goal: Establishment of infant feeding pattern  Description: INTERVENTIONS:  - Assess breast/bottle feeding  - Refer to lactation as needed  Outcome: Progressing  Goal: Incision(s), wounds(s) or drain site(s) healing without S/S of infection  Description: INTERVENTIONS  - Assess and document dressing, incision, wound bed, drain sites and surrounding tissue  - Provide patient and family education  - Perform skin care/dressing changes every   Outcome: Progressing

## 2024-07-18 NOTE — PLAN OF CARE
Problem: PAIN - ADULT  Goal: Verbalizes/displays adequate comfort level or baseline comfort level  Description: Interventions:  - Encourage patient to monitor pain and request assistance  - Assess pain using appropriate pain scale  - Administer analgesics based on type and severity of pain and evaluate response  - Implement non-pharmacological measures as appropriate and evaluate response  - Consider cultural and social influences on pain and pain management  - Notify physician/advanced practitioner if interventions unsuccessful or patient reports new pain  7/18/2024 1750 by Cynthia Markle, RN  Outcome: Progressing  7/18/2024 0740 by Cynthia Markle, RN  Outcome: Progressing     Problem: INFECTION - ADULT  Goal: Absence or prevention of progression during hospitalization  Description: INTERVENTIONS:  - Assess and monitor for signs and symptoms of infection  - Monitor lab/diagnostic results  - Monitor all insertion sites, i.e. indwelling lines, tubes, and drains  - Monitor endotracheal if appropriate and nasal secretions for changes in amount and color  - [place] appropriate cooling/warming therapies per order  - Administer medications as ordered  - Instruct and encourage patient and family to use good hand hygiene technique  - Identify and instruct in appropriate isolation precautions for identified infection/condition  7/18/2024 1750 by Cynthia Markle, RN  Outcome: Progressing  7/18/2024 0740 by Cynthia Markle, RN  Outcome: Progressing  Goal: Absence of fever/infection during neutropenic period  Description: INTERVENTIONS:  - Monitor WBC    7/18/2024 1750 by Cynthia Markle, RN  Outcome: Progressing  7/18/2024 0740 by Cynthia Markle, RN  Outcome: Progressing     Problem: SAFETY ADULT  Goal: Maintain or return to baseline ADL function  Description: INTERVENTIONS:  -  Assess patient's ability to carry out ADLs; assess patient's baseline for ADL function and identify physical deficits which impact ability to perform  ADLs (bathing, care of mouth/teeth, toileting, grooming, dressing, etc.)  - Assess/evaluate cause of self-care deficits   - Assess range of motion  - Assess patient's mobility; develop plan if impaired  - Assess patient's need for assistive devices and provide as appropriate  - Encourage maximum independence but intervene and supervise when necessary  - Involve family in performance of ADLs  - Assess for home care needs following discharge   - Consider OT consult to assist with ADL evaluation and planning for discharge  - Provide patient education as appropriate  7/18/2024 1750 by Cynthia Markle, RN  Outcome: Progressing  7/18/2024 0740 by Cynthia Markle, RN  Outcome: Progressing     Problem: DISCHARGE PLANNING  Goal: Discharge to home or other facility with appropriate resources  Description: INTERVENTIONS:  - Identify barriers to discharge w/patient and caregiver  - Arrange for needed discharge resources and transportation as appropriate  - Identify discharge learning needs (meds, wound care, etc.)  - Arrange for interpretive services to assist at discharge as needed  - Refer to Case Management Department for coordinating discharge planning if the patient needs post-hospital services based on physician/advanced practitioner order or complex needs related to functional status, cognitive ability, or social support system  7/18/2024 1750 by Cynthia Markle, RN  Outcome: Progressing  7/18/2024 0740 by Cynthia Markle, RN  Outcome: Progressing     Problem: Knowledge Deficit  Goal: Patient/family/caregiver demonstrates understanding of disease process, treatment plan, medications, and discharge instructions  Description: Complete learning assessment and assess knowledge base.  Interventions:  - Provide teaching at level of understanding  - Provide teaching via preferred learning methods  7/18/2024 1750 by Cynthia Markle, RN  Outcome: Progressing  7/18/2024 0740 by Cynthia Markle, RN  Outcome: Progressing     Problem:  BIRTH - VAGINAL/ SECTION  Goal: Fetal and maternal status remain reassuring during the birth process  Description: INTERVENTIONS:  - Monitor vital signs  - Monitor fetal heart rate  - Monitor uterine activity  - Monitor labor progression (vaginal delivery)  - DVT prophylaxis  - Antibiotic prophylaxis  2024 by Cynthia Markle, RN  Outcome: Completed  2024 by Cynthia Markle, RN  Outcome: Progressing  Goal: Emotionally satisfying birthing experience for mother/fetus  Description: Interventions:  - Assess, plan, implement and evaluate the nursing care given to the patient in labor  - Advocate the philosophy that each childbirth experience is a unique experience and support the family's chosen level of involvement and control during the labor process   - Actively participate in both the patient's and family's teaching of the birth process  - Consider cultural, Islam and age-specific factors and plan care for the patient in labor  2024 by Cynthia Markle, RN  Outcome: Completed  2024 by Cynthia Markle, RN  Outcome: Progressing     Problem: POSTPARTUM  Goal: Experiences normal postpartum course  Description: INTERVENTIONS:  - Monitor maternal vital signs  - Assess uterine involution and lochia  Outcome: Progressing  Goal: Appropriate maternal -  bonding  Description: INTERVENTIONS:  - Identify family support  - Assess for appropriate maternal/infant bonding   -Encourage maternal/infant bonding opportunities  - Referral to  or  as needed  Outcome: Progressing  Goal: Establishment of infant feeding pattern  Description: INTERVENTIONS:  - Assess breast/bottle feeding  - Refer to lactation as needed  Outcome: Progressing  Goal: Incision(s), wounds(s) or drain site(s) healing without S/S of infection  Description: INTERVENTIONS  - Assess and document dressing, incision, wound bed, drain sites and surrounding tissue  - Provide patient and family  education  - Perform skin care/dressing changes every day  Outcome: Progressing      LABS:                        11.1   8.46  )-----------( 264      ( 06 May 2018 07:08 )             34.2     05-06    143  |  110<H>  |  12  ----------------------------<  84  4.0   |  28  |  0.87    Ca    8.5      06 May 2018 07:08  Phos  3.2     05-05  Mg     2.1     05-05    TPro  6.9  /  Alb  3.4  /  TBili  0.2  /  DBili  x   /  AST  18  /  ALT  15  /  AlkPhos  61  05-06        CAPILLARY BLOOD GLUCOSE

## 2024-07-18 NOTE — ANESTHESIA PREPROCEDURE EVALUATION
Procedure:   SECTION () REPEAT (Uterus)    Relevant Problems   ANESTHESIA (within normal limits)      GYN   (+) 37 weeks gestation of pregnancy   (+) Advanced maternal age in multigravida      HEMATOLOGY   (+) Anemia affecting pregnancy in third trimester      NEURO/PSYCH   (+) Seizure disorder during pregnancy, antepartum (HCC)      Obstetrics/Gynecology   (+) Intrauterine growth restriction (IUGR) affecting care of mother, third trimester, single gestation   (+) Obesity in pregnancy, antepartum        Physical Exam    Airway    Mallampati score: III  TM Distance: >3 FB  Neck ROM: full     Dental   No notable dental hx     Cardiovascular  Rhythm: regular, Rate: normal, Cardiovascular exam normal    Pulmonary  Pulmonary exam normal Breath sounds clear to auscultation    Other Findings  post-pubertal.      Anesthesia Plan  ASA Score- 3     Anesthesia Type- spinal with ASA Monitors.         Additional Monitors:     Airway Plan:            Plan Factors-    Chart reviewed.   Existing labs reviewed. Patient summary reviewed.    Patient is not a current smoker.              Induction-     Postoperative Plan-     Perioperative Resuscitation Plan - Level 1 - Full Code.       Informed Consent- Anesthetic plan and risks discussed with patient.

## 2024-07-18 NOTE — PROGRESS NOTES
OBGYN Progress Note - Antepartum  Ruth Self 33 y.o. female MRN: [id number]  Unit/Bed#:  309-01 Encounter: [id number]    Assessment/Plan:  33 y.o.  at 37w1d admitted for RLTCS in the setting of severe FGR. Hospital day 2. By problem:    History of  delivery, antepartum  Assessment & Plan  Discussed and counseled on risks and benefits of repeat C/S vs TOLAC. Given prior c/s for breech presentation and other risk factors such as severe IUGR and unfavorable cervix, pt was quoted 48% success for TOLAC. We discussed risks of TOLAC including uterine rupture, fetal intolerance, need for emergency ceserean section. After discussing with her partner, she decided to choose a repeat   Admit to OBGYN  CBC, RPR, Blood Type & Screen  3.  Anesthesia consult for spinal  4.  Ancef  2 gm for ppx  5.  NPO   6.  To OR for  delivery in morning as she ate prior to presentation  7.  Pt would like POP for contraception after delivery      Anemia affecting pregnancy in third trimester  Assessment & Plan  Last Hgb 11.3, on PO iron supplementation  Continue iron supplementation  Admission Hgb 12.0    Seizure disorder during pregnancy, antepartum ([facility])  Assessment & Plan  Currently on Keppra   Follows with [facility]rology at [facility]   Last seizure 2 years ago   Currently taking Folate        Rubella non-immune status, antepartum  Assessment & Plan  Plan to  and offer MMR postpartum    * Intrauterine growth restriction (IUGR) affecting care of mother, third trimester, single gestation  Assessment & Plan  MEASUREMENTS (* Included In Average GA)     AC             27.69 cm        31 weeks 6 days* (<1%)  BPD             8.37 cm        33 weeks 5 days* (5%)  HC             30.46 cm        33 weeks 6 days* (<1%)  Femur           6.48 cm        33 weeks 3 days* (2%)     EFW [place] 4   2018 grams - 4 lbs 7 oz                 (1%)    Normal VALENTÍN and dopplers    Per [facility], appropriate to deliver at 37  "weeks          Subjective/Objective   Chief Complaint: \"I am feeling well, ready for my surgery\"    Subjective:   Contractions: no  Loss of fluid: no  Vaginal bleeding: no  Fetal movement: yes    Pain: no  Tolerating PO: yes  Voiding: yes  Ambulating: yes  Headaches: no  Visual changes: no  Chest pain: no  Shortness of breath: no  Nausea: no  Vomiting/Diarrhea: no  Dysuria: no  Leg pain: no    Objective:   Vitals:   Temp:  [97.7 °F (36.5 °C)-98.6 °F (37 °C)] 97.7 °F (36.5 °C)  HR:  [66-89] 66  Resp:  [16-18] 16  BP: (106-133)/(60-80) 131/62     Physical Exam  Vitals and nursing note reviewed. Exam conducted with a chaperone present.   Constitutional:       General: She is not in acute distress.  HENT:      Head: Normocephalic.      Right Ear: External ear normal.      Left Ear: External ear normal.   Eyes:      General: No scleral icterus.        Right eye: No discharge.         Left eye: No discharge.      Conjunctiva/sclera: Conjunctivae normal.   Cardiovascular:      Rate and Rhythm: Normal rate and regular rhythm.      Pulses: Normal pulses.      Heart sounds: Normal heart sounds.   Pulmonary:      Effort: Pulmonary effort is normal. No respiratory distress.      Breath sounds: Normal breath sounds.   Abdominal:      Palpations: Abdomen is soft.      Tenderness: There is no abdominal tenderness. There is no guarding.      Comments: Gravid uterus   Musculoskeletal:         General: No swelling or tenderness. Normal range of motion.      Cervical back: Normal range of motion.      Right lower leg: No edema.      Left lower leg: No edema.   Skin:     General: Skin is warm and dry.      Capillary Refill: Capillary refill takes less than 2 seconds.   Neurological:      Mental Status: She is alert and oriented to person, place, and time. Mental status is at baseline.   Psychiatric:         Mood and Affect: Mood normal.         Behavior: Behavior normal.         Fetal Assessment:  FHT: 140 bpm baseline / Moderate 6 - " 25 bpm / 15 x 15 accelerations, no decelerations, reactive  [place]: ctx absent    Lab, Imaging and other studies: I have personally reviewed pertinent reports.      Lab Results   Component Value Date    WBC 9.89 07/17/2024    HGB 12.0 07/17/2024    HCT 36.6 07/17/2024    MCV 94 07/17/2024     07/17/2024       Lab Results   Component Value Date    GLUCOSE 99 05/10/2018    CALCIUM 9.0 08/07/2023     05/10/2018    K 3.8 08/07/2023    CO2 25 08/07/2023     08/07/2023    BUN 10 08/07/2023    CREATININE 0.62 08/07/2023       Lab Results   Component Value Date    ALT 14 08/07/2023    AST 14 08/07/2023    ALKPHOS 56 08/07/2023    BILITOT 0.5 05/10/2018       Roberto Eagle  OB/GYN PGY-4  7/18/2024, 6:58 AM

## 2024-07-18 NOTE — LACTATION NOTE
This note was copied from a baby's chart.  CONSULT - LACTATION  Baby Girl Self (Giselle) 0 days female MRN: [id number]    [facility] NURSERY Room / Bed:  416(N)/ 416(N) Encounter: [id number]    Maternal Information     MOTHER:  Ruth Self  Maternal Age: 33 y.o.  OB History: # 1 - Date: 16, Sex: Female, Weight: 2863 g (6 lb 5 oz), GA: 39w0d, Type: , Low Transverse, Apgar1: 8, Apgar5: 9, Living: Living, Birth Comments: None    # 2 - Date: 2022, Sex: None, Weight: None, GA: None, Type: None, Apgar1: None, Apgar5: None, Living: None, Birth Comments: None    # 3 - Date: 24, Sex: Female, Weight: 2270 g (5 lb 0.1 oz), GA: 37w1d, Type: , Low Transverse, Apgar1: 8, Apgar5: 9, Living: Living, Birth Comments: None   Previouse breast reduction surgery? No    Lactation history:   Has patient previously breast fed: Yes   How long had patient previously breast fed: 2 months   Previous breast feeding complications:  (Mixed feeding plan)     Past Surgical History:   Procedure Laterality Date    ANKLE SURGERY Right      SECTION      [facility]  DELIVERY ONLY N/A 2016    Procedure:  SECTION ();  Surgeon: Jaylon Granados MD;  Location: [facility];  Service: Obstetrics       Birth information:  YOB: 2026   Time of birth: 12:45 PM   Sex: female   Delivery type: , Low Transverse   Birth Weight: 2270 g (5 lb 0.1 oz)   Percent of Weight Change: 0%     Gestational Age: 37w1d   [unfilled]    Assessment     Breast and nipple assessment:  Denies need for assistance    [place] Assessment: sleepy    Feeding assessment: feeding wellafter delivery; plan is for mixed feedings    LATCH:  Latch: Grasps breast, tongue down, lips flanged, rhythmic sucking   Audible Swallowing: A few with stimulation   Type of Nipple: Everted (After stimulation)   Comfort (Breast/Nipple): Soft/non-tender   Hold (Positioning):  Partial assist, teach one side, mother does other, staff holds   LATCH Score: 8          Feeding recommendations:  breast feed on demand    Met with family. Mom states she has a mixed feeding intent and has breastfeed in the past. Denies need for help at this time. Overview of Ready' Set Baby & discharge breastfeeding booklets in [place]. Dad translating. Information  including the feeding log. Emphasized 8 or more (12) feedings in a 24 hour period, what to expect for the number of diapers per day of life and the progression of properties of the  stooling pattern.    List of reasons to call a lactation consultant.  Feeding logs  Feeding cues  Hand expression  Baby's Second day (cluster feeding)  Breastfeeding and Your Lifestyle (Medications, Alcohol, Caffeine, Smoking, Street Drugs, Methadone)  First Two Weeks Survival Guide for Breastfeeding  Breast Changes  Physical Therapy  Storage and Handling of Breast milk  How to Keep Your Breast Pump Kit Clean  The Employed Breastfeeding Mother  Mixed feeding  Bottle feeding like breastfeeding (paced bottle feeding)  astfeeding and your lifestyle, storage and preparation of breast milk, how to keep you breast pump clean, the employed breastfeeding mother and paced bottle feeding handouts.     Booklet included Breastfeeding Resources for after discharge including access to the number for the [facility].    Encouraged parents to call for assistance, questions, and concerns about breastfeeding.  Extension provided.                  Mary Castillo RN 2024 5:59 PM

## 2024-07-18 NOTE — OP NOTE
OPERATIVE REPORT  PATIENT NAME: Ruth Self    :  1990  MRN: [id number]  Pt Location: [facility] L&D OR ROOM 01    SURGERY DATE: 2024    Surgeons and Role:     * Belén Bray MD - Primary     * Ondina Contreras MD - Assisting    Preop Diagnosis:  Pregnancy at 37w1d gestational age   Severe fetal growth restriction   History of Epilepsy   Anemia  Obesity         Procedure(s) (LRB):   SECTION () REPEAT (N/A)    Specimen(s):  ID Type Source Tests Collected by Time Destination   1 :  Tissue (Placenta on Hold) OB Only Placenta TISSUE EXAM OB (PLACENTA) ONLY Belén Bray MD 2024 1256    A :  Cord Blood Cord BLOOD GAS, VENOUS, CORD, BLOOD GAS, ARTERIAL, CORD Belén Bray MD 2024 1235        Surgical QBL:  Surgical QBL (mL): 556 mL      Drains:  Urethral Catheter Non-latex;Straight-tip 16 Fr. (Active)   Number of days: 0       Anesthesia Type:   Spinal     Operative Indications:  Severe fatal growth restriction      Gee Group Classification System:  Multiple pregnancy +  is GEE GROUP 8    Operative Findings:  1. Delivery of viable female on 24 at 1245, weight 5lbs 0.1oz;  Apgar scores of 8 at one minute and 9 at five minutes. Umbilical artery pH 7.2 (base excess -2.9)  2. Normal appearing placenta with centrally-inserted 3 vessel cord  3. Clear amniotic fluid  4.Grossly normal external genitalia    Dense peritoneal adhesions    Asymmetrically enlarged uterus at the left cornua     Grossly normal bilateral fallopian tubes and ovaries with some endometriosis implants noted on the posterior aspect of the lower uterine segment at the left ovary      Complications:   None    Procedure and Technique:  In the operating room the correct patient and procedures were identified. Spinal anesthesia was adequately established. 2g Ancef IV was given for preoperative surgical prophylaxis. Patient was placed in the dorsal supine position with a left tilt of  the hips. Fetal heart tones were confirmed to be within normal limits. Chlorhexidine  was used to prep the vagina and perineum. A correia catheter was inserted. Chloraprep was used to prep the abdomen. She was draped in the usual sterile fashion. Time out was performed with all in agreement. Time out was performed.     Pfannenstiel  incision was made in the skin with a surgical scalpel and sharp dissection was carried out over subsequent layers of tissue down to the level of the fascia. The fascia was incised at the midline and the incision was extended bilaterally using  the curved Willoughby scissors. The superior edge of the fascial incision was grasped with Kocher clamps and dissected off the underlying rectus muscles  bluntly. The inferior edge of the fascial incision was grasped with Kocher clamps and the underlying rectus muscles bluntly and sharply dissected off with willoughby scissors. The rectus muscles were then divided at midline and the peritoneum was identified and entered. Bovie electrocautery was used to lyse  dense peritoneal adhesions. The peritoneal incision was then extended bilaterally. The bladder blade was inserted and the vesicouterine peritoneum was identified.  A transverse incision was made in the lower uterine segment using a new surgical blade and the hysterotomy was entered bluntly. Clear amniotic fluid was noted. The uterine incision was extended cephalad and caudal using blunt dissection. The surgeon's hand was placed into the uterine cavity. The fetal head was identified and elevated through the uterine incision with the assistance of fundal pressure. There was no nuchal cord noted. With gentle traction, the shoulders were delivered followed by the rest of the fetal body. Following delayed cord clamping, the umbilical cord was doubly clamped and cut. The infant was then passed off the table to the awaiting  staff. The  was noted to cry spontaneously and moved all extremities.  Venous and arterial blood gas, cord blood, and portion of cord was obtained for analysis and routine blood testing.  The placenta delivered with uterine massage and was noted to be intact with and had a central insertion of a three-vessel cord. The placenta was sent to pathology. Oxytocin was administered by IV infusion to enhance uterine contraction. The uterus was exteriorized and cleared of all clots and remaining products of conception.      The hysterotomy was reapproximated using 0- Vicryl  in a running non locked fashion.  A second imbricating stitch with 0- vicryl was applied. A figure of eight stitch was applied to the right lateral edge of the hysterotomy where continued oozing was noted. Bovie electrocautery was used to ensure hemostasis at the inferior serosal edge.  Small strips of Surgifoam were placed over the peritoneal reflection of the bladder to avoid further Bovie cautery.  Hemostasis was achieved. The posterior cul-de-sac was cleared of all clots and products of conception. The uterus was replaced into the abdomen and the pericolic gutters were cleared of all clots. Hemostasis was once again confirmed at the hysterotomy.  The fascia was reapproximated using 0- vicryl in a running nonlocked fashion. The subcutaneous tissue was irrigated and cleared of all clots and debris. Good hemostasis was noted with Bovie electrocautery. The subcutaneous tissue was reapproximated with  two layers using 2-0 vicryl in a running non locked fashion.  The skin incision was closed in a running subcuticular fashion with 4-0 Monocryl.  Good hemostasis was noted. Steri strips and pressure dressing was applied. The uterus was expressed of clots. Patient tolerated the procedure well.  All needle, sponge, and instrument counts were noted to be correct x 2 at the end of the procedure.  Patient was transferred to the recovery room in stable condition.      Dr. Bray was present for the procedure.         Patient  Disposition:  Postpartum recovery room        SIGNATURE: Ondina Contreras MD  DATE: July 18, 2024  TIME: 1:59 PM      I was present and scrubbed for the entire surgery and I agree with the documentation as described.    Belén Bray MD

## 2024-07-19 LAB
ERYTHROCYTE [DISTWIDTH] IN BLOOD BY AUTOMATED COUNT: 12.7 % (ref 11.6–15.1)
HCT VFR BLD AUTO: 31.8 % (ref 34.8–46.1)
HGB BLD-MCNC: 10.6 G/DL (ref 11.5–15.4)
MCH RBC QN AUTO: 31.2 PG (ref 26.8–34.3)
MCHC RBC AUTO-ENTMCNC: 33.3 G/DL (ref 31.4–37.4)
MCV RBC AUTO: 94 FL (ref 82–98)
PLATELET # BLD AUTO: 293 THOUSANDS/UL (ref 149–390)
PMV BLD AUTO: 10 FL (ref 8.9–12.7)
RBC # BLD AUTO: 3.4 MILLION/UL (ref 3.81–5.12)
WBC # BLD AUTO: 12.08 THOUSAND/UL (ref 4.31–10.16)

## 2024-07-19 PROCEDURE — 85027 COMPLETE CBC AUTOMATED: CPT

## 2024-07-19 PROCEDURE — 94762 N-INVAS EAR/PLS OXIMTRY CONT: CPT

## 2024-07-19 PROCEDURE — 99024 POSTOP FOLLOW-UP VISIT: CPT | Performed by: OBSTETRICS & GYNECOLOGY

## 2024-07-19 RX ORDER — DIPHENHYDRAMINE HYDROCHLORIDE 50 MG/ML
25 INJECTION INTRAMUSCULAR; INTRAVENOUS EVERY 6 HOURS PRN
Status: DISCONTINUED | OUTPATIENT
Start: 2024-07-19 | End: 2024-07-20 | Stop reason: HOSPADM

## 2024-07-19 RX ADMIN — KETOROLAC TROMETHAMINE 30 MG: 30 INJECTION, SOLUTION INTRAMUSCULAR; INTRAVENOUS at 07:43

## 2024-07-19 RX ADMIN — DOCUSATE SODIUM 100 MG: 100 CAPSULE, LIQUID FILLED ORAL at 09:25

## 2024-07-19 RX ADMIN — DOCUSATE SODIUM 100 MG: 100 CAPSULE, LIQUID FILLED ORAL at 18:36

## 2024-07-19 RX ADMIN — DIPHENHYDRAMINE HYDROCHLORIDE 25 MG: 50 INJECTION, SOLUTION INTRAMUSCULAR; INTRAVENOUS at 22:08

## 2024-07-19 RX ADMIN — ACETAMINOPHEN 975 MG: 325 TABLET, FILM COATED ORAL at 18:35

## 2024-07-19 RX ADMIN — LEVETIRACETAM 1000 MG: 500 TABLET, FILM COATED ORAL at 19:34

## 2024-07-19 RX ADMIN — KETOROLAC TROMETHAMINE 30 MG: 30 INJECTION, SOLUTION INTRAMUSCULAR; INTRAVENOUS at 13:51

## 2024-07-19 RX ADMIN — ENOXAPARIN SODIUM 40 MG: 40 INJECTION SUBCUTANEOUS at 11:33

## 2024-07-19 RX ADMIN — KETOROLAC TROMETHAMINE 30 MG: 30 INJECTION, SOLUTION INTRAMUSCULAR; INTRAVENOUS at 19:49

## 2024-07-19 NOTE — CASE MANAGEMENT
Case Management Progress Note    Patient name Ruth Self  Location /-01 MRN [id number]  : 1990 Date 2024       LOS (days): 2  Geometric Mean LOS (GMLOS) (days):   Days to GMLOS:        OBJECTIVE:        Current admission status: Inpatient  Preferred Pharmacy:   [id number] - [place], [place] - [address]  [address]  [place] [address]  Phone: [phone] Fax: [phone]    Primary Care Provider: Sahara Alvarez MD    Primary Insurance: BLUE CROSS  Secondary Insurance:     PROGRESS NOTE:    CM received consult for breast pump. MOB requesting Zomee Z2  pump. CM placed order via [employer]. Order approved. CM delivered pump to MOB's room. Delivery ticket signed and placed in bin for DME liaison.

## 2024-07-19 NOTE — ASSESSMENT & PLAN NOTE
, Hgb 12.0 --> post op Hgb 10.6  Lines: correia removed, passed void trial  Pain: Tylenol and toradol scheduled, elda 5/10 PRN    FEN: Tolerating regular diet  DVT ppx: SCDs and  Lovenox 40mg qD  Passing flatus   Incision C/D/I    Contraception: POP, micronor bridge

## 2024-07-19 NOTE — PLAN OF CARE
Problem: PAIN - ADULT  Goal: Verbalizes/displays adequate comfort level or baseline comfort level  Description: Interventions:  - Encourage patient to monitor pain and request assistance  - Assess pain using appropriate pain scale  - Administer analgesics based on type and severity of pain and evaluate response  - Implement non-pharmacological measures as appropriate and evaluate response  - Consider cultural and social influences on pain and pain management  - Notify physician/advanced practitioner if interventions unsuccessful or patient reports new pain  Outcome: Progressing     Problem: INFECTION - ADULT  Goal: Absence or prevention of progression during hospitalization  Description: INTERVENTIONS:  - Assess and monitor for signs and symptoms of infection  - Monitor lab/diagnostic results  - Monitor all insertion sites, i.e. indwelling lines, tubes, and drains  - Monitor endotracheal if appropriate and nasal secretions for changes in amount and color  - [place] appropriate cooling/warming therapies per order  - Administer medications as ordered  - Instruct and encourage patient and family to use good hand hygiene technique  - Identify and instruct in appropriate isolation precautions for identified infection/condition  Outcome: Progressing  Goal: Absence of fever/infection during neutropenic period  Description: INTERVENTIONS:  - Monitor WBC    Outcome: Progressing     Problem: SAFETY ADULT  Goal: Patient will remain free of falls  Description: INTERVENTIONS:  - Educate patient/family on patient safety including physical limitations  - Instruct patient to call for assistance with activity   - Consult OT/PT to assist with strengthening/mobility   - Keep Call bell within reach  - Keep bed low and locked with side rails adjusted as appropriate  - Keep care items and personal belongings within reach  - Initiate and maintain comfort rounds  - Make Fall Risk Sign visible to staff  - Apply yellow socks and bracelet  for high fall risk patients  - Consider moving patient to room near nurses station  Outcome: Progressing  Goal: Maintain or return to baseline ADL function  Description: INTERVENTIONS:  -  Assess patient's ability to carry out ADLs; assess patient's baseline for ADL function and identify physical deficits which impact ability to perform ADLs (bathing, care of mouth/teeth, toileting, grooming, dressing, etc.)  - Assess/evaluate cause of self-care deficits   - Assess range of motion  - Assess patient's mobility; develop plan if impaired  - Assess patient's need for assistive devices and provide as appropriate  - Encourage maximum independence but intervene and supervise when necessary  - Involve family in performance of ADLs  - Assess for home care needs following discharge   - Consider OT consult to assist with ADL evaluation and planning for discharge  - Provide patient education as appropriate  Outcome: Progressing  Goal: Maintains/Returns to pre admission functional level  Description: INTERVENTIONS:  - Perform AM-PAC 6 Click Basic Mobility/ Daily Activity assessment daily.  - Set and communicate daily mobility goal to care team and patient/family/caregiver.   - Collaborate with rehabilitation services on mobility goals if consulted  - Out of bed for toileting  - Record patient progress and toleration of activity level   Outcome: Progressing     Problem: DISCHARGE PLANNING  Goal: Discharge to home or other facility with appropriate resources  Description: INTERVENTIONS:  - Identify barriers to discharge w/patient and caregiver  - Arrange for needed discharge resources and transportation as appropriate  - Identify discharge learning needs (meds, wound care, etc.)  - Arrange for interpretive services to assist at discharge as needed  - Refer to Case Management Department for coordinating discharge planning if the patient needs post-hospital services based on physician/advanced practitioner order or complex needs  related to functional status, cognitive ability, or social support system  Outcome: Progressing     Problem: Knowledge Deficit  Goal: Patient/family/caregiver demonstrates understanding of disease process, treatment plan, medications, and discharge instructions  Description: Complete learning assessment and assess knowledge base.  Interventions:  - Provide teaching at level of understanding  - Provide teaching via preferred learning methods  Outcome: Progressing     Problem: POSTPARTUM  Goal: Experiences normal postpartum course  Description: INTERVENTIONS:  - Monitor maternal vital signs  - Assess uterine involution and lochia  Outcome: Progressing  Goal: Appropriate maternal -  bonding  Description: INTERVENTIONS:  - Identify family support  - Assess for appropriate maternal/infant bonding   -Encourage maternal/infant bonding opportunities  - Referral to  or  as needed  Outcome: Progressing  Goal: Establishment of infant feeding pattern  Description: INTERVENTIONS:  - Assess breast/bottle feeding  - Refer to lactation as needed  Outcome: Progressing  Goal: Incision(s), wounds(s) or drain site(s) healing without S/S of infection  Description: INTERVENTIONS  - Assess and document dressing, incision, wound bed, drain sites and surrounding tissue  - Provide patient and family education  - Outcome: Progressing

## 2024-07-19 NOTE — PROGRESS NOTES
"Progress Note - OB/GYN  Ruth Self 33 y.o. female MRN: [id number]  Unit/Bed#:  416-01 Encounter: [id number]    Assessment and Plan     Ruth Self is a patient of: [facility]. She is PPD# 1 s/p 1LTCS  at 37 wks secondary to severe fetal growth restriction Recovering well and is stable       * Status post repeat low transverse  section  Assessment & Plan  , Hgb 12.0 --> post op Hgb 10.6  Lines: correia removed, passed void trial  Pain: Tylenol and toradol scheduled, elad 5/10 PRN    FEN: Tolerating regular diet  DVT ppx: SCDs and  Lovenox 40mg qD  Passing flatus   Incision C/D/I    Contraception: POP, micronor bridge    Anemia affecting pregnancy in third trimester  Assessment & Plan  Last Hgb 11.3, on PO iron supplementation  Continue iron supplementation  Admission Hgb 12.0    Seizure disorder during pregnancy, antepartum ([facility])  Assessment & Plan  Currently on Keppra   Follows with Neurology at [facility]          History of  delivery, antepartum  Assessment & Plan        Rubella non-immune status, antepartum  Assessment & Plan   MMR postpartum ordered        Disposition    - Anticipate discharge home on POD# 2-4      Subjective/Objective     Chief Complaint: Postpartum State     Subjective:    Ruth Self is POD#1 s/p 1LTCS . She has no current complaints.  Pain is well controlled.  Patient is currently voiding.  She is ambulating.  Patient is currently passing flatus and has had no bowel movement. She is tolerating PO, and denies nausea or vomitting. Patient denies fever, chills, chest pain, shortness of breath, or calf tenderness. Lochia is normal. She is  Breastfeeding. She is recovering well and is stable.       Vitals:   BP 96/53 (BP Location: Right arm)   Pulse 75   Temp 98.2 °F (36.8 °C) (Temporal)   Resp 18   Ht 5' 6\" (1.676 m)   Wt 109 kg (241 lb)   LMP 2023   SpO2 98%   Breastfeeding Yes   BMI 38.90 kg/m² "       Intake/Output Summary (Last 24 hours) at 7/19/2024 0652  Last data filed at 7/19/2024 0400  Gross per 24 hour   Intake 4057.14 ml   Output 2681 ml   Net 1376.14 ml       Invasive Devices       Peripheral Intravenous Line  Duration             Peripheral IV 07/17/24 Right Hand 1 day                    Physical Exam:   GEN: Ruth Self appears well, alert and oriented x 3, pleasant and cooperative   CARDIO: RRR, no murmurs or rubs  RESP:  CTAB, no wheezes or rales  ABDOMEN: soft, no tenderness, no distention, fundus @ 2 cm below u, Incision C/D/I  EXTREMITIES: SCDs on, non tender, no erythema      Labs:     Hemoglobin   Date Value Ref Range Status   07/19/2024 10.6 (L) 11.5 - 15.4 g/dL Final   07/17/2024 12.0 11.5 - 15.4 g/dL Final   05/10/2018 12.9 12.0 - 16.0 G/DL Final     WBC   Date Value Ref Range Status   07/19/2024 12.08 (H) 4.31 - 10.16 Thousand/uL Final   07/17/2024 9.89 4.31 - 10.16 Thousand/uL Final   05/10/2018 8.8 4.5 - 11.0 K/MCL Final     Platelets   Date Value Ref Range Status   07/19/2024 293 149 - 390 Thousands/uL Final   07/17/2024 315 149 - 390 Thousands/uL Final   05/10/2018 339 150 - 450 K/MCL Final     Creatinine   Date Value Ref Range Status   08/07/2023 0.62 0.60 - 1.30 mg/dL Final     Comment:     Standardized to IDMS reference method   09/02/2022 0.67 0.60 - 1.20 mg/dL Final     Comment:     Standardized to IDMS reference method   09/14/2019 0.71 0.40 - 1.10 mg/dL Final     AST   Date Value Ref Range Status   08/07/2023 14 13 - 39 U/L Final   09/02/2022 24 14 - 36 U/L Final     Comment:     Specimen collection should occur prior to Sulfasalazine administration due to the potential for falsely depressed results.    09/14/2019 12 <41 U/L Final   05/10/2018 18 14 - 36 U/L Final     ALT   Date Value Ref Range Status   08/07/2023 14 7 - 52 U/L Final     Comment:     Specimen collection should occur prior to Sulfasalazine administration due to the potential for falsely depressed  results.    09/02/2022 33 <35 U/L Final     Comment:     Specimen collection should occur prior to Sulfasalazine administration due to the potential for falsely depressed results.    09/14/2019 23 <56 U/L Final   05/10/2018 28 9 - 52 U/L Final          Ondina Contreras MD  7/19/2024  6:52 AM

## 2024-07-20 VITALS
HEART RATE: 86 BPM | RESPIRATION RATE: 16 BRPM | OXYGEN SATURATION: 99 % | WEIGHT: 241 LBS | HEIGHT: 66 IN | TEMPERATURE: 98 F | BODY MASS INDEX: 38.73 KG/M2 | SYSTOLIC BLOOD PRESSURE: 122 MMHG | DIASTOLIC BLOOD PRESSURE: 65 MMHG

## 2024-07-20 RX ORDER — SENNOSIDES 8.6 MG
1 TABLET ORAL ONCE
Status: DISCONTINUED | OUTPATIENT
Start: 2024-07-20 | End: 2024-07-20 | Stop reason: HOSPADM

## 2024-07-20 RX ORDER — POLYETHYLENE GLYCOL 3350 17 G/17G
POWDER, FOR SOLUTION ORAL
Qty: 238 G | Refills: 0 | Status: SHIPPED | OUTPATIENT
Start: 2024-07-20

## 2024-07-20 RX ORDER — ACETAMINOPHEN 325 MG/1
975 TABLET ORAL EVERY 6 HOURS SCHEDULED
Qty: 100 TABLET | Refills: 0 | Status: SHIPPED | OUTPATIENT
Start: 2024-07-20 | End: 2024-07-29

## 2024-07-20 RX ORDER — POLYETHYLENE GLYCOL 3350 17 G/17G
17 POWDER, FOR SOLUTION ORAL DAILY
Status: DISCONTINUED | OUTPATIENT
Start: 2024-07-20 | End: 2024-07-20 | Stop reason: HOSPADM

## 2024-07-20 RX ORDER — POLYETHYLENE GLYCOL 3350 17 G/17G
17 POWDER, FOR SOLUTION ORAL DAILY
Qty: 10 EACH | Refills: 0 | Status: SHIPPED | OUTPATIENT
Start: 2024-07-20 | End: 2024-07-20

## 2024-07-20 RX ORDER — ACETAMINOPHEN AND CODEINE PHOSPHATE 120; 12 MG/5ML; MG/5ML
1 SOLUTION ORAL DAILY
Qty: 86 TABLET | Refills: 3 | Status: SHIPPED | OUTPATIENT
Start: 2024-07-20

## 2024-07-20 RX ORDER — IBUPROFEN 600 MG/1
600 TABLET ORAL EVERY 6 HOURS
Qty: 30 TABLET | Refills: 0 | Status: SHIPPED | OUTPATIENT
Start: 2024-07-20

## 2024-07-20 RX ADMIN — POLYETHYLENE GLYCOL 3350 17 G: 17 POWDER, FOR SOLUTION ORAL at 09:49

## 2024-07-20 RX ADMIN — ACETAMINOPHEN 975 MG: 325 TABLET, FILM COATED ORAL at 01:00

## 2024-07-20 RX ADMIN — KETOROLAC TROMETHAMINE 30 MG: 30 INJECTION, SOLUTION INTRAMUSCULAR; INTRAVENOUS at 05:15

## 2024-07-20 RX ADMIN — ACETAMINOPHEN 975 MG: 325 TABLET, FILM COATED ORAL at 07:17

## 2024-07-20 RX ADMIN — ENOXAPARIN SODIUM 40 MG: 40 INJECTION SUBCUTANEOUS at 11:42

## 2024-07-20 RX ADMIN — IBUPROFEN 600 MG: 600 TABLET, FILM COATED ORAL at 11:41

## 2024-07-20 NOTE — PLAN OF CARE
Problem: PAIN - ADULT  Goal: Verbalizes/displays adequate comfort level or baseline comfort level  Description: Interventions:  - Encourage patient to monitor pain and request assistance  - Assess pain using appropriate pain scale  - Administer analgesics based on type and severity of pain and evaluate response  - Implement non-pharmacological measures as appropriate and evaluate response  - Consider cultural and social influences on pain and pain management  - Notify physician/advanced practitioner if interventions unsuccessful or patient reports new pain  Outcome: Progressing     Problem: INFECTION - ADULT  Goal: Absence or prevention of progression during hospitalization  Description: INTERVENTIONS:  - Assess and monitor for signs and symptoms of infection  - Monitor lab/diagnostic results  - Monitor all insertion sites, i.e. indwelling lines, tubes, and drains  - Monitor endotracheal if appropriate and nasal secretions for changes in amount and color  - [place] appropriate cooling/warming therapies per order  - Administer medications as ordered  - Instruct and encourage patient and family to use good hand hygiene technique  - Identify and instruct in appropriate isolation precautions for identified infection/condition  Outcome: Progressing  Goal: Absence of fever/infection during neutropenic period  Description: INTERVENTIONS:  - Monitor WBC    Outcome: Progressing     Problem: SAFETY ADULT  Goal: Patient will remain free of falls  Description: INTERVENTIONS:  - Educate patient/family on patient safety including physical limitations  - Instruct patient to call for assistance with activity   - Consult OT/PT to assist with strengthening/mobility   - Keep Call bell within reach  - Keep bed low and locked with side rails adjusted as appropriate  - Keep care items and personal belongings within reach  - Initiate and maintain comfort rounds  - Make Fall Risk Sign visible to staff  - Apply yellow socks and bracelet  for high fall risk patients  - Consider moving patient to room near nurses station  Outcome: Progressing  Goal: Maintain or return to baseline ADL function  Description: INTERVENTIONS:  -  Assess patient's ability to carry out ADLs; assess patient's baseline for ADL function and identify physical deficits which impact ability to perform ADLs (bathing, care of mouth/teeth, toileting, grooming, dressing, etc.)  - Assess/evaluate cause of self-care deficits   - Assess range of motion  - Assess patient's mobility; develop plan if impaired  - Assess patient's need for assistive devices and provide as appropriate  - Encourage maximum independence but intervene and supervise when necessary  - Involve family in performance of ADLs  - Assess for home care needs following discharge   - Consider OT consult to assist with ADL evaluation and planning for discharge  - Provide patient education as appropriate  Outcome: Progressing  Goal: Maintains/Returns to pre admission functional level  Description: INTERVENTIONS:  - Perform AM-PAC 6 Click Basic Mobility/ Daily Activity assessment daily.  - Set and communicate daily mobility goal to care team and patient/family/caregiver.   - Collaborate with rehabilitation services on mobility goals if consulted  - Out of bed for toileting  - Record patient progress and toleration of activity level   Outcome: Progressing     Problem: DISCHARGE PLANNING  Goal: Discharge to home or other facility with appropriate resources  Description: INTERVENTIONS:  - Identify barriers to discharge w/patient and caregiver  - Arrange for needed discharge resources and transportation as appropriate  - Identify discharge learning needs (meds, wound care, etc.)  - Arrange for interpretive services to assist at discharge as needed  - Refer to Case Management Department for coordinating discharge planning if the patient needs post-hospital services based on physician/advanced practitioner order or complex needs  related to functional status, cognitive ability, or social support system  Outcome: Progressing     Problem: Knowledge Deficit  Goal: Patient/family/caregiver demonstrates understanding of disease process, treatment plan, medications, and discharge instructions  Description: Complete learning assessment and assess knowledge base.  Interventions:  - Provide teaching at level of understanding  - Provide teaching via preferred learning methods  Outcome: Progressing     Problem: POSTPARTUM  Goal: Experiences normal postpartum course  Description: INTERVENTIONS:  - Monitor maternal vital signs  - Assess uterine involution and lochia  Outcome: Progressing  Goal: Appropriate maternal -  bonding  Description: INTERVENTIONS:  - Identify family support  - Assess for appropriate maternal/infant bonding   -Encourage maternal/infant bonding opportunities  - Referral to  or  as needed  Outcome: Progressing  Goal: Establishment of infant feeding pattern  Description: INTERVENTIONS:  - Assess breast/bottle feeding  - Refer to lactation as needed  Outcome: Progressing  Goal: Incision(s), wounds(s) or drain site(s) healing without S/S of infection  Description: INTERVENTIONS  - Assess and document dressing, incision, wound bed, drain sites and surrounding tissue  - Provide patient and family education    Outcome: Progressing

## 2024-07-20 NOTE — PLAN OF CARE
Problem: PAIN - ADULT  Goal: Verbalizes/displays adequate comfort level or baseline comfort level  Description: Interventions:  - Encourage patient to monitor pain and request assistance  - Assess pain using appropriate pain scale  - Administer analgesics based on type and severity of pain and evaluate response  - Implement non-pharmacological measures as appropriate and evaluate response  - Consider cultural and social influences on pain and pain management  - Notify physician/advanced practitioner if interventions unsuccessful or patient reports new pain  7/20/2024 1438 by Tana Burroughs RN  Outcome: Completed  7/20/2024 0742 by Tana Burroughs RN  Outcome: Progressing     Problem: INFECTION - ADULT  Goal: Absence or prevention of progression during hospitalization  Description: INTERVENTIONS:  - Assess and monitor for signs and symptoms of infection  - Monitor lab/diagnostic results  - Monitor all insertion sites, i.e. indwelling lines, tubes, and drains  - Monitor endotracheal if appropriate and nasal secretions for changes in amount and color  - [place] appropriate cooling/warming therapies per order  - Administer medications as ordered  - Instruct and encourage patient and family to use good hand hygiene technique  - Identify and instruct in appropriate isolation precautions for identified infection/condition  7/20/2024 1438 by Tana Burroughs RN  Outcome: Completed  7/20/2024 0742 by Tana Burroughs RN  Outcome: Progressing  Goal: Absence of fever/infection during neutropenic period  Description: INTERVENTIONS:  - Monitor WBC    7/20/2024 1438 by Tana Burroughs RN  Outcome: Completed  7/20/2024 0742 by Tana Burroughs RN  Outcome: Progressing     Problem: SAFETY ADULT  Goal: Patient will remain free of falls  Description: INTERVENTIONS:  - Educate patient/family on patient safety including physical limitations  - Instruct patient to call for assistance with activity   - Consult OT/PT to assist with strengthening/mobility    - Keep Call bell within reach  - Keep bed low and locked with side rails adjusted as appropriate  - Keep care items and personal belongings within reach  - Initiate and maintain comfort rounds  - Make Fall Risk Sign visible to staff  - Offer Toileting every  Hours, in advance of need  - Initiate/Maintain alarm  - Obtain necessary fall risk management equipment:   - Apply yellow socks and bracelet for high fall risk patients  - Consider moving patient to room near nurses station  7/20/2024 1438 by Tana Burroughs RN  Outcome: Completed  7/20/2024 0742 by Tana Burroughs RN  Outcome: Progressing  Goal: Maintain or return to baseline ADL function  Description: INTERVENTIONS:  -  Assess patient's ability to carry out ADLs; assess patient's baseline for ADL function and identify physical deficits which impact ability to perform ADLs (bathing, care of mouth/teeth, toileting, grooming, dressing, etc.)  - Assess/evaluate cause of self-care deficits   - Assess range of motion  - Assess patient's mobility; develop plan if impaired  - Assess patient's need for assistive devices and provide as appropriate  - Encourage maximum independence but intervene and supervise when necessary  - Involve family in performance of ADLs  - Assess for home care needs following discharge   - Consider OT consult to assist with ADL evaluation and planning for discharge  - Provide patient education as appropriate  7/20/2024 1438 by Tana Burroughs RN  Outcome: Completed  7/20/2024 0742 by Tana Burroughs RN  Outcome: Progressing  Goal: Maintains/Returns to pre admission functional level  Description: INTERVENTIONS:  - Perform AM-PAC 6 Click Basic Mobility/ Daily Activity assessment daily.  - Set and communicate daily mobility goal to care team and patient/family/caregiver.   - Collaborate with rehabilitation services on mobility goals if consulted  - Perform Range of Motion  times a day.  - Reposition patient every  hours.  - Dangle patient  times a day  -  Stand patient  times a day  - Ambulate patient  times a day  - Out of bed to chair  times a day   - Out of bed for meals  times a day  - Out of bed for toileting  - Record patient progress and toleration of activity level   2024 by Tana Burroughs RN  Outcome: Completed  2024 by Tana Burroughs RN  Outcome: Progressing     Problem: DISCHARGE PLANNING  Goal: Discharge to home or other facility with appropriate resources  Description: INTERVENTIONS:  - Identify barriers to discharge w/patient and caregiver  - Arrange for needed discharge resources and transportation as appropriate  - Identify discharge learning needs (meds, wound care, etc.)  - Arrange for interpretive services to assist at discharge as needed  - Refer to Case Management Department for coordinating discharge planning if the patient needs post-hospital services based on physician/advanced practitioner order or complex needs related to functional status, cognitive ability, or social support system  2024 by Tana Burroughs RN  Outcome: Completed  2024 by Tana Burroughs RN  Outcome: Progressing     Problem: Knowledge Deficit  Goal: Patient/family/caregiver demonstrates understanding of disease process, treatment plan, medications, and discharge instructions  Description: Complete learning assessment and assess knowledge base.  Interventions:  - Provide teaching at level of understanding  - Provide teaching via preferred learning methods  2024 by Tana Burroughs RN  Outcome: Completed  2024 by Tana Burroughs RN  Outcome: Progressing     Problem: POSTPARTUM  Goal: Experiences normal postpartum course  Description: INTERVENTIONS:  - Monitor maternal vital signs  - Assess uterine involution and lochia  2024 by Tana Burroughs RN  Outcome: Completed  2024 by Tana Burroughs RN  Outcome: Progressing  Goal: Appropriate maternal -  bonding  Description: INTERVENTIONS:  - Identify family support  -  Assess for appropriate maternal/infant bonding   -Encourage maternal/infant bonding opportunities  - Referral to  or  as needed  7/20/2024 1438 by Tana Burroughs RN  Outcome: Completed  7/20/2024 0742 by Tana Burroughs RN  Outcome: Progressing  Goal: Establishment of infant feeding pattern  Description: INTERVENTIONS:  - Assess breast/bottle feeding  - Refer to lactation as needed  7/20/2024 1438 by Tana Burroughs RN  Outcome: Completed  7/20/2024 0742 by Tana Burroughs RN  Outcome: Progressing  Goal: Incision(s), wounds(s) or drain site(s) healing without S/S of infection  Description: INTERVENTIONS  - Assess and document dressing, incision, wound bed, drain sites and surrounding tissue  - Provide patient and family education  - Perform skin care/dressing changes every   7/20/2024 1438 by Tana Burroughs RN  Outcome: Completed  7/20/2024 0742 by Tana Burroughs RN  Outcome: Progressing

## 2024-07-20 NOTE — PROGRESS NOTES
Progress Note - OB/GYN   Ruth Self 33 y.o. female MRN: [id number]  Unit/Bed#:  416-01 Encounter: [id number]      Assessment/Plan    Ruth Self is a 33 y.o.  who is POD #2 s/p RLTCS at 37w1d for FGR    Anemia affecting pregnancy in third trimester  Assessment & Plan  Last Hgb 11.3, on PO iron supplementation  Continue iron supplementation  Admission Hgb 12.0    Seizure disorder during pregnancy, antepartum ([facility])  Assessment & Plan  Currently on Keppra   Follows with [facility]rology at [facility]          History of  delivery, antepartum  Assessment & Plan        Rubella non-immune status, antepartum  Assessment & Plan   MMR postpartum ordered    * Status post repeat low transverse  section  Assessment & Plan  , Hgb 12.0 --> post op Hgb 10.6  Lines: correia removed, passed void trial  Pain: Tylenol and toradol scheduled, elda 5/10 PRN    FEN: Tolerating regular diet  DVT ppx: SCDs and  Lovenox 40mg qD  Passing flatus   Incision C/D/I    Contraception: POP, micronor bridge           Disposition: Anticipate discharge home postpartum Day #3-4      Subjective/Objective     Subjective: Postpartum state    Pain: no  Tolerating PO: yes  Voiding: yes  Flatus: yes  BM: no  Ambulating: yes  Breastfeeding: Breastfeeding  Chest pain: no  Shortness of breath: no  Leg pain: no  Lochia: wnl    Objective:     Vitals:  Vitals:    24 1533 24 1900 24 2300 24 0300   BP: 118/74 120/68 100/53 128/81   BP Location:  Left arm Right arm Left arm   Pulse: 84 90 81 74   Resp: 17 17 18 18   Temp: 97.9 °F (36.6 °C) 97.6 °F (36.4 °C) 97.9 °F (36.6 °C) 98.4 °F (36.9 °C)   TempSrc: Temporal Temporal Temporal Temporal   SpO2:  98% 98% 98%   Weight:       Height:           Physical Exam:   GEN: appears well, alert and oriented x 3, pleasant and cooperative   CV: Regular rate  RESP: non labored breathing  ABDOMEN: soft, no tenderness, no distention, Uterine fundus firm and non-tender, -1  cm below the umbilicus, incision c/d/i  EXTREMITIES: non-tender  NEURO Alert and oriented to person, place, and time.       Lab Results   Component Value Date    WBC 12.08 (H) 07/19/2024    HGB 10.6 (L) 07/19/2024    HCT 31.8 (L) 07/19/2024    MCV 94 07/19/2024     07/19/2024         Sandra Quinonez, DO  Obstetrics & Gynecology  07/20/24      I personally saw and evaluated pt.  Only wants Motrin to go home.  B&B feeding. BC:  for Micron[employer]. Pt ok for d/c home now.     Patient verbalized understanding of today's discussion with all questions and concerns addressed to her satisfaction.          MIGUELANGEL Dc MD, FACOG

## 2024-07-20 NOTE — PLAN OF CARE
Problem: PAIN - ADULT  Goal: Verbalizes/displays adequate comfort level or baseline comfort level  Description: Interventions:  - Encourage patient to monitor pain and request assistance  - Assess pain using appropriate pain scale  - Administer analgesics based on type and severity of pain and evaluate response  - Implement non-pharmacological measures as appropriate and evaluate response  - Consider cultural and social influences on pain and pain management  - Notify physician/advanced practitioner if interventions unsuccessful or patient reports new pain  Outcome: Progressing     Problem: INFECTION - ADULT  Goal: Absence or prevention of progression during hospitalization  Description: INTERVENTIONS:  - Assess and monitor for signs and symptoms of infection  - Monitor lab/diagnostic results  - Monitor all insertion sites, i.e. indwelling lines, tubes, and drains  - Monitor endotracheal if appropriate and nasal secretions for changes in amount and color  - Boring appropriate cooling/warming therapies per order  - Administer medications as ordered  - Instruct and encourage patient and family to use good hand hygiene technique  - Identify and instruct in appropriate isolation precautions for identified infection/condition  Outcome: Progressing  Goal: Absence of fever/infection during neutropenic period  Description: INTERVENTIONS:  - Monitor WBC    Outcome: Progressing     Problem: SAFETY ADULT  Goal: Patient will remain free of falls  Description: INTERVENTIONS:  - Educate patient/family on patient safety including physical limitations  - Instruct patient to call for assistance with activity   - Consult OT/PT to assist with strengthening/mobility   - Keep Call bell within reach  - Keep bed low and locked with side rails adjusted as appropriate  - Keep care items and personal belongings within reach  - Initiate and maintain comfort rounds  - Make Fall Risk Sign visible to staff  - Offer Toileting every  Hours,  in advance of need  - Initiate/Maintain alarm  - Obtain necessary fall risk management equipment:   - Apply yellow socks and bracelet for high fall risk patients  - Consider moving patient to room near nurses station  Outcome: Progressing  Goal: Maintain or return to baseline ADL function  Description: INTERVENTIONS:  -  Assess patient's ability to carry out ADLs; assess patient's baseline for ADL function and identify physical deficits which impact ability to perform ADLs (bathing, care of mouth/teeth, toileting, grooming, dressing, etc.)  - Assess/evaluate cause of self-care deficits   - Assess range of motion  - Assess patient's mobility; develop plan if impaired  - Assess patient's need for assistive devices and provide as appropriate  - Encourage maximum independence but intervene and supervise when necessary  - Involve family in performance of ADLs  - Assess for home care needs following discharge   - Consider OT consult to assist with ADL evaluation and planning for discharge  - Provide patient education as appropriate  Outcome: Progressing  Goal: Maintains/Returns to pre admission functional level  Description: INTERVENTIONS:  - Perform AM-PAC 6 Click Basic Mobility/ Daily Activity assessment daily.  - Set and communicate daily mobility goal to care team and patient/family/caregiver.   - Collaborate with rehabilitation services on mobility goals if consulted  - Perform Range of Motion  times a day.  - Reposition patient every  hours.  - Dangle patient  times a day  - Stand patient  times a day  - Ambulate patient  times a day  - Out of bed to chair  times a day   - Out of bed for meals  times a day  - Out of bed for toileting  - Record patient progress and toleration of activity level   Outcome: Progressing     Problem: DISCHARGE PLANNING  Goal: Discharge to home or other facility with appropriate resources  Description: INTERVENTIONS:  - Identify barriers to discharge w/patient and caregiver  - Arrange for  needed discharge resources and transportation as appropriate  - Identify discharge learning needs (meds, wound care, etc.)  - Arrange for interpretive services to assist at discharge as needed  - Refer to Case Management Department for coordinating discharge planning if the patient needs post-hospital services based on physician/advanced practitioner order or complex needs related to functional status, cognitive ability, or social support system  Outcome: Progressing     Problem: Knowledge Deficit  Goal: Patient/family/caregiver demonstrates understanding of disease process, treatment plan, medications, and discharge instructions  Description: Complete learning assessment and assess knowledge base.  Interventions:  - Provide teaching at level of understanding  - Provide teaching via preferred learning methods  Outcome: Progressing     Problem: POSTPARTUM  Goal: Experiences normal postpartum course  Description: INTERVENTIONS:  - Monitor maternal vital signs  - Assess uterine involution and lochia  Outcome: Progressing  Goal: Appropriate maternal -  bonding  Description: INTERVENTIONS:  - Identify family support  - Assess for appropriate maternal/infant bonding   -Encourage maternal/infant bonding opportunities  - Referral to  or  as needed  Outcome: Progressing  Goal: Establishment of infant feeding pattern  Description: INTERVENTIONS:  - Assess breast/bottle feeding  - Refer to lactation as needed  Outcome: Progressing  Goal: Incision(s), wounds(s) or drain site(s) healing without S/S of infection  Description: INTERVENTIONS  - Assess and document dressing, incision, wound bed, drain sites and surrounding tissue  - Provide patient and family education  - Perform skin care/dressing changes every  Outcome: Progressing
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
## 2024-07-20 NOTE — LACTATION NOTE
This note was copied from a baby's chart.     07/20/24 1010   Lactation Consultation   Reason for Consult 10 minutes   Lactation Consultant Total Time 10   Risk Factors Language barrier   Breasts/Nipples   Intervention Hand expression   Breastfeeding Status Yes   Breastfeeding Progress Breastfeeding well  (per mother)   Other OB Lactation Tools   Feeding Devices Bottle   Breast Pump   Pump 3   Pump Review/Education Setup, frequency, and cleaning;Milk storage   Patient Follow-Up   Lactation Consult Status 2   Follow-Up Type Inpatient;Call as needed   Other OB Lactation Documentation    Additional Problem Noted Followed up with mother to answer any questions or concerns before discharge. Offered to use  and mother was comfrotable with English. Reviewed DC booklet. Baby has been latching well per mother and she is following up with formula per her plan. Mother is having no nipple pain or discomfort. Encouraged to continue to offer the breast first then give formula. Mother plpans to just breastfeed once her milk supply is established. Encouraged to call [facility] once home should she need any assistance or have questions or concerns.       Encouraged mother to call for any lactation assistance, questions or concerns during their stay.

## 2024-07-20 NOTE — DISCHARGE INSTR - AVS FIRST PAGE
Call for any fevers >101F, or chills/nausea/vomiting >24hrs, pain not well controlled with pain meds, or vaginal bleeding >2pads/hr.      Call an ambulance or have someone drive you to the nearest ER with severe pain, very heavy bleeding, or passing out.

## 2024-07-22 PROCEDURE — 88307 TISSUE EXAM BY PATHOLOGIST: CPT | Performed by: SPECIALIST

## 2024-07-26 NOTE — ANESTHESIA POSTPROCEDURE EVALUATION
Post-Op Assessment Note    CV Status:  Stable    Pain management: adequate       Mental Status:  Alert and awake   Hydration Status:  Euvolemic   PONV Controlled:  Controlled   Airway Patency:  Patent     Post Op Vitals Reviewed: Yes    No anethesia notable event occurred.    Staff: Anesthesiologist, CRNA           AVSS

## 2024-08-06 ENCOUNTER — POSTPARTUM VISIT (OUTPATIENT)
Dept: OBGYN CLINIC | Facility: CLINIC | Age: 34
End: 2024-08-06

## 2024-08-06 VITALS
HEART RATE: 67 BPM | WEIGHT: 236.6 LBS | DIASTOLIC BLOOD PRESSURE: 77 MMHG | SYSTOLIC BLOOD PRESSURE: 124 MMHG | BODY MASS INDEX: 38.19 KG/M2

## 2024-08-06 NOTE — PROGRESS NOTES
Postpartum Visit   [facility]    Assessment/Plan:  Ruth is a 33 y.o. year old  who presents for postpartum visit.    Routine Postpartum Care  - Normal postpartum exam  - Contraception: oral progesterone-only contraceptive  - Depression Screen: 0  - Psychosocial support: yes  - Patient Education: Postpartum resources including Pelvic Health PT and [facility]  - Pap smear up to date 2021 negative HPV negative, next due 2026    Subjective:     CC: Postpartum visit    Ruth Self is a 33 y.o. female  who presents for a postpartum visit.     She is approximately 2 weeks postpartum following a RTCS on 2024 at 37 weeks.     Delivery Findings:  Delivery: 2024 12:45 PM via , Low Transverse . The delivery was uncomplicated  Baby's Weight: 2270 g (5 lb 0.1 oz); 80.07    Apgar scores: 8  and 9  at 1 and 5 minutes, respectively  Anesthesia: Spinal,       ROS:  Bleeding no bleeding. Bowel function is normal. Bladder function is normal. Patient is not sexually active. Contraception method is oral progesterone-only contraceptive. Postpartum depression screening: negative with a PHQ-2 score of 0 .     Baby's course has been unremarkable  Baby is feeding by breast /bottle    The following portions of the patient's history were reviewed and updated as appropriate: allergies, current medications, past family history, past medical history, obstetric history, gynecologic history, past social history, past surgical history and problem list.      Objective:  /77 (BP Location: Left arm, Patient Position: Sitting, Cuff Size: Large)   Pulse 67   Wt 107 kg (236 lb 9.6 oz)   LMP 2023   Breastfeeding Yes   BMI 38.19 kg/m²   Pregravid Weight/BMI: 111 kg (244 lb) (BMI 39.40)  Current Weight: 107 kg (236 lb 9.6 oz)   Total Weight Gain: -1.361 kg (-3 lb)   Pre- Vitals      Flowsheet Row Most Recent Value   Prenatal Assessment    Prenatal Vitals    Blood  Pressure 124/77   Weight - Scale 107 kg (236 lb 9.6 oz)   Urine Albumin/Glucose    Dilation/Effacement/Station    Vaginal Drainage    Edema              General: Well appearing, no distress.  Mood and affect: Appropriate.  Respiratory: Unlabored breathing  Cardiovascular: Regular rate and rhythm.  Abdomen: Soft, nontender  Incision: C/D/I    Extremities: Warm and well perfused.  Non tender.    Pt is instructed to come in 03 weeks for another clinical evaluation.     Saroj Castellon MD  OB/GYN  2:54 PM

## 2024-08-25 NOTE — PROGRESS NOTES
OB/GYN Postpartum Appointment    ASSESSMENT / PLAN:    Ruth Self is a 33 y.o.  female who delivered a female  (5lb 0.1oz) via repeat low transverse  on 24, doing well today.     Contraception: POPs, will transition to OCPs  Breast and bottle feeding  Information for [facility] reviewed  Increase activity as tolerated, may resume all normal activity including sexual activity at 6 weeks postpartum  Next pap smear due 2026    SUBJECTIVE:    Ruth Self is a 33 y.o.  who presents for postpartum appointment.  Her pregnancy was complicated by severe fetal growth restriction, obesity, anemia, and a history of epilepsy.  She delivered via RLTCS on 24.   5 lb 0.1oz female.  She has no bleeding  Bowel and bladder function are normal.  Patient has not been sexually active.  Counseled that normal sexual activity may be resumed starting at 6 weeks postpartum.  Contraception: POPs.  Postpartum depression screening: negative, EPDS: 0.      Current Outpatient Medications:   •  Ferrous Fumarate 324 (106 Fe) MG TABS, TOME 1 TABLETA POR VIA ORAL TODOS LOS COOK EN LA [facility], Disp: 90 tablet, Rfl: 1  •  folic acid (FOLVITE) 1 mg tablet, TOME DOS TABLETAS POR V A ORAL TODOS LOS D AS, Disp: , Rfl:   •  levETIRAcetam (KEPPRA) 500 mg tablet, Take 2 tablets (1,000 mg total) by mouth daily, Disp: , Rfl:   •  norethindrone-ethinyl estradiol (Junel FE ) 1-20 MG-MCG per tablet, Take 1 tablet by mouth daily, Disp: 180 tablet, Rfl: 0  •  Prenatal Vit-Fe Fumarate-FA (PRENATAL VITAMINS PO), Take by mouth daily, Disp: , Rfl:   •  ibuprofen (MOTRIN) 600 mg tablet, Take 1 tablet (600 mg total) by mouth every 6 (six) hours (Patient not taking: Reported on 2024), Disp: 30 tablet, Rfl: 0  •  polyethylene glycol (GLYCOLAX) 17 GM/SCOOP powder, DISSOLVE 17 GRAMS IN 8 OZ OF FLUID LIQUID DRINK DAILY AS DIRECTED (Patient not taking: Reported on 2024), Disp: 238 g,  "Rfl: 0    No Known Allergies    OBJECTIVE:    /82 (BP Location: Left arm, Patient Position: Sitting, Cuff Size: Standard)   Pulse 78   Ht 5' 6\" (1.676 m)   Wt 111 kg (243 lb 12.8 oz)   LMP 11/01/2023 (Exact Date)   Breastfeeding Yes Comment: She is breast and bottle feeding  BMI 39.35 kg/m²     General: Well appearing, no distress  Respiratory: Unlabored breathing  Cardiovascular: Regular rate.  Abdomen: Soft, nontender, Incision healing well  Extremities: Warm and well perfused.  Non tender.      Sherley Christine MD  08/27/24  8:17 AM        "

## 2024-08-27 ENCOUNTER — POSTPARTUM VISIT (OUTPATIENT)
Dept: OBGYN CLINIC | Facility: CLINIC | Age: 34
End: 2024-08-27

## 2024-08-27 VITALS
DIASTOLIC BLOOD PRESSURE: 82 MMHG | HEART RATE: 78 BPM | SYSTOLIC BLOOD PRESSURE: 122 MMHG | HEIGHT: 66 IN | WEIGHT: 243.8 LBS | BODY MASS INDEX: 39.18 KG/M2

## 2024-08-27 DIAGNOSIS — Z30.41 ENCOUNTER FOR BIRTH CONTROL PILLS MAINTENANCE: ICD-10-CM

## 2024-08-27 DIAGNOSIS — Z98.891 STATUS POST REPEAT LOW TRANSVERSE CESAREAN SECTION: Primary | ICD-10-CM

## 2024-08-27 PROCEDURE — 99212 OFFICE O/P EST SF 10 MIN: CPT | Performed by: OBSTETRICS & GYNECOLOGY

## 2024-08-27 RX ORDER — NORETHINDRONE ACETATE AND ETHINYL ESTRADIOL AND FERROUS FUMARATE 1MG-20(21)
1 KIT ORAL DAILY
Qty: 180 TABLET | Refills: 0 | Status: SHIPPED | OUTPATIENT
Start: 2024-08-27 | End: 2025-02-23

## 2024-11-19 ENCOUNTER — ANNUAL EXAM (OUTPATIENT)
Dept: OBGYN CLINIC | Facility: CLINIC | Age: 34
End: 2024-11-19

## 2024-11-19 VITALS
HEART RATE: 88 BPM | BODY MASS INDEX: 41.33 KG/M2 | HEIGHT: 66 IN | DIASTOLIC BLOOD PRESSURE: 83 MMHG | WEIGHT: 257.2 LBS | SYSTOLIC BLOOD PRESSURE: 124 MMHG

## 2024-11-19 DIAGNOSIS — Z20.2 POSSIBLE EXPOSURE TO STD: ICD-10-CM

## 2024-11-19 DIAGNOSIS — Z23 NEED FOR HPV VACCINE: ICD-10-CM

## 2024-11-19 DIAGNOSIS — Z01.419 ROUTINE GYNECOLOGICAL EXAMINATION: Primary | ICD-10-CM

## 2024-11-19 DIAGNOSIS — Z12.4 SCREENING FOR CERVICAL CANCER: ICD-10-CM

## 2024-11-19 PROCEDURE — G0145 SCR C/V CYTO,THINLAYER,RESCR: HCPCS | Performed by: OBSTETRICS & GYNECOLOGY

## 2024-11-19 PROCEDURE — 87591 N.GONORRHOEAE DNA AMP PROB: CPT | Performed by: OBSTETRICS & GYNECOLOGY

## 2024-11-19 PROCEDURE — 90471 IMMUNIZATION ADMIN: CPT | Performed by: OBSTETRICS & GYNECOLOGY

## 2024-11-19 PROCEDURE — 87491 CHLMYD TRACH DNA AMP PROBE: CPT | Performed by: OBSTETRICS & GYNECOLOGY

## 2024-11-19 PROCEDURE — 90651 9VHPV VACCINE 2/3 DOSE IM: CPT | Performed by: OBSTETRICS & GYNECOLOGY

## 2024-11-19 PROCEDURE — S0612 ANNUAL GYNECOLOGICAL EXAMINA: HCPCS | Performed by: OBSTETRICS & GYNECOLOGY

## 2024-11-19 PROCEDURE — G0476 HPV COMBO ASSAY CA SCREEN: HCPCS | Performed by: OBSTETRICS & GYNECOLOGY

## 2024-11-19 NOTE — PROGRESS NOTES
HPV given to patient in left arm on 11/19/2024.    NDC:2166-0724-08  LOT: [id number]  EXP: 01/01/2027

## 2024-11-19 NOTE — PROGRESS NOTES
ANNUAL GYNECOLOGICAL EXAMINATION    Ruth Self is a 33 y.o. female who presents today for annual GYN exam.  Her last pap smear was performed 21 and result was negative.  She reports no history of abnormal pap smears in her past.   She had HIV screening performed 23 and it was negative.  She reports menses as regular.  Patient's last menstrual period was 10/27/2024 (exact date).  Her general medical history has been reviewed and she reports it as follows:    Past Medical History:   Diagnosis Date    Obesity affecting pregnancy in third trimester     Seizures (HCC)      Past Surgical History:   Procedure Laterality Date    ANKLE SURGERY Right      SECTION      OK  DELIVERY ONLY N/A 2016    Procedure:  SECTION ();  Surgeon: Jaylon Granados MD;  Location: [facility];  Service: Obstetrics    OK  DELIVERY ONLY N/A 2024    Procedure:  SECTION () REPEAT;  Surgeon: Belén Bray MD;  Location: [facility];  Service: Obstetrics     OB History          3    Para   2    Term   2       0    AB   1    Living   2         SAB   1    IAB   0    Ectopic   0    Multiple   0    Live Births   2               Social History     Tobacco Use    Smoking status: Never     Passive exposure: Never    Smokeless tobacco: Never   Vaping Use    Vaping status: Never Used   Substance Use Topics    Alcohol use: No    Drug use: Never     Social History     Substance and Sexual Activity   Sexual Activity Yes    Partners: Male    Birth control/protection: None     Cancer-related family history is negative for Cancer, Ovarian cancer, Colon cancer, and Breast cancer.    Current Outpatient Medications   Medication Instructions    Ferrous Fumarate 324 (106 Fe) MG TABS TOME 1 TABLETA POR VIA ORAL TODOS LOS COOK EN LA MANANA    folic acid (FOLVITE) 1 mg tablet TOME DOS TABLETAS POR V A ORAL TODOS LOS D AS    ibuprofen (MOTRIN) 600 mg, Oral, Every 6 hours  "   levETIRAcetam (KEPPRA) 1,000 mg, Oral, Daily    norethindrone-ethinyl estradiol (Junel FE 1/20) 1-20 MG-MCG per tablet 1 tablet, Oral, Daily    polyethylene glycol (GLYCOLAX) 17 GM/SCOOP powder DISSOLVE 17 GRAMS IN 8 OZ OF FLUID LIQUID DRINK DAILY AS DIRECTED    Prenatal Vit-Fe Fumarate-FA (PRENATAL VITAMINS PO) Daily       Review of Systems:  Review of Systems   Genitourinary:  Negative for menstrual problem, pelvic pain, vaginal bleeding and vaginal discharge.   All other systems reviewed and are negative.      Physical Exam:  /83 (BP Location: Left arm, Patient Position: Sitting)   Pulse 88   Ht 5' 6\" (1.676 m)   Wt 117 kg (257 lb 3.2 oz)   LMP 10/27/2024 (Exact Date)   BMI 41.51 kg/m²   Physical Exam  Constitutional:       Appearance: Normal appearance.   Genitourinary:      Bladder and urethral meatus normal.      No lesions in the vagina.      Right Labia: No rash, tenderness or lesions.     Left Labia: No tenderness, lesions or rash.     No inguinal adenopathy present in the right or left side.     No vaginal discharge.        Right Adnexa: not tender, not full and no mass present.     Left Adnexa: not tender, not full and no mass present.     No cervical motion tenderness, discharge or lesion.      Uterus is not enlarged or tender.      No uterine mass detected.     No urethral tenderness or mass present.   Breasts:     Breasts are soft.     Right: No swelling, inverted nipple, mass, nipple discharge, skin change or tenderness.      Left: No swelling, inverted nipple, mass, nipple discharge, skin change or tenderness.   HENT:      Head: Normocephalic and atraumatic.   Cardiovascular:      Rate and Rhythm: Normal rate and regular rhythm.   Pulmonary:      Effort: Pulmonary effort is normal.      Breath sounds: Normal breath sounds.   Abdominal:      General: Bowel sounds are normal.      Palpations: Abdomen is soft.      Hernia: There is no hernia in the left inguinal area or right inguinal " area.   Musculoskeletal:         General: Normal range of motion.      Cervical back: Normal range of motion and neck supple.   Lymphadenopathy:      Lower Body: No right inguinal adenopathy. No left inguinal adenopathy.   Neurological:      Mental Status: She is alert and oriented to person, place, and time.   Skin:     General: Skin is warm and dry.   Psychiatric:         Mood and Affect: Mood normal.   Vitals reviewed.           Assessment/Plan:   1. Normal well-woman GYN exam.  2. Cervical cancer screening:  Normal cervical exam.  Pap smear done with HPV co-testing.  Has not received HPV vaccine in the past, but she desires to initiate vaccine series now.  Given HPV vaccine today and she will return in 2 and 6 months for subsequent vaccinations.     3. STD screening:   Orders placed for vaginal GC/CT cultures.     4. Breast cancer screening:  Normal breast exam.    Reviewed breast self-awareness.   5. Depression Screening: Patient's depression screening was assessed with a PHQ-2 score of 0. Clinically patient does not have depression. No treatment is required.     6. BMI Counseling: Body mass index is 41.51 kg/m². Discussed the patient's BMI with her. The BMI is above normal. Nutrition recommendations include decreasing overall calorie intake, decreasing soda and/or juice intake, moderation in carbohydrate intake, increasing intake of lean protein, reducing intake of saturated fat and trans fat, and reducing intake of cholesterol.   7. Contraception:  none   8. Return to office 1yr/.    Reviewed with patient that test results are available in [facility] immediately, but that they will not necessarily be reviewed by me immediately.  Explained that I will review results at my earliest opportunity and contact patient appropriately.

## 2024-11-19 NOTE — PATIENT INSTRUCTIONS
Evelyne por herndon confianza en nuestro equipo.   Le agradecemos y agradecemos sarina comentarios.   Si recibe shorty encuesta nuestra, tómese unos momentos para informarnos cómo estamos.   Sinceramente,  Yamalike Toussaint-Foster, DO

## 2024-11-21 LAB
C TRACH DNA SPEC QL NAA+PROBE: NEGATIVE
N GONORRHOEA DNA SPEC QL NAA+PROBE: NEGATIVE

## 2024-11-22 LAB
LAB AP GYN PRIMARY INTERPRETATION: NORMAL
Lab: NORMAL

## 2025-01-19 DIAGNOSIS — O99.013 ANEMIA AFFECTING PREGNANCY IN THIRD TRIMESTER: ICD-10-CM

## 2025-01-19 RX ORDER — FERROUS FUMARATE 324(106)MG
TABLET ORAL
Qty: 100 TABLET | Refills: 1 | Status: SHIPPED | OUTPATIENT
Start: 2025-01-19

## 2025-01-23 ENCOUNTER — TELEPHONE (OUTPATIENT)
Dept: OBGYN CLINIC | Facility: CLINIC | Age: 35
End: 2025-01-23
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
## 2025-01-24 ENCOUNTER — CLINICAL SUPPORT (OUTPATIENT)
Dept: OBGYN CLINIC | Facility: CLINIC | Age: 35
End: 2025-01-24

## 2025-01-24 VITALS — BODY MASS INDEX: 43.58 KG/M2 | DIASTOLIC BLOOD PRESSURE: 90 MMHG | WEIGHT: 270 LBS | SYSTOLIC BLOOD PRESSURE: 142 MMHG

## 2025-01-24 DIAGNOSIS — Z23 NEED FOR HPV VACCINE: Primary | ICD-10-CM

## 2025-01-24 PROCEDURE — 90471 IMMUNIZATION ADMIN: CPT

## 2025-01-24 PROCEDURE — 90651 9VHPV VACCINE 2/3 DOSE IM: CPT

## 2025-04-14 DIAGNOSIS — Z30.41 ENCOUNTER FOR BIRTH CONTROL PILLS MAINTENANCE: ICD-10-CM

## 2025-04-14 RX ORDER — NORETHINDRONE ACETATE AND ETHINYL ESTRADIOL AND FERROUS FUMARATE 1MG-20(21)
KIT ORAL
Qty: 84 TABLET | Refills: 2 | Status: SHIPPED | OUTPATIENT
Start: 2025-04-14

## 2025-05-30 ENCOUNTER — CLINICAL SUPPORT (OUTPATIENT)
Dept: OBGYN CLINIC | Facility: CLINIC | Age: 35
End: 2025-05-30

## 2025-05-30 VITALS — SYSTOLIC BLOOD PRESSURE: 124 MMHG | WEIGHT: 277.8 LBS | BODY MASS INDEX: 44.84 KG/M2 | DIASTOLIC BLOOD PRESSURE: 84 MMHG

## 2025-05-30 DIAGNOSIS — Z23 NEED FOR HPV VACCINE: Primary | ICD-10-CM

## 2025-05-30 NOTE — PROGRESS NOTES
HPV given to patient in left arm on 5/30/2025.    NDC: 5679-8995-03  EXP: 02/01/2027  LOT: [id number]

## 2025-06-02 DIAGNOSIS — Z00.6 ENCOUNTER FOR EXAMINATION FOR NORMAL COMPARISON OR CONTROL IN CLINICAL RESEARCH PROGRAM: ICD-10-CM

## 2025-06-03 ENCOUNTER — APPOINTMENT (OUTPATIENT)
Dept: LAB | Facility: CLINIC | Age: 35
End: 2025-06-03

## 2025-06-03 DIAGNOSIS — Z00.6 ENCOUNTER FOR EXAMINATION FOR NORMAL COMPARISON OR CONTROL IN CLINICAL RESEARCH PROGRAM: ICD-10-CM

## 2025-06-03 PROCEDURE — 36415 COLL VENOUS BLD VENIPUNCTURE: CPT

## 2025-06-15 LAB
APOB+LDLR+PCSK9 GENE MUT ANL BLD/T: NOT DETECTED
BRCA1+BRCA2 DEL+DUP + FULL MUT ANL BLD/T: NOT DETECTED
MLH1+MSH2+MSH6+PMS2 GN DEL+DUP+FUL M: NOT DETECTED

## 2025-06-20 ENCOUNTER — OFFICE VISIT (OUTPATIENT)
Dept: FAMILY MEDICINE CLINIC | Facility: CLINIC | Age: 35
End: 2025-06-20
Payer: MEDICARE

## 2025-06-20 VITALS
BODY MASS INDEX: 44.1 KG/M2 | SYSTOLIC BLOOD PRESSURE: 142 MMHG | HEART RATE: 79 BPM | HEIGHT: 66 IN | TEMPERATURE: 97.6 F | WEIGHT: 274.4 LBS | DIASTOLIC BLOOD PRESSURE: 90 MMHG | OXYGEN SATURATION: 97 %

## 2025-06-20 DIAGNOSIS — G89.29 CHRONIC MIDLINE LOW BACK PAIN WITHOUT SCIATICA: ICD-10-CM

## 2025-06-20 DIAGNOSIS — Z13.6 SCREENING FOR CARDIOVASCULAR CONDITION: ICD-10-CM

## 2025-06-20 DIAGNOSIS — M54.50 CHRONIC MIDLINE LOW BACK PAIN WITHOUT SCIATICA: ICD-10-CM

## 2025-06-20 DIAGNOSIS — Z13.1 SCREENING FOR DIABETES MELLITUS: ICD-10-CM

## 2025-06-20 DIAGNOSIS — Z78.9 LANGUAGE BARRIER TO COMMUNICATION: ICD-10-CM

## 2025-06-20 DIAGNOSIS — Z00.00 ANNUAL PHYSICAL EXAM: Primary | ICD-10-CM

## 2025-06-20 DIAGNOSIS — I10 PRIMARY HYPERTENSION: ICD-10-CM

## 2025-06-20 PROBLEM — O36.5930 INTRAUTERINE GROWTH RESTRICTION (IUGR) AFFECTING CARE OF MOTHER, THIRD TRIMESTER, SINGLE GESTATION: Status: RESOLVED | Noted: 2024-04-25 | Resolved: 2025-06-20

## 2025-06-20 PROBLEM — O34.219 HISTORY OF CESAREAN DELIVERY, ANTEPARTUM: Status: RESOLVED | Noted: 2024-01-23 | Resolved: 2025-06-20

## 2025-06-20 PROBLEM — O09.529 ADVANCED MATERNAL AGE IN MULTIGRAVIDA: Status: RESOLVED | Noted: 2024-01-23 | Resolved: 2025-06-20

## 2025-06-20 PROBLEM — R10.2 PELVIC PAIN: Chronic | Status: RESOLVED | Noted: 2019-02-07 | Resolved: 2025-06-20

## 2025-06-20 PROBLEM — Z98.891 STATUS POST REPEAT LOW TRANSVERSE CESAREAN SECTION: Status: RESOLVED | Noted: 2024-01-24 | Resolved: 2025-06-20

## 2025-06-20 PROBLEM — O99.210 OBESITY IN PREGNANCY, ANTEPARTUM: Status: RESOLVED | Noted: 2024-01-23 | Resolved: 2025-06-20

## 2025-06-20 PROCEDURE — 99214 OFFICE O/P EST MOD 30 MIN: CPT

## 2025-06-20 PROCEDURE — 99385 PREV VISIT NEW AGE 18-39: CPT

## 2025-06-20 RX ORDER — LISINOPRIL 5 MG/1
5 TABLET ORAL DAILY
Qty: 90 TABLET | Refills: 0 | Status: SHIPPED | OUTPATIENT
Start: 2025-06-20

## 2025-06-20 NOTE — PROGRESS NOTES
Answers submitted by the patient for this visit:  Back Pain Questionnaire (Submitted on 2025)  Chief Complaint: Back pain  Chronicity: new  Onset: more than 1 month ago  Frequency: intermittently  Progression since onset: rapidly worsening  Pain location: lumbar spine  Pain quality: burning  Radiates to: left foot  Pain - numeric: 5/10  Pain is: worse during the day  Aggravated by: position  Stiffness is present: all day  bladder incontinence: No  bowel incontinence: No  leg pain: No  perianal numbness: No  paresis: No  paresthesias: Yes  tingling: No  weight loss: No  Risk factors: lack of exercise, obesity, poor posture, sedentary lifestyle  Adult Annual Physical  Name: Ruth Self      : 1990      MRN: [id number]  Encounter Provider: PILY Martinez  Encounter Date: 2025   Encounter department: [facility]    :  Assessment & Plan  Annual physical exam    Orders:    Lipid panel; Future    CBC and differential; Future    Comprehensive metabolic panel; Future    TSH, 3rd generation with Free T4 reflex; Future    Vitamin D 25 hydroxy; Future    Primary hypertension  Today's /90   goal bp 130/80, pt's bp is not at goal  Current medication: none   New medication: Lisinopril 5mg   New diagnosis of hypertension including lifestyle modifications and new medications discussed.   Advised patient on symptoms of hypotension & severe HTN  Limit salt-intake & caffeine. DASH diet discussed, minimize stress level  Weight reduction efforts via improved diet & increased exercise  Last urine microalbumin > than 1 year ago    Orders:    lisinopril (ZESTRIL) 5 mg tablet; Take 1 tablet (5 mg total) by mouth daily    Albumin / creatinine urine ratio; Future    Chronic midline low back pain without sciatica  Ruth Self is a 34 y.o. female who presents for evaluation of low back pain. The patient has had recurrent self limited episodes of low back pain in  "the past. Symptoms have been present for a few months and are unchanged.  Onset was related to / precipitated by no known injury and post . The pain is located in the lumbar area and does not radiate. The pain is described as aching and occurs intermittently.  Symptoms are exacerbated by standing and walking for more than several minutes. Symptoms are improved by rest. She has also tried massage gun which provided no symptom relief. She has no other symptoms associated with the back pain. The patient has no \"red flag\" history indicative of complicated back pain..    Nonspecific acute low back pain.    Natural history and expected course discussed. Questions answered.  Educational material distributed.  Proper lifting, bending technique discussed.  Stretching exercises discussed.  Regular aerobic and trunk strengthening exercises discussed.  Ice to affected area as needed for local pain relief.  Heat to affected area as needed for local pain relief.  PT referral..  Orders:    Ambulatory Referral to Physical Therapy; Future    XR spine lumbar minimum 4 views non injury; Future    Language barrier to communication  Macedonian speaking    required    Office visit   [place] [id number]       BMI 40.0-44.9, adult ([facility])  Wt Readings from Last 3 Encounters:   25 124 kg (274 lb 6.4 oz)   25 126 kg (277 lb 12.8 oz)   25 122 kg (270 lb)     Orders:    Lipid panel; Future    CBC and differential; Future    Comprehensive metabolic panel; Future    TSH, 3rd generation with Free T4 reflex; Future    Vitamin D 25 hydroxy; Future    Screening for diabetes mellitus    Orders:    Hemoglobin A1C; Future    Screening for cardiovascular condition    Orders:    Lipid panel; Future        Preventive Screenings:  - Diabetes Screening: risks/benefits discussed  - Cholesterol Screening: risks/benefits discussed   - Hepatitis C screening: screening up-to-date   - HIV screening: screening up-to-date   - " Cervical cancer screening: screening up-to-date   - Colon cancer screening: risks/benefits discussed   - Lung cancer screening: screening not indicated     Counseling/Anticipatory Guidance:  - Alcohol: discussed moderation in alcohol intake and recommendations for healthy alcohol use.   - Drug use: discussed harms of illicit drug use and how it can negatively impact mental/physical health.   - Tobacco use: discussed harms of tobacco use and management options for quitting.   - Dental health: discussed importance of regular tooth brushing, flossing, and dental visits.   - Sexual health: discussed sexually transmitted diseases, partner selection, use of condoms, avoidance of unintended pregnancy, and contraceptive alternatives.   - Diet: discussed recommendations for a healthy/well-balanced diet.   - Exercise: the importance of regular exercise/physical activity was discussed. Recommend exercise 3-5 times per week for at least 30 minutes.   - Injury prevention: discussed safety/seat belts, safety helmets, smoke detectors, carbon monoxide detectors, and smoking near bedding or upholstery.       Depression Screening and Follow-up Plan: Patient was screened for depression during today's encounter. They screened negative with a PHQ-2 score of 0.          History of Present Illness     Back Pain  This is a new problem. The current episode started more than 1 month ago. The problem occurs intermittently. The problem has been rapidly worsening since onset. The pain is present in the lumbar spine. The quality of the pain is described as burning. The pain radiates to the left foot. The pain is at a severity of 5/10. The pain is Worse during the day. The symptoms are aggravated by position. Stiffness is present All day. Associated symptoms include headaches, leg pain and paresthesias. Pertinent negatives include no abdominal pain, bladder incontinence, bowel incontinence, chest pain, dysuria, fever, numbness, paresis, pelvic  pain, perianal numbness, tingling, weakness or weight loss. Risk factors include lack of exercise, obesity, poor posture and sedentary lifestyle. Treatments tried: massage gun. The treatment provided no relief.   Leg Pain   The incident occurred more than 1 week ago. There was no injury mechanism. The pain is present in the left leg and right leg. The pain is at a severity of 1/10. Pertinent negatives include no inability to bear weight, loss of motion, loss of sensation, muscle weakness, numbness or tingling. She reports no foreign bodies present. Nothing aggravates the symptoms. The treatment provided no relief.      Adult Annual Physical:  Patient presents for annual physical.     Diet and Physical Activity:  - Diet/Nutrition: no special diet.  - Exercise: no formal exercise.    Depression Screening:  - PHQ-2 Score: 0    General Health:  - Sleep: 4-6 hours of sleep on average, sleeps poorly and snores loudly.  - Hearing: normal hearing right ear and normal hearing left ear.  - Vision: no vision problems and wears glasses.  - Dental: regular dental visits, brushes teeth once daily and floss regularly.    /GYN Health:  - Follows with GYN: yes.   - Last menstrual cycle: 6/7/2025.   - History of STDs: no    Advanced Care Planning:  - Has an advanced directive?: no    - Has a durable medical POA?: no    - ACP document given to patient?: no      Review of Systems   Constitutional:  Negative for activity change, chills, fatigue, fever and weight loss.   HENT:  Negative for congestion, ear pain, rhinorrhea, sore throat and trouble swallowing.    Eyes:  Negative for pain and visual disturbance.   Respiratory:  Negative for cough, chest tightness and shortness of breath.    Cardiovascular:  Negative for chest pain, palpitations and leg swelling.   Gastrointestinal:  Negative for abdominal pain, bowel incontinence, constipation, diarrhea, nausea and vomiting.   Genitourinary:  Negative for bladder incontinence, difficulty  "urinating, dysuria, hematuria, pelvic pain and urgency.   Musculoskeletal:  Positive for back pain. Negative for arthralgias.   Skin:  Negative for color change and rash.   Neurological:  Positive for headaches and paresthesias. Negative for dizziness, tingling, seizures, syncope, weakness and numbness.   Psychiatric/Behavioral:  Negative for dysphoric mood. The patient is not nervous/anxious.    All other systems reviewed and are negative.    Medical History Reviewed by provider this encounter:  Tobacco  Allergies  Meds  Problems  Med Hx  Surg Hx  Fam Hx     .    Objective   /90 (BP Location: Left arm, Patient Position: Sitting, Cuff Size: Large)   Pulse 79   Temp 97.6 °F (36.4 °C) (Temporal)   Ht 5' 6\" (1.676 m)   Wt 124 kg (274 lb 6.4 oz)   LMP 05/10/2025 (Exact Date)   SpO2 97%   Breastfeeding No   BMI 44.29 kg/m²     Physical Exam  Vitals and nursing note reviewed.   Constitutional:       General: She is not in acute distress.     Appearance: Normal appearance. She is well-developed. She is obese.   HENT:      Head: Normocephalic and atraumatic.      Right Ear: Tympanic membrane, ear canal and external ear normal. There is no impacted cerumen.      Left Ear: Tympanic membrane, ear canal and external ear normal. There is no impacted cerumen.      Nose: Nose normal.      Mouth/Throat:      Mouth: Mucous membranes are moist.      Pharynx: Oropharynx is clear.     Eyes:      Extraocular Movements: Extraocular movements intact.      Conjunctiva/sclera: Conjunctivae normal.      Pupils: Pupils are equal, round, and reactive to light.       Cardiovascular:      Rate and Rhythm: Normal rate and regular rhythm.      Pulses: Normal pulses.      Heart sounds: Normal heart sounds. No murmur heard.  Pulmonary:      Effort: Pulmonary effort is normal. No respiratory distress.      Breath sounds: Normal breath sounds.   Abdominal:      General: Bowel sounds are normal.      Palpations: Abdomen is soft.    "   Tenderness: There is no abdominal tenderness.     Musculoskeletal:         General: Normal range of motion.      Cervical back: Normal range of motion and neck supple.      Right lower leg: No edema.      Left lower leg: No edema.     Skin:     General: Skin is warm and dry.      Capillary Refill: Capillary refill takes less than 2 seconds.     Neurological:      General: No focal deficit present.      Mental Status: She is alert and oriented to person, place, and time. Mental status is at baseline.     Psychiatric:         Mood and Affect: Mood normal.         Behavior: Behavior normal.         Thought Content: Thought content normal.         Judgment: Judgment normal.       Administrative Statements   I have spent a total time of 40 minutes in caring for this patient on the day of the visit/encounter including Diagnostic results, Prognosis, Risks and benefits of tx options, Instructions for management, Patient and family education, Importance of tx compliance, Risk factor reductions, Impressions, Counseling / Coordination of care, Documenting in the medical record, Reviewing/placing orders in the medical record (including tests, medications, and/or procedures), Obtaining or reviewing history  , and Communicating with other healthcare professionals .    Patient Instructions   Patient Education     Lisinopril (lyse IN oh pril)   Brand Names: US Prinivil [DSC]; Qbrelis; Zestril   Brand Names: Silvia APO-Lisinopril; Auro-Lisinopril; Prinivil [DSC]; PRO-Lisinopril-10; PRO-Lisinopril-20; PRO-Lisinopril-5; RAN-Lisinopril; SANDOZ Lisinopril [DSC]; TEVA-Lisinopril (Type P) [DSC]; TEVA-Lisinopril (Type Z); Zestril   Warning   Do not take if you are pregnant. Use during pregnancy may cause birth defects or loss of the unborn baby. If you get pregnant or plan on getting pregnant while taking this drug, call your doctor right away.  What is this drug used for?   It is used to treat high blood pressure.  It is used to treat  heart failure (weak heart).  It is used to help heart function after a heart attack.  It may be given to you for other reasons. Talk with the doctor.  What do I need to tell my doctor BEFORE I take this drug?   For all patients taking this drug:   If you are allergic to this drug; any part of this drug; or any other drugs, foods, or substances. Tell your doctor about the allergy and what signs you had.  If you have ever had a very bad or life-threatening reaction called angioedema. Signs may be swelling of the hands, face, lips, eyes, tongue, or throat; trouble breathing; trouble swallowing; unusual hoarseness.  If you have kidney problems.  If you are taking a drug that has aliskiren in it and you also have diabetes or kidney problems.  If you have taken a drug that has sacubitril in it in the last 36 hours.  If you are breast-feeding. Do not breast-feed while you take this drug.  Children:   If your child is younger than 6 years of age. Do not give this drug to a child younger than 6 years of age.  This is not a list of all drugs or health problems that interact with this drug.  Tell your doctor and pharmacist about all of your drugs (prescription or OTC, natural products, vitamins) and health problems. You must check to make sure that it is safe for you to take this drug with all of your drugs and health problems. Do not start, stop, or change the dose of any drug without checking with your doctor.  What are some things I need to know or do while I take this drug?   Tell all of your health care providers that you take this drug. This includes your doctors, nurses, pharmacists, and dentists.  Avoid driving and doing other tasks or actions that call for you to be alert until you see how this drug affects you.  To lower the chance of feeling dizzy or passing out, rise slowly if you have been sitting or lying down. Be careful going up and down stairs.  If you have high blood sugar (diabetes), you will need to watch  your blood sugar closely.  Check your blood pressure as you have been told.  Have your blood work and other lab tests checked as you have been told by your doctor.  If you are taking a salt substitute that has potassium in it, a potassium-sparing diuretic, or a potassium product, talk with your doctor.  If you are on a low-salt or salt-free diet, talk with your doctor.  If you are taking this drug and have high blood pressure, talk with your doctor before using OTC products that may raise blood pressure. These include cough or cold drugs, diet pills, stimulants, non-steroidal anti-inflammatory drugs (NSAIDs) like ibuprofen or naproxen, and some natural products or aids.  Talk with your doctor before you drink alcohol.  Be careful in hot weather or while being active. Drink lots of fluids to stop fluid loss.  Tell your doctor if you have too much sweat, fluid loss, throwing up, or loose stools. This may lead to low blood pressure.  This drug may not work as well to lower blood pressure in Black patients. Sometimes another drug may need to be given with this drug. If you have any questions, talk with the doctor.  A severe and sometimes deadly reaction called angioedema has happened. The chance of angioedema may be higher in Black patients.  Low white blood cell counts have happened with captopril, a drug like this one. This may lead to more chance of getting an infection. Most of the time, this has happened in people with kidney problems, mainly if they have certain other health problems. Call your doctor right away if you have signs of infection like fever, chills, or sore throat.  If you are 65 or older, use this drug with care. You could have more side effects.  What are some side effects that I need to call my doctor about right away?   WARNING/CAUTION: Even though it may be rare, some people may have very bad and sometimes deadly side effects when taking a drug. Tell your doctor or get medical help right away if  you have any of the following signs or symptoms that may be related to a very bad side effect:  Signs of an allergic reaction, like rash; hives; itching; red, swollen, blistered, or peeling skin with or without fever; wheezing; tightness in the chest or throat; trouble breathing, swallowing, or talking; unusual hoarseness; or swelling of the mouth, face, lips, tongue, or throat.  Signs of kidney problems like unable to pass urine, change in how much urine is passed, blood in the urine, or a big weight gain.  Signs of high potassium levels like a heartbeat that does not feel normal; feeling confused; feeling weak, lightheaded, or dizzy; feeling like passing out; numbness or tingling; or shortness of breath.  Severe dizziness or passing out.  Cough that does not go away.  Severe stomach pain.  Severe upset stomach or throwing up.  Chest pain or pressure.  Liver problems have happened with drugs like this one. Sometimes, this has been deadly. Call your doctor right away if you have signs of liver problems like dark urine, tiredness, decreased appetite, upset stomach or stomach pain, light-colored stools, throwing up, or yellow skin or eyes.  What are some other side effects of this drug?   All drugs may cause side effects. However, many people have no side effects or only have minor side effects. Call your doctor or get medical help if any of these side effects or any other side effects bother you or do not go away:  Dizziness or headache.  Cough.  These are not all of the side effects that may occur. If you have questions about side effects, call your doctor. Call your doctor for medical advice about side effects.  You may report side effects to your national health agency.  You may report side effects to the FDA at 1-799.632.5616. You may also report side effects at https://www.fda.gov/medwatch.  How is this drug best taken?   Use this drug as ordered by your doctor. Read all information given to you. Follow all  instructions closely.  All products:   Take with or without food.  Take this drug at the same time of day.  Keep taking this drug as you have been told by your doctor or other health care provider, even if you feel well.  Drink lots of noncaffeine liquids unless told to drink less liquid by your doctor.  Tablets:   A liquid (suspension) can be made if you cannot swallow pills. Talk with your doctor or pharmacist.  If a liquid (suspension) is made, shake well before use.  All liquid products:   Measure liquid doses carefully. Use the measuring device that comes with this drug. If there is none, ask the pharmacist for a device to measure this drug.  What do I do if I miss a dose?   Take a missed dose as soon as you think about it.  If it is close to the time for your next dose, skip the missed dose and go back to your normal time.  Do not take 2 doses at the same time or extra doses.  How do I store and/or throw out this drug?   All products:   Store at room temperature in a dry place. Do not store in a bathroom.  Keep lid tightly closed.  Keep all drugs in a safe place. Keep all drugs out of the reach of children and pets.  Throw away unused or  drugs. Do not flush down a toilet or pour down a drain unless you are told to do so. Check with your pharmacist if you have questions about the best way to throw out drugs. There may be drug take-back programs in your area.  Tablets:   If a liquid (suspension) is made from the tablets, throw away any part not used after 28 days.  Liquid (solution):   Do not freeze.  Protect from heat.  General drug facts   If your symptoms or health problems do not get better or if they become worse, call your doctor.  Do not share your drugs with others and do not take anyone else's drugs.  Some drugs may have another patient information leaflet. If you have any questions about this drug, please talk with your doctor, nurse, pharmacist, or other health care provider.  Some drugs may  have another patient information leaflet. Check with your pharmacist. If you have any questions about this drug, please talk with your doctor, nurse, pharmacist, or other health care provider.  If you think there has been an overdose, call your poison control center or get medical care right away. Be ready to tell or show what was taken, how much, and when it happened.  Consumer Information Use and Disclaimer   This generalized information is a limited summary of diagnosis, treatment, and/or medication information. It is not meant to be comprehensive and should be used as a tool to help the user understand and/or assess potential diagnostic and treatment options. It does NOT include all information about conditions, treatments, medications, side effects, or risks that may apply to a specific patient. It is not intended to be medical advice or a substitute for the medical advice, diagnosis, or treatment of a health care provider based on the health care provider's examination and assessment of a patient's specific and unique circumstances. Patients must speak with a health care provider for complete information about their health, medical questions, and treatment options, including any risks or benefits regarding use of medications. This information does not endorse any treatments or medications as safe, effective, or approved for treating a specific patient. Up[employer] Inc[employer] and its affiliates disclaim any warranty or liability relating to this information or the use thereof. The use of this information is governed by the Terms of Use, available at https://www.[employer].com/en/know/clinical-effectiveness-terms.  Last Reviewed Date   2023-09-08  Copyright   © 2024 UpToDate, Inc. and its affiliates and/or licensors. All rights reserved.  Patient Education     High blood pressure in adults   The Basics   Written by the doctors and editors at "[employer]   What is high blood pressure? -- High blood pressure is a  "condition that puts you at risk for heart attack, stroke, and kidney disease. It does not usually cause symptoms. But it can be serious.  When your doctor or nurse tells you your blood pressure, they say 2 numbers. For instance, your doctor or nurse might say that your blood pressure is \"130 over 80.\" The top number is the pressure inside your arteries when your heart is jimmy. The bottom number is the pressure inside your arteries when your heart is relaxed.  \"Elevated blood pressure\" is a term doctors or nurses use as a warning. People with elevated blood pressure do not yet have high blood pressure. But their blood pressure is not as low as it should be for good health.  Many experts define high, elevated, and normal blood pressure as follows:   High - Top number of 130 or above and/or bottom number of 80 or above.   Elevated - Top number between 120 and 129 and bottom number of 79 or below.   Normal - Top number of 119 or below and bottom number of 79 or below.  This information is also in the table (table 1).  How can I lower my blood pressure? -- If your doctor or nurse prescribed blood pressure medicine, the most important thing you can do is to take it. If it causes side effects, do not just stop taking it. Instead, talk to your doctor or nurse about the problems it causes. They might be able to lower your dose or switch you to another medicine. If cost is a problem, mention that, too. They might be able to put you on a less expensive medicine. Taking your blood pressure medicine can keep you from having a heart attack or stroke, and it can save your life!  Can I do anything on my own? -- You have a lot of control over your blood pressure. To lower it:   Lose weight (if you are overweight).   Choose a diet low in fat and rich in fruits, vegetables, and low-fat dairy products.   Eat less salt.   Do something active for at least 30 minutes a day on most days of the week.   Drink less alcohol (if you " "drink more than 2 alcoholic drinks per day).  It's also a good idea to get a home blood pressure meter. People who check their own blood pressure at home do better at keeping it low and can sometimes even reduce the amount of medicine they take.  All topics are updated as new evidence becomes available and our peer review process is complete.  This topic retrieved from [employer] on: Feb 26, 2024.  Topic 58210 Version 23.0  Release: 32.2.4 - C32.56  © 2024 Up[employer]Date, Inc. and/or its affiliates. All rights reserved.  table 1: Definition of normal and high blood pressure  Level  Top number  Bottom number    High 130 or above 80 or above   Elevated 120 to 129 79 or below   Normal 119 or below 79 or below   These definitions are from the American College of Cardiology/American Heart Association. Other expert groups might use slightly different definitions.  \"Elevated blood pressure\" is a term doctor or nurses use as a warning. It means you do not yet have high blood pressure, but your blood pressure is not as low as it should be for good health.  Graphic 08603 Version 6.0  Consumer Information Use and Disclaimer   Disclaimer: This generalized information is a limited summary of diagnosis, treatment, and/or medication information. It is not meant to be comprehensive and should be used as a tool to help the user understand and/or assess potential diagnostic and treatment options. It does NOT include all information about conditions, treatments, medications, side effects, or risks that may apply to a specific patient. It is not intended to be medical advice or a substitute for the medical advice, diagnosis, or treatment of a health care provider based on the health care provider's examination and assessment of a patient's specific and unique circumstances. Patients must speak with a health care provider for complete information about their health, medical questions, and treatment options, including any risks or benefits " "regarding use of medications. This information does not endorse any treatments or medications as safe, effective, or approved for treating a specific patient. [employer] and its affiliates disclaim any warranty or liability relating to this information or the use thereof.The use of this information is governed by the Terms of Use, available at https://www.[employer].com/en/know/clinical-effectiveness-terms. 2024© UpToDate, Inc. and its affiliates and/or licensors. All rights reserved.  Copyright   © 2024 UpToDate, Inc. and/or its affiliates. All rights reserved.  Patient Education     Routine physical for adults   The Basics   Written by the doctors and editors at [employer]   What is a physical? -- A physical is a routine visit, or \"check-up,\" with your doctor. You might also hear it called a \"wellness visit\" or \"preventive visit.\"  During each visit, the doctor will:   Ask about your physical and mental health   Ask about your habits, behaviors, and lifestyle   Do an exam   Give you vaccines if needed   Talk to you about any medicines you take   Give advice about your health   Answer your questions  Getting regular check-ups is an important part of taking care of your health. It can help your doctor find and treat any problems you have. But it's also important for preventing health problems.  A routine physical is different from a \"sick visit.\" A sick visit is when you see a doctor because of a health concern or problem. Since physicals are scheduled ahead of time, you can think about what you want to ask the doctor.  How often should I get a physical? -- It depends on your age and health. In general, for people age 21 years and older:   If you are younger than 50 years, you might be able to get a physical every 3 years.   If you are 50 years or older, your doctor might recommend a physical every year.  If you have an ongoing health condition, like diabetes or high blood pressure, your doctor will probably " "want to see you more often.  What happens during a physical? -- In general, each visit will include:   Physical exam - The doctor or nurse will check your height, weight, heart rate, and blood pressure. They will also look at your eyes and ears. They will ask about how you are feeling and whether you have any symptoms that bother you.   Medicines - It's a good idea to bring a list of all the medicines you take to each doctor visit. Your doctor will talk to you about your medicines and answer any questions. Tell them if you are having any side effects that bother you. You should also tell them if you are having trouble paying for any of your medicines.   Habits and behaviors - This includes:   Your diet   Your exercise habits   Whether you smoke, drink alcohol, or use drugs   Whether you are sexually active   Whether you feel safe at home  Your doctor will talk to you about things you can do to improve your health and lower your risk of health problems. They will also offer help and support. For example, if you want to quit smoking, they can give you advice and might prescribe medicines. If you want to improve your diet or get more physical activity, they can help you with this, too.   Lab tests, if needed - The tests you get will depend on your age and situation. For example, your doctor might want to check your:   Cholesterol   Blood sugar   Iron level   Vaccines - The recommended vaccines will depend on your age, health, and what vaccines you already had. Vaccines are very important because they can prevent certain serious or deadly infections.   Discussion of screening - \"Screening\" means checking for diseases or other health problems before they cause symptoms. Your doctor can recommend screening based on your age, risk, and preferences. This might include tests to check for:   Cancer, such as breast, prostate, cervical, ovarian, colorectal, prostate, lung, or skin cancer   Sexually transmitted infections, such " as chlamydia and gonorrhea   Mental health conditions like depression and anxiety  Your doctor will talk to you about the different types of screening tests. They can help you decide which screenings to have. They can also explain what the results might mean.   Answering questions - The physical is a good time to ask the doctor or nurse questions about your health. If needed, they can refer you to other doctors or specialists, too.  Adults older than 65 years often need other care, too. As you get older, your doctor will talk to you about:   How to prevent falling at home   Hearing or vision tests   Memory testing   How to take your medicines safely   Making sure that you have the help and support you need at home  All topics are updated as new evidence becomes available and our peer review process is complete.  This topic retrieved from [employer] on: May 02, 2024.  Topic 143897 Version 1.0  Release: 32.4.3 - C32.122  © 2024 UpToDate, Inc. and/or its affiliates. All rights reserved.  Consumer Information Use and Disclaimer   Disclaimer: This generalized information is a limited summary of diagnosis, treatment, and/or medication information. It is not meant to be comprehensive and should be used as a tool to help the user understand and/or assess potential diagnostic and treatment options. It does NOT include all information about conditions, treatments, medications, side effects, or risks that may apply to a specific patient. It is not intended to be medical advice or a substitute for the medical advice, diagnosis, or treatment of a health care provider based on the health care provider's examination and assessment of a patient's specific and unique circumstances. Patients must speak with a health care provider for complete information about their health, medical questions, and treatment options, including any risks or benefits regarding use of medications. This information does not endorse any treatments or  medications as safe, effective, or approved for treating a specific patient. Up[employer], Inc[employer] and its affiliates disclaim any warranty or liability relating to this information or the use thereof.The use of this information is governed by the Terms of Use, available at https://www.[employer].com/en/know/clinical-effectiveness-terms. 2024© Up[employer]Date, Inc. and its affiliates and/or licensors. All rights reserved.  Copyright   © 2024 UpToDate, Inc. and/or its affiliates. All rights reserved.

## 2025-06-20 NOTE — ASSESSMENT & PLAN NOTE
Wt Readings from Last 3 Encounters:   06/20/25 124 kg (274 lb 6.4 oz)   05/30/25 126 kg (277 lb 12.8 oz)   01/24/25 122 kg (270 lb)     Orders:    Lipid panel; Future    CBC and differential; Future    Comprehensive metabolic panel; Future    TSH, 3rd generation with Free T4 reflex; Future    Vitamin D 25 hydroxy; Future

## 2025-06-20 NOTE — ASSESSMENT & PLAN NOTE
Today's /90   goal bp 130/80, pt's bp is not at goal  Current medication: none   New medication: Lisinopril 5mg   New diagnosis of hypertension including lifestyle modifications and new medications discussed.   Advised patient on symptoms of hypotension & severe HTN  Limit salt-intake & caffeine. DASH diet discussed, minimize stress level  Weight reduction efforts via improved diet & increased exercise  Last urine microalbumin > than 1 year ago    Orders:    lisinopril (ZESTRIL) 5 mg tablet; Take 1 tablet (5 mg total) by mouth daily    Albumin / creatinine urine ratio; Future

## 2025-06-20 NOTE — ASSESSMENT & PLAN NOTE
"Ruth Self is a 34 y.o. female who presents for evaluation of low back pain. The patient has had recurrent self limited episodes of low back pain in the past. Symptoms have been present for a few months and are unchanged.  Onset was related to / precipitated by no known injury and post . The pain is located in the lumbar area and does not radiate. The pain is described as aching and occurs intermittently.  Symptoms are exacerbated by standing and walking for more than several minutes. Symptoms are improved by rest. She has also tried massage gun which provided no symptom relief. She has no other symptoms associated with the back pain. The patient has no \"red flag\" history indicative of complicated back pain..    Nonspecific acute low back pain.    Natural history and expected course discussed. Questions answered.  Educational material distributed.  Proper lifting, bending technique discussed.  Stretching exercises discussed.  Regular aerobic and trunk strengthening exercises discussed.  Ice to affected area as needed for local pain relief.  Heat to affected area as needed for local pain relief.  PT referral..  Orders:    Ambulatory Referral to Physical Therapy; Future    XR spine lumbar minimum 4 views non injury; Future    "

## 2025-06-20 NOTE — PATIENT INSTRUCTIONS
Patient Education     Lisinopril (lyse IN oh pril)   Brand Names: US Prinivil [DSC]; Qbrelis; Zestril   Brand Names: Silvia APO-Lisinopril; Auro-Lisinopril; Prinivil [DSC]; PRO-Lisinopril-10; PRO-Lisinopril-20; PRO-Lisinopril-5; RAN-Lisinopril; SANDOZ Lisinopril [DSC]; TEVA-Lisinopril (Type P) [DSC]; TEVA-Lisinopril (Type Z); Zestril   Warning   Do not take if you are pregnant. Use during pregnancy may cause birth defects or loss of the unborn baby. If you get pregnant or plan on getting pregnant while taking this drug, call your doctor right away.  What is this drug used for?   It is used to treat high blood pressure.  It is used to treat heart failure (weak heart).  It is used to help heart function after a heart attack.  It may be given to you for other reasons. Talk with the doctor.  What do I need to tell my doctor BEFORE I take this drug?   For all patients taking this drug:   If you are allergic to this drug; any part of this drug; or any other drugs, foods, or substances. Tell your doctor about the allergy and what signs you had.  If you have ever had a very bad or life-threatening reaction called angioedema. Signs may be swelling of the hands, face, lips, eyes, tongue, or throat; trouble breathing; trouble swallowing; unusual hoarseness.  If you have kidney problems.  If you are taking a drug that has aliskiren in it and you also have diabetes or kidney problems.  If you have taken a drug that has sacubitril in it in the last 36 hours.  If you are breast-feeding. Do not breast-feed while you take this drug.  Children:   If your child is younger than 6 years of age. Do not give this drug to a child younger than 6 years of age.  This is not a list of all drugs or health problems that interact with this drug.  Tell your doctor and pharmacist about all of your drugs (prescription or OTC, natural products, vitamins) and health problems. You must check to make sure that it is safe for you to take this drug with  all of your drugs and health problems. Do not start, stop, or change the dose of any drug without checking with your doctor.  What are some things I need to know or do while I take this drug?   Tell all of your health care providers that you take this drug. This includes your doctors, nurses, pharmacists, and dentists.  Avoid driving and doing other tasks or actions that call for you to be alert until you see how this drug affects you.  To lower the chance of feeling dizzy or passing out, rise slowly if you have been sitting or lying down. Be careful going up and down stairs.  If you have high blood sugar (diabetes), you will need to watch your blood sugar closely.  Check your blood pressure as you have been told.  Have your blood work and other lab tests checked as you have been told by your doctor.  If you are taking a salt substitute that has potassium in it, a potassium-sparing diuretic, or a potassium product, talk with your doctor.  If you are on a low-salt or salt-free diet, talk with your doctor.  If you are taking this drug and have high blood pressure, talk with your doctor before using OTC products that may raise blood pressure. These include cough or cold drugs, diet pills, stimulants, non-steroidal anti-inflammatory drugs (NSAIDs) like ibuprofen or naproxen, and some natural products or aids.  Talk with your doctor before you drink alcohol.  Be careful in hot weather or while being active. Drink lots of fluids to stop fluid loss.  Tell your doctor if you have too much sweat, fluid loss, throwing up, or loose stools. This may lead to low blood pressure.  This drug may not work as well to lower blood pressure in Black patients. Sometimes another drug may need to be given with this drug. If you have any questions, talk with the doctor.  A severe and sometimes deadly reaction called angioedema has happened. The chance of angioedema may be higher in Black patients.  Low white blood cell counts have happened  with captopril, a drug like this one. This may lead to more chance of getting an infection. Most of the time, this has happened in people with kidney problems, mainly if they have certain other health problems. Call your doctor right away if you have signs of infection like fever, chills, or sore throat.  If you are 65 or older, use this drug with care. You could have more side effects.  What are some side effects that I need to call my doctor about right away?   WARNING/CAUTION: Even though it may be rare, some people may have very bad and sometimes deadly side effects when taking a drug. Tell your doctor or get medical help right away if you have any of the following signs or symptoms that may be related to a very bad side effect:  Signs of an allergic reaction, like rash; hives; itching; red, swollen, blistered, or peeling skin with or without fever; wheezing; tightness in the chest or throat; trouble breathing, swallowing, or talking; unusual hoarseness; or swelling of the mouth, face, lips, tongue, or throat.  Signs of kidney problems like unable to pass urine, change in how much urine is passed, blood in the urine, or a big weight gain.  Signs of high potassium levels like a heartbeat that does not feel normal; feeling confused; feeling weak, lightheaded, or dizzy; feeling like passing out; numbness or tingling; or shortness of breath.  Severe dizziness or passing out.  Cough that does not go away.  Severe stomach pain.  Severe upset stomach or throwing up.  Chest pain or pressure.  Liver problems have happened with drugs like this one. Sometimes, this has been deadly. Call your doctor right away if you have signs of liver problems like dark urine, tiredness, decreased appetite, upset stomach or stomach pain, light-colored stools, throwing up, or yellow skin or eyes.  What are some other side effects of this drug?   All drugs may cause side effects. However, many people have no side effects or only have minor  side effects. Call your doctor or get medical help if any of these side effects or any other side effects bother you or do not go away:  Dizziness or headache.  Cough.  These are not all of the side effects that may occur. If you have questions about side effects, call your doctor. Call your doctor for medical advice about side effects.  You may report side effects to your national health agency.  You may report side effects to the FDA at 1-520.667.1141. You may also report side effects at https://www.fda.gov/medwatch.  How is this drug best taken?   Use this drug as ordered by your doctor. Read all information given to you. Follow all instructions closely.  All products:   Take with or without food.  Take this drug at the same time of day.  Keep taking this drug as you have been told by your doctor or other health care provider, even if you feel well.  Drink lots of noncaffeine liquids unless told to drink less liquid by your doctor.  Tablets:   A liquid (suspension) can be made if you cannot swallow pills. Talk with your doctor or pharmacist.  If a liquid (suspension) is made, shake well before use.  All liquid products:   Measure liquid doses carefully. Use the measuring device that comes with this drug. If there is none, ask the pharmacist for a device to measure this drug.  What do I do if I miss a dose?   Take a missed dose as soon as you think about it.  If it is close to the time for your next dose, skip the missed dose and go back to your normal time.  Do not take 2 doses at the same time or extra doses.  How do I store and/or throw out this drug?   All products:   Store at room temperature in a dry place. Do not store in a bathroom.  Keep lid tightly closed.  Keep all drugs in a safe place. Keep all drugs out of the reach of children and pets.  Throw away unused or  drugs. Do not flush down a toilet or pour down a drain unless you are told to do so. Check with your pharmacist if you have questions  about the best way to throw out drugs. There may be drug take-back programs in your area.  Tablets:   If a liquid (suspension) is made from the tablets, throw away any part not used after 28 days.  Liquid (solution):   Do not freeze.  Protect from heat.  General drug facts   If your symptoms or health problems do not get better or if they become worse, call your doctor.  Do not share your drugs with others and do not take anyone else's drugs.  Some drugs may have another patient information leaflet. If you have any questions about this drug, please talk with your doctor, nurse, pharmacist, or other health care provider.  Some drugs may have another patient information leaflet. Check with your pharmacist. If you have any questions about this drug, please talk with your doctor, nurse, pharmacist, or other health care provider.  If you think there has been an overdose, call your poison control center or get medical care right away. Be ready to tell or show what was taken, how much, and when it happened.  Consumer Information Use and Disclaimer   This generalized information is a limited summary of diagnosis, treatment, and/or medication information. It is not meant to be comprehensive and should be used as a tool to help the user understand and/or assess potential diagnostic and treatment options. It does NOT include all information about conditions, treatments, medications, side effects, or risks that may apply to a specific patient. It is not intended to be medical advice or a substitute for the medical advice, diagnosis, or treatment of a health care provider based on the health care provider's examination and assessment of a patient's specific and unique circumstances. Patients must speak with a health care provider for complete information about their health, medical questions, and treatment options, including any risks or benefits regarding use of medications. This information does not endorse any treatments or  "medications as safe, effective, or approved for treating a specific patient. UpToDate, Inc. and its affiliates disclaim any warranty or liability relating to this information or the use thereof. The use of this information is governed by the Terms of Use, available at https://www.[employer].com/en/know/clinical-effectiveness-terms.  Last Reviewed Date   2023-09-08  Copyright   © 2024 UpToDate, Inc. and its affiliates and/or licensors. All rights reserved.  Patient Education     High blood pressure in adults   The Basics   Written by the doctors and editors at [employer]   What is high blood pressure? -- High blood pressure is a condition that puts you at risk for heart attack, stroke, and kidney disease. It does not usually cause symptoms. But it can be serious.  When your doctor or nurse tells you your blood pressure, they say 2 numbers. For instance, your doctor or nurse might say that your blood pressure is \"130 over 80.\" The top number is the pressure inside your arteries when your heart is jimmy. The bottom number is the pressure inside your arteries when your heart is relaxed.  \"Elevated blood pressure\" is a term doctors or nurses use as a warning. People with elevated blood pressure do not yet have high blood pressure. But their blood pressure is not as low as it should be for good health.  Many experts define high, elevated, and normal blood pressure as follows:   High - Top number of 130 or above and/or bottom number of 80 or above.   Elevated - Top number between 120 and 129 and bottom number of 79 or below.   Normal - Top number of 119 or below and bottom number of 79 or below.  This information is also in the table (table 1).  How can I lower my blood pressure? -- If your doctor or nurse prescribed blood pressure medicine, the most important thing you can do is to take it. If it causes side effects, do not just stop taking it. Instead, talk to your doctor or nurse about the problems it causes. " "They might be able to lower your dose or switch you to another medicine. If cost is a problem, mention that, too. They might be able to put you on a less expensive medicine. Taking your blood pressure medicine can keep you from having a heart attack or stroke, and it can save your life!  Can I do anything on my own? -- You have a lot of control over your blood pressure. To lower it:   Lose weight (if you are overweight).   Choose a diet low in fat and rich in fruits, vegetables, and low-fat dairy products.   Eat less salt.   Do something active for at least 30 minutes a day on most days of the week.   Drink less alcohol (if you drink more than 2 alcoholic drinks per day).  It's also a good idea to get a home blood pressure meter. People who check their own blood pressure at home do better at keeping it low and can sometimes even reduce the amount of medicine they take.  All topics are updated as new evidence becomes available and our peer review process is complete.  This topic retrieved from [employer] on: Feb 26, 2024.  Topic 17169 Version 23.0  Release: 32.2.4 - C32.56  © 2024 UpToDate, Inc. and/or its affiliates. All rights reserved.  table 1: Definition of normal and high blood pressure  Level  Top number  Bottom number    High 130 or above 80 or above   Elevated 120 to 129 79 or below   Normal 119 or below 79 or below   These definitions are from the American College of Cardiology/American Heart Association. Other expert groups might use slightly different definitions.  \"Elevated blood pressure\" is a term doctor or nurses use as a warning. It means you do not yet have high blood pressure, but your blood pressure is not as low as it should be for good health.  Graphic 02181 Version 6.0  Consumer Information Use and Disclaimer   Disclaimer: This generalized information is a limited summary of diagnosis, treatment, and/or medication information. It is not meant to be comprehensive and should be used as a tool to " "help the user understand and/or assess potential diagnostic and treatment options. It does NOT include all information about conditions, treatments, medications, side effects, or risks that may apply to a specific patient. It is not intended to be medical advice or a substitute for the medical advice, diagnosis, or treatment of a health care provider based on the health care provider's examination and assessment of a patient's specific and unique circumstances. Patients must speak with a health care provider for complete information about their health, medical questions, and treatment options, including any risks or benefits regarding use of medications. This information does not endorse any treatments or medications as safe, effective, or approved for treating a specific patient. UpToDate, Inc. and its affiliates disclaim any warranty or liability relating to this information or the use thereof.The use of this information is governed by the Terms of Use, available at https://www.[employer]/en/know/clinical-effectiveness-terms. 2024© UpToDate, Inc. and its affiliates and/or licensors. All rights reserved.  Copyright   © 2024 UpToDate, Inc. and/or its affiliates. All rights reserved.  Patient Education     Routine physical for adults   The Basics   Written by the doctors and editors at [email or web address]   What is a physical? -- A physical is a routine visit, or \"check-up,\" with your doctor. You might also hear it called a \"wellness visit\" or \"preventive visit.\"  During each visit, the doctor will:   Ask about your physical and mental health   Ask about your habits, behaviors, and lifestyle   Do an exam   Give you vaccines if needed   Talk to you about any medicines you take   Give advice about your health   Answer your questions  Getting regular check-ups is an important part of taking care of your health. It can help your doctor find and treat any problems you have. But it's also important for preventing health " "problems.  A routine physical is different from a \"sick visit.\" A sick visit is when you see a doctor because of a health concern or problem. Since physicals are scheduled ahead of time, you can think about what you want to ask the doctor.  How often should I get a physical? -- It depends on your age and health. In general, for people age 21 years and older:   If you are younger than 50 years, you might be able to get a physical every 3 years.   If you are 50 years or older, your doctor might recommend a physical every year.  If you have an ongoing health condition, like diabetes or high blood pressure, your doctor will probably want to see you more often.  What happens during a physical? -- In general, each visit will include:   Physical exam - The doctor or nurse will check your height, weight, heart rate, and blood pressure. They will also look at your eyes and ears. They will ask about how you are feeling and whether you have any symptoms that bother you.   Medicines - It's a good idea to bring a list of all the medicines you take to each doctor visit. Your doctor will talk to you about your medicines and answer any questions. Tell them if you are having any side effects that bother you. You should also tell them if you are having trouble paying for any of your medicines.   Habits and behaviors - This includes:   Your diet   Your exercise habits   Whether you smoke, drink alcohol, or use drugs   Whether you are sexually active   Whether you feel safe at home  Your doctor will talk to you about things you can do to improve your health and lower your risk of health problems. They will also offer help and support. For example, if you want to quit smoking, they can give you advice and might prescribe medicines. If you want to improve your diet or get more physical activity, they can help you with this, too.   Lab tests, if needed - The tests you get will depend on your age and situation. For example, your doctor " "might want to check your:   Cholesterol   Blood sugar   Iron level   Vaccines - The recommended vaccines will depend on your age, health, and what vaccines you already had. Vaccines are very important because they can prevent certain serious or deadly infections.   Discussion of screening - \"Screening\" means checking for diseases or other health problems before they cause symptoms. Your doctor can recommend screening based on your age, risk, and preferences. This might include tests to check for:   Cancer, such as breast, prostate, cervical, ovarian, colorectal, prostate, lung, or skin cancer   Sexually transmitted infections, such as chlamydia and gonorrhea   Mental health conditions like depression and anxiety  Your doctor will talk to you about the different types of screening tests. They can help you decide which screenings to have. They can also explain what the results might mean.   Answering questions - The physical is a good time to ask the doctor or nurse questions about your health. If needed, they can refer you to other doctors or specialists, too.  Adults older than 65 years often need other care, too. As you get older, your doctor will talk to you about:   How to prevent falling at home   Hearing or vision tests   Memory testing   How to take your medicines safely   Making sure that you have the help and support you need at home  All topics are updated as new evidence becomes available and our peer review process is complete.  This topic retrieved from [employer] on: May 02, 2024.  Topic 935091 Version 1.0  Release: 32.4.3 - C32.122  © 2024 UpToDate, Inc. and/or its affiliates. All rights reserved.  Consumer Information Use and Disclaimer   Disclaimer: This generalized information is a limited summary of diagnosis, treatment, and/or medication information. It is not meant to be comprehensive and should be used as a tool to help the user understand and/or assess potential diagnostic and treatment options. " It does NOT include all information about conditions, treatments, medications, side effects, or risks that may apply to a specific patient. It is not intended to be medical advice or a substitute for the medical advice, diagnosis, or treatment of a health care provider based on the health care provider's examination and assessment of a patient's specific and unique circumstances. Patients must speak with a health care provider for complete information about their health, medical questions, and treatment options, including any risks or benefits regarding use of medications. This information does not endorse any treatments or medications as safe, effective, or approved for treating a specific patient. UpToDate, Inc. and its affiliates disclaim any warranty or liability relating to this information or the use thereof.The use of this information is governed by the Terms of Use, available at https://www.[employer].com/en/know/clinical-effectiveness-terms. 2024© UpToDate, Inc. and its affiliates and/or licensors. All rights reserved.  Copyright   © 2024 UpToDate, Inc. and/or its affiliates. All rights reserved.

## 2025-06-23 ENCOUNTER — APPOINTMENT (OUTPATIENT)
Dept: LAB | Facility: CLINIC | Age: 35
End: 2025-06-23
Payer: MEDICARE

## 2025-06-23 DIAGNOSIS — Z00.00 ANNUAL PHYSICAL EXAM: ICD-10-CM

## 2025-06-23 DIAGNOSIS — Z13.6 SCREENING FOR CARDIOVASCULAR CONDITION: ICD-10-CM

## 2025-06-23 DIAGNOSIS — Z13.1 SCREENING FOR DIABETES MELLITUS: ICD-10-CM

## 2025-06-23 DIAGNOSIS — I10 PRIMARY HYPERTENSION: ICD-10-CM

## 2025-06-23 LAB
25(OH)D3 SERPL-MCNC: 14.9 NG/ML (ref 30–100)
ALBUMIN SERPL BCG-MCNC: 4.2 G/DL (ref 3.5–5)
ALP SERPL-CCNC: 62 U/L (ref 34–104)
ALT SERPL W P-5'-P-CCNC: 29 U/L (ref 7–52)
ANION GAP SERPL CALCULATED.3IONS-SCNC: 11 MMOL/L (ref 4–13)
AST SERPL W P-5'-P-CCNC: 20 U/L (ref 13–39)
BASOPHILS # BLD AUTO: 0.03 THOUSANDS/ÂΜL (ref 0–0.1)
BASOPHILS NFR BLD AUTO: 0 % (ref 0–1)
BILIRUB SERPL-MCNC: 0.44 MG/DL (ref 0.2–1)
BUN SERPL-MCNC: 11 MG/DL (ref 5–25)
CALCIUM SERPL-MCNC: 8.8 MG/DL (ref 8.4–10.2)
CHLORIDE SERPL-SCNC: 104 MMOL/L (ref 96–108)
CHOLEST SERPL-MCNC: 172 MG/DL (ref ?–200)
CO2 SERPL-SCNC: 24 MMOL/L (ref 21–32)
CREAT SERPL-MCNC: 0.66 MG/DL (ref 0.6–1.3)
CREAT UR-MCNC: 234.8 MG/DL
EOSINOPHIL # BLD AUTO: 0.15 THOUSAND/ÂΜL (ref 0–0.61)
EOSINOPHIL NFR BLD AUTO: 2 % (ref 0–6)
ERYTHROCYTE [DISTWIDTH] IN BLOOD BY AUTOMATED COUNT: 12.6 % (ref 11.6–15.1)
EST. AVERAGE GLUCOSE BLD GHB EST-MCNC: 88 MG/DL
GFR SERPL CREATININE-BSD FRML MDRD: 114 ML/MIN/1.73SQ M
GLUCOSE P FAST SERPL-MCNC: 95 MG/DL (ref 65–99)
HBA1C MFR BLD: 4.7 %
HCT VFR BLD AUTO: 38.2 % (ref 34.8–46.1)
HDLC SERPL-MCNC: 40 MG/DL
HGB BLD-MCNC: 12.5 G/DL (ref 11.5–15.4)
IMM GRANULOCYTES # BLD AUTO: 0.04 THOUSAND/UL (ref 0–0.2)
IMM GRANULOCYTES NFR BLD AUTO: 1 % (ref 0–2)
LDLC SERPL CALC-MCNC: 111 MG/DL (ref 0–100)
LYMPHOCYTES # BLD AUTO: 3.03 THOUSANDS/ÂΜL (ref 0.6–4.47)
LYMPHOCYTES NFR BLD AUTO: 35 % (ref 14–44)
MCH RBC QN AUTO: 31.3 PG (ref 26.8–34.3)
MCHC RBC AUTO-ENTMCNC: 32.7 G/DL (ref 31.4–37.4)
MCV RBC AUTO: 96 FL (ref 82–98)
MICROALBUMIN UR-MCNC: 15.2 MG/L
MICROALBUMIN/CREAT 24H UR: 6 MG/G CREATININE (ref 0–30)
MONOCYTES # BLD AUTO: 0.6 THOUSAND/ÂΜL (ref 0.17–1.22)
MONOCYTES NFR BLD AUTO: 7 % (ref 4–12)
NEUTROPHILS # BLD AUTO: 4.71 THOUSANDS/ÂΜL (ref 1.85–7.62)
NEUTS SEG NFR BLD AUTO: 55 % (ref 43–75)
NONHDLC SERPL-MCNC: 132 MG/DL
NRBC BLD AUTO-RTO: 0 /100 WBCS
PLATELET # BLD AUTO: 348 THOUSANDS/UL (ref 149–390)
PMV BLD AUTO: 10.8 FL (ref 8.9–12.7)
POTASSIUM SERPL-SCNC: 3.7 MMOL/L (ref 3.5–5.3)
PROT SERPL-MCNC: 7.4 G/DL (ref 6.4–8.4)
RBC # BLD AUTO: 3.99 MILLION/UL (ref 3.81–5.12)
SODIUM SERPL-SCNC: 139 MMOL/L (ref 135–147)
TRIGL SERPL-MCNC: 105 MG/DL (ref ?–150)
TSH SERPL DL<=0.05 MIU/L-ACNC: 1.76 UIU/ML (ref 0.45–4.5)
WBC # BLD AUTO: 8.56 THOUSAND/UL (ref 4.31–10.16)

## 2025-06-23 PROCEDURE — 82570 ASSAY OF URINE CREATININE: CPT

## 2025-06-23 PROCEDURE — 36415 COLL VENOUS BLD VENIPUNCTURE: CPT

## 2025-06-23 PROCEDURE — 82043 UR ALBUMIN QUANTITATIVE: CPT

## 2025-06-23 PROCEDURE — 82306 VITAMIN D 25 HYDROXY: CPT

## 2025-06-23 PROCEDURE — 83036 HEMOGLOBIN GLYCOSYLATED A1C: CPT

## 2025-06-23 PROCEDURE — 85025 COMPLETE CBC W/AUTO DIFF WBC: CPT

## 2025-06-23 PROCEDURE — 80053 COMPREHEN METABOLIC PANEL: CPT

## 2025-06-23 PROCEDURE — 80061 LIPID PANEL: CPT

## 2025-06-23 PROCEDURE — 84443 ASSAY THYROID STIM HORMONE: CPT

## 2025-06-26 ENCOUNTER — EVALUATION (OUTPATIENT)
Dept: PHYSICAL THERAPY | Facility: REHABILITATION | Age: 35
End: 2025-06-26
Payer: MEDICARE

## 2025-06-26 DIAGNOSIS — G89.29 CHRONIC MIDLINE LOW BACK PAIN WITHOUT SCIATICA: ICD-10-CM

## 2025-06-26 DIAGNOSIS — M54.50 CHRONIC MIDLINE LOW BACK PAIN WITHOUT SCIATICA: ICD-10-CM

## 2025-06-26 PROCEDURE — 97110 THERAPEUTIC EXERCISES: CPT

## 2025-06-26 PROCEDURE — 97112 NEUROMUSCULAR REEDUCATION: CPT

## 2025-06-26 PROCEDURE — 97161 PT EVAL LOW COMPLEX 20 MIN: CPT

## 2025-06-26 NOTE — PROGRESS NOTES
PT Evaluation     Today's date: 2025  Patient name: Ruth Self  : 1990  MRN: [id number]  Referring provider: Cathy Mixon CRNP  Dx:   Encounter Diagnosis     ICD-10-CM    1. Chronic midline low back pain without sciatica  M54.50 Ambulatory Referral to Physical Therapy    G89.29           Start Time: 0800  Stop Time: 0838  Total time in clinic (min): 38 minutes    Assessment  Impairments: abnormal coordination, abnormal gait, abnormal muscle firing, abnormal muscle tone, abnormal or restricted ROM, activity intolerance, impaired balance, impaired physical strength, lacks appropriate home exercise program, pain with function, weight-bearing intolerance, unable to perform ADL, participation limitations, activity limitations and endurance  Symptom irritability: moderate    Assessment details: Patient pleasantly completed the PT evaluation today very well. Patient presents to the clinic today with CC of LBP R>L. Findings of the evaluation include impaired lumbar ROM, decreased BLE strength, and LB pain with functional movements. Patient tolerated interventions fairly with cues to decrease intensity when pain levels increased. The patient was educated on the evaluation findings and the POC. HEP was provided and demonstrated. Patient responded well to exercises and interventions performed during the session. Patient noted no increase in symptoms. This patient is a great candidate for physical therapy to address the impairments, decrease pain, improve functional independence and quality of life.    Understanding of Dx/Px/POC: good     Prognosis: good    Goals  ST-6 weeks  Patient FOTO score will increase to 10 points more than initial to improve independence with ADLs  Patient reports feeling 25% better since starting therapy to improve QOL  Patient's TUG score will improve to 8 seconds to decrease fall risk  Patient's 5x STS score will improve to 12 seconds to decrease fall risk  Patient  reports a decrease in pain (< 5/10) when lifting her baby out of the crib to improve QOL.   Patient will report being compliant with HEP to improve prognosis.     LTG: 10-12 weeks  Patient FOTO score will increase to predicted value or more to improve independence with ADLs  Patient reports feeling 50% better since starting therapy to improve QOL  Patient will be able to perform ADLs and recreational activities with no more than a 3/10 pain to improve QOL.   Patient will report being compliant with an advanced HEP to improve prognosis.      Plan  Patient would benefit from: skilled physical therapy  Planned modality interventions: low level laser therapy, manual electrical stimulation, biofeedback, cryotherapy, hydrotherapy, ultrasound, traction and TENS    Planned therapy interventions: IASTM, joint mobilization, kinesiology taping, manual therapy, massage, Bridges taping, muscle pump exercises, nerve gliding, neuromuscular re-education, patient/caregiver education, postural training, strengthening, stretching, therapeutic activities, therapeutic exercise, therapeutic training, transfer training, functional ROM exercises, flexibility, coordination, body mechanics training, balance/weight bearing training, balance, ADL training, activity modification and abdominal trunk stabilization    Frequency: 2-3x week  Plan of Care beginning date: 6/26/2025  Plan of Care expiration date: 8/21/2025  Treatment plan discussed with: patient        Subjective Evaluation    History of Present Illness  Mechanism of injury: Patient presents to the eval today with cc of LBP. The pain began 3 weeks and has been worsening. BL back but R>L. The pain is isolated to the LB region and radiates into the buttock. Patient does have difficulty sleeping secondary to the pain.  Patient does not take OTC medications to offer short term relief. Finds most difficulty with sitting and standing long term. Finds some relief with walking and laying  down. Patient notes pain all day. Patient reports an increase in pain when changing her 11 month old's diaper and lifting out of the crib.   Quality of life: good    Patient Goals  Patient goals for therapy: decreased pain, improved balance and return to sport/leisure activities    Pain  Current pain ratin  At worst pain rating: 10  Quality: burning and tight  Aggravating factors: stair climbing, sitting and standing  Progression: worsening    Social Support  Lives with: spouse          Objective    Flowsheet Rows      Flowsheet Row Most Recent Value   PT/OT G-Codes    Current Score 44   Projected Score 68        Observation: anterior pelvic tilt while ambulating     Palpation: TTP LB midline     Lower Extremity Strength  MMT Right Left   Hip flexion  4 4   Hip Abduction P! 4  P! > R 4   Knee Flexion  4  4   Knee Extension 4 4         Dynamic Balance Tests      5x sit to stand (sec)  >14 sec indicates high risk for falls = 17 seconds, no UE   TUG (sec)  >14 sec indicates high risk for falls = 10 seconds     Slump Test  L: -  R: -    Lumbar  Flexion: mid shin and no pain   Extension: pain! Limited   Lateral Flexion L: mid thigh  Lateral Flexion R: mid thigh  R Rotation: pain! Limited   L Rotation: WFL       Precautions: hx of seizures   POC expires Unit limit Auth Expiration date PT/OT + Visit Limit?   25 Bomn   bomn         Visit/Unit Tracking  AUTH Status:  Date 2025        Yes  Used 1         Remaining             Pertinent Findings:      Test / Measure  2025   FOTO (Predicted 68) 44   LBP R>L                 Manuals                                                                 Neuro Re-Ed             HEP/POC education  SW            Pelvic tilt             Lifting techniques                                                                 Ther Ex             NS 5' lvl 6-7            Leg press  85# x10             SLR HEP            LTR HEP            Bridge HEP            STS              Cassi LEE hip abd.              Vinny              Retro ambulation              Ther Activity                                       Gait Training                                       Modalities

## 2025-07-01 ENCOUNTER — OFFICE VISIT (OUTPATIENT)
Dept: PHYSICAL THERAPY | Facility: REHABILITATION | Age: 35
End: 2025-07-01
Payer: MEDICARE

## 2025-07-01 DIAGNOSIS — M54.50 CHRONIC MIDLINE LOW BACK PAIN WITHOUT SCIATICA: Primary | ICD-10-CM

## 2025-07-01 DIAGNOSIS — G89.29 CHRONIC MIDLINE LOW BACK PAIN WITHOUT SCIATICA: Primary | ICD-10-CM

## 2025-07-01 PROCEDURE — 97110 THERAPEUTIC EXERCISES: CPT

## 2025-07-01 NOTE — PROGRESS NOTES
"Daily Note     Today's date: 2025  Patient name: Ruth Self  : 1990  MRN: [id number]  Referring provider: Cathy Mixon CRNP  Dx:   Encounter Diagnosis     ICD-10-CM    1. Chronic midline low back pain without sciatica  M54.50     G89.29           Start Time: 1043  Stop Time: 1125  Total time in clinic (min): 42 minutes    Subjective: Patient states that her back felt better after LV.       Objective: See treatment diary below      Assessment: Tolerated treatment well. Patient exhibited good technique with therapeutic exercises and would benefit from continued PT. Patient noted no sx during session. Patient educated on expected soreness. Patient seen 1:1 with PTs for entirety of session.         Plan: Continue per plan of care.      Precautions: hx of seizures   POC expires Unit limit Auth Expiration date PT/OT + Visit Limit?   25 Bomn   bomn         Visit/Unit Tracking  AUTH Status:  Date 2025       Yes 8v  Used 1 2        Remaining             Pertinent Findings:      Test / Measure  2025   FOTO (Predicted 68) 44   LBP R>L                 Manuals                                                                Neuro Re-Ed             HEP/POC education  SW            Pelvic tilt                          Lifting techniques                                                                 Ther Ex             NS 5' lvl 6-7 6' lvl 6            Leg press  85# x10  85# 2x10            SLR HEP            LTR HEP            Bridge HEP Hams bridge 2x10            Pball press down   3\" x15            STS  15# 1 foam 2x10            Clamshells   2x10 BTB            SL hip abd.   2x10 each            LPD  MTB 2x10            LSD   6\" x10 each            Paloff   8# x15 each            Step ups   6\" 20# KB with bl band   X10 each            Retro ambulation              Ther Activity                                       Gait Training                                     "   Modalities

## 2025-07-03 ENCOUNTER — OFFICE VISIT (OUTPATIENT)
Dept: PHYSICAL THERAPY | Facility: REHABILITATION | Age: 35
End: 2025-07-03
Payer: MEDICARE

## 2025-07-03 DIAGNOSIS — G89.29 CHRONIC MIDLINE LOW BACK PAIN WITHOUT SCIATICA: Primary | ICD-10-CM

## 2025-07-03 DIAGNOSIS — M54.50 CHRONIC MIDLINE LOW BACK PAIN WITHOUT SCIATICA: Primary | ICD-10-CM

## 2025-07-03 PROCEDURE — 97110 THERAPEUTIC EXERCISES: CPT | Performed by: PHYSICAL THERAPY ASSISTANT

## 2025-07-03 NOTE — PROGRESS NOTES
"Daily Note     Today's date: 7/3/2025  Patient name: Ruth Self  : 1990  MRN: [id number]  Referring provider: Cathy Mixon CRNP  Dx:   Encounter Diagnosis     ICD-10-CM    1. Chronic midline low back pain without sciatica  M54.50     G89.29                      Subjective: Patient reports no new complaints. Rates LBP 5/10 at the start of therapy today.     Objective: See treatment diary below      Assessment: Tolerated treatment well. Patient exhibited good technique with therapeutic exercises and would benefit from continued PT. No reports of increased pain with activity and pt states pain is decreased post exercise. Pt would benefit from continued therapy to address goals for return to PLOF.         Plan: Continue per plan of care.      Precautions: hx of seizures   POC expires Unit limit Auth Expiration date PT/OT + Visit Limit?   25 Bomn   bomn         Visit/Unit Tracking  AUTH Status:  Date 2025 7/1 7/3      Yes 8v  Used 1 2 3       Remaining             Pertinent Findings:      Test / Measure  2025   FOTO (Predicted 68) 44   LBP R>L                 Manuals 6/26 7/1 7/3                                                              Neuro Re-Ed             HEP/POC education  SW            Pelvic tilt                          Lifting techniques                                                                 Ther Ex             NS 5' lvl 6-7 6' lvl 6  6\" lvl 6          Leg press  85# x10  85# 2x10  85# 2x10          SLR HEP            LTR HEP            Bridge HEP Hams bridge 2x10  2x10          Pball press down   3\" x15  3\" 2x10          STS  15# 1 foam 2x10  15# 1 foam 2x10          Clamshells   2x10 BTB  2x10 BTB          SL hip abd.   2x10 each  2x10 ea          LPD  MTB 2x10  MTB 2x10          LSD   6\" x10 each  6\" x10 ea          Paloff   8# x15 each  8# x15 ea          Step ups   6\" 20# KB with bl band   X10 each  6\" 20# KB x10 ea          Retro ambulation            "   Ther Activity                                       Gait Training                                       Modalities

## 2025-07-08 ENCOUNTER — OFFICE VISIT (OUTPATIENT)
Dept: PHYSICAL THERAPY | Facility: REHABILITATION | Age: 35
End: 2025-07-08
Payer: MEDICARE

## 2025-07-08 DIAGNOSIS — G89.29 CHRONIC MIDLINE LOW BACK PAIN WITHOUT SCIATICA: Primary | ICD-10-CM

## 2025-07-08 DIAGNOSIS — M54.50 CHRONIC MIDLINE LOW BACK PAIN WITHOUT SCIATICA: Primary | ICD-10-CM

## 2025-07-08 PROCEDURE — 97110 THERAPEUTIC EXERCISES: CPT

## 2025-07-08 NOTE — PROGRESS NOTES
"Daily Note     Today's date: 2025  Patient name: Ruth Self  : 1990  MRN: [id number]  Referring provider: Cathy Mixon CRNP  Dx:   Encounter Diagnosis     ICD-10-CM    1. Chronic midline low back pain without sciatica  M54.50     G89.29         Subjective: Rates LBP 3/10 at the start of therapy today.     Objective: See treatment diary below      Assessment: Tolerated treatment well. Patient exhibited good technique with therapeutic exercises and would benefit from continued PT. Good effort and tolerance to treatment session. Able to progress to include additional core stability and strengthening. Reports reduction in pain to 2/10. Pt would benefit from continued therapy to address goals for return to PLOF.         Plan: Continue per plan of care.      Precautions: hx of seizures   POC expires Unit limit Auth Expiration date PT/OT + Visit Limit?   25 Bomn   bomn         Visit/Unit Tracking  AUTH Status:  Date 2025 7/1 7/3 7/8     Yes 8v  Used 1 2 3 4      Remaining             Pertinent Findings:      Test / Measure  2025   FOTO (Predicted 68) 44   LBP R>L                 Manuals 6/26 7/1 7/3 7/8                                                             Neuro Re-Ed             HEP/POC education  SW            Pelvic tilt                          Lifting techniques                                                                 Ther Ex             NS 5' lvl 6-7 6' lvl 6  6\" lvl 6 6\" lvl 6         Leg press  85# x10  85# 2x10  85# 2x10          SLR HEP            LTR HEP            Bridge HEP Hams bridge 2x10  2x10 2x10         Pball press down /march    2x10         Pball press down   3\" x15  3\" 2x10 3\" 2x10         STS  15# 1 foam 2x10  15# 1 foam 2x10 15# 1 foam 2x10         Clamshells   2x10 BTB  2x10 BTB 2x10 BTB         SL hip abd.   2x10 each  2x10 ea 2x10 ea         LPD  MTB 2x10  MTB 2x10 8#  2x10         LSD   6\" x10 each  6\" x10 ea 6\" 2x10 ea         Paloff   8# " "x15 each  8# x15 ea 8# 2x10         Step ups   6\" 20# KB with bl band   X10 each  6\" 20# KB x10 ea 6'' 2x10         Retro ambulation              Ther Activity                                       Gait Training                                       Modalities                                            "

## 2025-07-10 ENCOUNTER — OFFICE VISIT (OUTPATIENT)
Dept: PHYSICAL THERAPY | Facility: REHABILITATION | Age: 35
End: 2025-07-10
Payer: MEDICARE

## 2025-07-10 DIAGNOSIS — G89.29 CHRONIC MIDLINE LOW BACK PAIN WITHOUT SCIATICA: Primary | ICD-10-CM

## 2025-07-10 DIAGNOSIS — M54.50 CHRONIC MIDLINE LOW BACK PAIN WITHOUT SCIATICA: Primary | ICD-10-CM

## 2025-07-10 PROCEDURE — 97110 THERAPEUTIC EXERCISES: CPT

## 2025-07-10 NOTE — PROGRESS NOTES
"Daily Note     Today's date: 7/10/2025  Patient name: Ruth Self  : 1990  MRN: [id number]  Referring provider: Cathy Mixon CRNP  Dx:   Encounter Diagnosis     ICD-10-CM    1. Chronic midline low back pain without sciatica  M54.50     G89.29           Start Time: 1015  Stop Time: 1058  Total time in clinic (min): 43 minutes    Subjective: Patient states her back is doing better since therapy.       Objective: See treatment diary below      Assessment: Tolerated treatment well. Patient exhibited good technique with therapeutic exercises. Continues to benefit from lumbo pelvic strengthening. Updated HEP provided.       Plan: DC. Patient encouraged to return or call with any questions or concerns.      Precautions: hx of seizures   POC expires Unit limit Auth Expiration date PT/OT + Visit Limit?   25 Bomn   bomn         Visit/Unit Tracking  AUTH Status:  Date 2025 7/1 7/3 7/8 7/10     Yes 8v  Used 1 2 3 4 5     Remaining             Pertinent Findings:      Test / Measure  2025 7/10    FOTO (Predicted 68) 44 67    LBP R>L                Access Code: [email or web address]  URL: [email or web address]  Date: 07/10/2025  Prepared by: Luba Nowak    Exercises  - Supine Active Straight Leg Raise  - 1 x daily - 7 x weekly - 3 sets - 10 reps  - Supine Lower Trunk Rotation  - 1 x daily - 7 x weekly - 3 sets - 10 reps  - Supine Bridge  - 1 x daily - 7 x weekly - 3 sets - 10 reps  - Hooklying Clamshell with Resistance  - 1 x daily - 7 x weekly - 3 sets - 10 reps  - Squat with Chair Touch and Resistance Loop  - 1 x daily - 7 x weekly - 3 sets - 10 reps    Manuals 6/26 7/1 7/3 7/8 7/10                                                             Neuro Re-Ed             HEP/POC education  SW            Pelvic tilt                          Lifting techniques                                                                 Ther Ex             NS 5' lvl 6-7 6' lvl 6  6\" lvl 6 6\" lvl 6 6' lvl 6  " "       Leg press  85# x10  85# 2x10  85# 2x10  2x10 100#         SLR HEP            LTR HEP            Bridge HEP Hams bridge 2x10  2x10 2x10 Hams 2x10         Pball press down w/march    2x10         Pball press down   3\" x15  3\" 2x10 3\" 2x10 3\" 2x10        STS  15# 1 foam 2x10  15# 1 foam 2x10 15# 1 foam 2x10 20# 1 foam 2x10        Clamshells   2x10 BTB  2x10 BTB 2x10 BTB 3x10 BTB         SL hip abd.   2x10 each  2x10 ea 2x10 ea 2# 2x10        LPD  MTB 2x10  MTB 2x10 8#  2x10 11# 2x10         LSD   6\" x10 each  6\" x10 ea 6\" 2x10 ea 6\" 2x10 ea        Paloff   8# x15 each  8# x15 ea 8# 2x10 9# 2x10 each         Step ups   6\" 20# KB with bl band   X10 each  6\" 20# KB x10 ea 6'' 2x10 6\" 15# KB x10 ea        Retro ambulation      20# 3 steps x10         Ther Activity                                       Gait Training                                       Modalities                                              "

## 2025-07-18 ENCOUNTER — OFFICE VISIT (OUTPATIENT)
Dept: FAMILY MEDICINE CLINIC | Facility: CLINIC | Age: 35
End: 2025-07-18
Payer: MEDICARE

## 2025-07-18 VITALS
HEART RATE: 99 BPM | OXYGEN SATURATION: 98 % | WEIGHT: 277 LBS | HEIGHT: 66 IN | DIASTOLIC BLOOD PRESSURE: 90 MMHG | TEMPERATURE: 98 F | SYSTOLIC BLOOD PRESSURE: 122 MMHG | BODY MASS INDEX: 44.52 KG/M2

## 2025-07-18 DIAGNOSIS — Z78.9 LANGUAGE BARRIER TO COMMUNICATION: ICD-10-CM

## 2025-07-18 DIAGNOSIS — I10 PRIMARY HYPERTENSION: Primary | ICD-10-CM

## 2025-07-18 PROCEDURE — 99214 OFFICE O/P EST MOD 30 MIN: CPT

## 2025-07-18 NOTE — PROGRESS NOTES
Name: Ruth Self      : 1990      MRN: [id number]  Encounter Provider: PILY Martinez  Encounter Date: 2025   Encounter department: [facility]  :  Assessment & Plan  Primary hypertension  Today's /90  goal bp 130/80, pt's bp is not at goal  Current medication: Lisinopril 5mg   At last visit 25 patient was started on lisinopril due to a new diagnosis of hypertension.   Advised patient on symptoms of hypotension & severe HTN  Limit salt-intake & caffeine. DASH diet discussed, minimize stress level  Weight reduction efforts via improved diet & increased exercise         Language barrier to communication  Lithuanian speaking    required    Office visit   [place] [id number]              History of Present Illness   Hypertension  This is a recurrent problem. The current episode started 1 to 4 weeks ago. The problem has been gradually improving since onset. The problem is controlled. Pertinent negatives include no anxiety, blurred vision, chest pain, headaches, malaise/fatigue, neck pain, orthopnea, palpitations, peripheral edema, PND, shortness of breath or sweats. There are no associated agents to hypertension. Risk factors for coronary artery disease include obesity and sedentary lifestyle. Past treatments include ACE inhibitors. The current treatment provides significant improvement. There are no compliance problems.  There is no history of angina, kidney disease, CAD/MI, CVA, heart failure, left ventricular hypertrophy, PVD or retinopathy.     Review of Systems   Constitutional:  Negative for activity change, chills, fatigue, fever and malaise/fatigue.   HENT:  Negative for congestion, ear pain, rhinorrhea, sore throat and trouble swallowing.    Eyes:  Negative for blurred vision, pain and visual disturbance.   Respiratory:  Negative for cough, chest tightness and shortness of breath.    Cardiovascular:  Negative for chest pain,  "palpitations, orthopnea, leg swelling and PND.   Gastrointestinal:  Negative for abdominal pain, constipation, diarrhea, nausea and vomiting.   Genitourinary:  Negative for difficulty urinating, dysuria, hematuria and urgency.   Musculoskeletal:  Negative for arthralgias, back pain and neck pain.   Skin:  Negative for color change and rash.   Neurological:  Negative for dizziness, seizures, syncope and headaches.   Psychiatric/Behavioral:  Negative for dysphoric mood. The patient is not nervous/anxious.    All other systems reviewed and are negative.      Objective   /90 (BP Location: Left arm, Patient Position: Sitting, Cuff Size: Adult)   Pulse 99   Temp 98 °F (36.7 °C) (Temporal)   Ht 5' 6\" (1.676 m)   Wt 126 kg (277 lb)   SpO2 98%   BMI 44.71 kg/m²      Physical Exam  Vitals and nursing note reviewed.   Constitutional:       General: She is not in acute distress.     Appearance: Normal appearance. She is well-developed and normal weight.   HENT:      Head: Normocephalic and atraumatic.      Right Ear: Tympanic membrane, ear canal and external ear normal. There is no impacted cerumen.      Left Ear: Tympanic membrane, ear canal and external ear normal. There is no impacted cerumen.      Nose: Nose normal.      Mouth/Throat:      Mouth: Mucous membranes are moist.      Pharynx: Oropharynx is clear.     Eyes:      Extraocular Movements: Extraocular movements intact.      Conjunctiva/sclera: Conjunctivae normal.      Pupils: Pupils are equal, round, and reactive to light.       Cardiovascular:      Rate and Rhythm: Normal rate and regular rhythm.      Pulses: Normal pulses.      Heart sounds: Normal heart sounds. No murmur heard.  Pulmonary:      Effort: Pulmonary effort is normal. No respiratory distress.      Breath sounds: Normal breath sounds.   Abdominal:      General: Bowel sounds are normal.      Palpations: Abdomen is soft.      Tenderness: There is no abdominal tenderness.     Musculoskeletal:   "       General: Normal range of motion.      Cervical back: Normal range of motion and neck supple.      Right lower leg: No edema.      Left lower leg: No edema.     Skin:     General: Skin is warm and dry.      Capillary Refill: Capillary refill takes less than 2 seconds.     Neurological:      General: No focal deficit present.      Mental Status: She is alert and oriented to person, place, and time. Mental status is at baseline.     Psychiatric:         Mood and Affect: Mood normal.         Behavior: Behavior normal.         Thought Content: Thought content normal.         Judgment: Judgment normal.       Administrative Statements   I have spent a total time of 20 minutes in caring for this patient on the day of the visit/encounter including Diagnostic results, Prognosis, Risks and benefits of tx options, Instructions for management, Patient and family education, Importance of tx compliance, Risk factor reductions, Impressions, Counseling / Coordination of care, Documenting in the medical record, Reviewing/placing orders in the medical record (including tests, medications, and/or procedures), and Obtaining or reviewing history  .

## 2025-07-18 NOTE — ASSESSMENT & PLAN NOTE
Today's /90  goal bp 130/80, pt's bp is not at goal  Current medication: Lisinopril 5mg   At last visit 06/20/25 patient was started on lisinopril due to a new diagnosis of hypertension.   Advised patient on symptoms of hypotension & severe HTN  Limit salt-intake & caffeine. DASH diet discussed, minimize stress level  Weight reduction efforts via improved diet & increased exercise

## (undated) DEVICE — Device

## (undated) DEVICE — SKIN MARKER DUAL TIP WITH RULER CAP, FLEXIBLE RULER AND LABELS: Brand: DEVON

## (undated) DEVICE — TELFA NON-ADHERENT ABSORBENT DRESSING: Brand: TELFA

## (undated) DEVICE — SUT VICRYL 0 CTX 36 IN J978H

## (undated) DEVICE — GLOVE PI ULTRA TOUCH SZ 6

## (undated) DEVICE — SUT VICRYL 3-0 CT-1 36 IN J944H

## (undated) DEVICE — GLOVE INDICATOR UNDERGLOVE SZ 6 BLUE

## (undated) DEVICE — ABDOMINAL PAD: Brand: DERMACEA

## (undated) DEVICE — SUT MONOCRYL 4-0 SH 27 IN Y315H

## (undated) DEVICE — KIT,BETHLEHEM C SECT DELIVERY: Brand: CARDINAL HEALTH

## (undated) DEVICE — SUT VICRYL 0 CT-1 27 IN J260H